# Patient Record
Sex: FEMALE | Race: WHITE | Employment: OTHER | URBAN - METROPOLITAN AREA
[De-identification: names, ages, dates, MRNs, and addresses within clinical notes are randomized per-mention and may not be internally consistent; named-entity substitution may affect disease eponyms.]

---

## 2018-05-11 ENCOUNTER — OFFICE VISIT (OUTPATIENT)
Dept: INTERNAL MEDICINE CLINIC | Age: 58
End: 2018-05-11

## 2018-05-11 VITALS
OXYGEN SATURATION: 95 % | BODY MASS INDEX: 36.32 KG/M2 | WEIGHT: 218 LBS | RESPIRATION RATE: 18 BRPM | DIASTOLIC BLOOD PRESSURE: 84 MMHG | HEART RATE: 81 BPM | HEIGHT: 65 IN | SYSTOLIC BLOOD PRESSURE: 128 MMHG

## 2018-05-11 DIAGNOSIS — M72.2 PLANTAR FASCIITIS, BILATERAL: ICD-10-CM

## 2018-05-11 DIAGNOSIS — M79.7 FIBROMYALGIA: Primary | ICD-10-CM

## 2018-05-11 DIAGNOSIS — G93.32 CFS (CHRONIC FATIGUE SYNDROME): ICD-10-CM

## 2018-05-11 DIAGNOSIS — G47.33 OSA ON CPAP: ICD-10-CM

## 2018-05-11 DIAGNOSIS — F33.2 SEVERE EPISODE OF RECURRENT MAJOR DEPRESSIVE DISORDER, WITHOUT PSYCHOTIC FEATURES (HCC): ICD-10-CM

## 2018-05-11 DIAGNOSIS — Z99.89 OSA ON CPAP: ICD-10-CM

## 2018-05-11 DIAGNOSIS — E03.9 ACQUIRED HYPOTHYROIDISM: ICD-10-CM

## 2018-05-11 DIAGNOSIS — E78.00 HYPERCHOLESTEROLEMIA: ICD-10-CM

## 2018-05-11 DIAGNOSIS — J45.20 MILD INTERMITTENT ASTHMA, UNSPECIFIED WHETHER COMPLICATED: ICD-10-CM

## 2018-05-11 DIAGNOSIS — L71.9 ROSACEA: ICD-10-CM

## 2018-05-11 PROBLEM — E66.01 SEVERE OBESITY (BMI 35.0-39.9) WITH COMORBIDITY (HCC): Status: ACTIVE | Noted: 2018-05-11

## 2018-05-11 PROBLEM — F32.A DEPRESSION: Status: ACTIVE | Noted: 2018-05-11

## 2018-05-11 RX ORDER — FLUTICASONE FUROATE AND VILANTEROL 100; 25 UG/1; UG/1
1 POWDER RESPIRATORY (INHALATION) DAILY
COMMUNITY
End: 2021-12-13 | Stop reason: SDUPTHER

## 2018-05-11 RX ORDER — ATORVASTATIN CALCIUM 10 MG/1
TABLET, FILM COATED ORAL DAILY
COMMUNITY
End: 2018-05-11 | Stop reason: SDUPTHER

## 2018-05-11 RX ORDER — MODAFINIL 200 MG/1
200 TABLET ORAL 2 TIMES DAILY
Qty: 60 TAB | Refills: 2 | Status: SHIPPED | OUTPATIENT
Start: 2018-05-11 | End: 2020-08-06 | Stop reason: SDUPTHER

## 2018-05-11 RX ORDER — LOSARTAN POTASSIUM 100 MG/1
100 TABLET ORAL DAILY
Qty: 90 TAB | Refills: 1 | Status: SHIPPED | OUTPATIENT
Start: 2018-05-11 | End: 2018-12-07 | Stop reason: SDUPTHER

## 2018-05-11 RX ORDER — LEVOTHYROXINE SODIUM 75 UG/1
TABLET ORAL
COMMUNITY
End: 2018-06-26 | Stop reason: SDUPTHER

## 2018-05-11 RX ORDER — ATORVASTATIN CALCIUM 10 MG/1
10 TABLET, FILM COATED ORAL DAILY
Qty: 90 TAB | Refills: 1 | Status: SHIPPED | OUTPATIENT
Start: 2018-05-11 | End: 2018-06-26 | Stop reason: SDUPTHER

## 2018-05-11 RX ORDER — MODAFINIL 200 MG/1
200 TABLET ORAL DAILY
COMMUNITY
End: 2018-05-11 | Stop reason: SDUPTHER

## 2018-05-11 RX ORDER — DULOXETIN HYDROCHLORIDE 60 MG/1
60 CAPSULE, DELAYED RELEASE ORAL 2 TIMES DAILY
COMMUNITY
End: 2021-04-07

## 2018-05-11 RX ORDER — TRAZODONE HYDROCHLORIDE 50 MG/1
200 TABLET ORAL
COMMUNITY
End: 2018-06-26 | Stop reason: SDUPTHER

## 2018-05-11 RX ORDER — PREGABALIN 100 MG/1
CAPSULE ORAL 2 TIMES DAILY
COMMUNITY
End: 2018-05-11 | Stop reason: SDUPTHER

## 2018-05-11 RX ORDER — PREGABALIN 100 MG/1
100 CAPSULE ORAL 2 TIMES DAILY
Qty: 60 CAP | Refills: 2 | Status: SHIPPED | OUTPATIENT
Start: 2018-05-11 | End: 2020-12-24

## 2018-05-11 RX ORDER — MONTELUKAST SODIUM 10 MG/1
10 TABLET ORAL DAILY
COMMUNITY
End: 2021-01-27 | Stop reason: SDUPTHER

## 2018-05-11 RX ORDER — LOSARTAN POTASSIUM 100 MG/1
100 TABLET ORAL DAILY
COMMUNITY
End: 2018-05-11 | Stop reason: SDUPTHER

## 2018-05-11 NOTE — PROGRESS NOTES
Health Maintenance Due   Topic Date Due    DTaP/Tdap/Td series (1 - Tdap) 11/01/1983    PAP AKA CERVICAL CYTOLOGY  11/01/1983    BREAST CANCER SCRN MAMMOGRAM  11/01/2012    FOBT Q 1 YEAR AGE 50-75  11/01/2012       Chief Complaint   Patient presents with    New Patient    Hypertension    Fibromyalgia       1. Have you been to the ER, urgent care clinic since your last visit? Hospitalized since your last visit? No    2. Have you seen or consulted any other health care providers outside of the [address] [place] [address] since your last visit? Include any pap smears or colon screening. No    3) Do you have an Advance Directive on file? no    4) Are you interested in receiving information on Advance Directives? NO      Patient is accompanied by self I have received verbal consent from Lorie Curtis to discuss any/all medical information while they are present in the room.

## 2018-05-11 NOTE — MR AVS SNAPSHOT
[address] [facility] 102 [address] [place] [address] 
[phone] Patient: Lorie Curtis MRN: [id number] HVN:[id number] Visit Information Date & Time Provider Department Dept. Phone Encounter #  
 5/11/2018  2:00 PM Clovis Espinoza, [address] [place] [address] Internal Medicine [phone] [id number] Follow-up Instructions Return in about 3 months (around 8/11/2018). Upcoming Health Maintenance Date Due DTaP/Tdap/Td series (1 - Tdap) 11/1/1983 PAP AKA CERVICAL CYTOLOGY 11/1/1983 BREAST CANCER SCRN MAMMOGRAM 11/1/2012 FOBT Q 1 YEAR AGE 50-75 11/1/2012 Influenza Age 5 to Adult 8/1/2018 Allergies as of 5/11/2018  Review Complete On: 5/11/2018 By: Clovis Espinoza DO Severity Noted Reaction Type Reactions Penicillins  05/11/2018    Nausea and Vomiting Current Immunizations  Never Reviewed No immunizations on file. Not reviewed this visit You Were Diagnosed With   
  
 Codes Comments Fibromyalgia    -  Primary ICD-10-CM: M79.7 ICD-9-CM: 729.1   
 CFS (chronic fatigue syndrome)     ICD-10-CM: R53.82 
ICD-9-CM: 780.71 Severe episode of recurrent major depressive disorder, without psychotic features ([facility] [address]     ICD-10-CM: F33.2 ICD-9-CM: 296.33 Acquired hypothyroidism     ICD-10-CM: E03.9 ICD-9-CM: 244.9 CLEO on CPAP     ICD-10-CM: G47.33, Z99.89 ICD-9-CM: 327.23, V46.8 Hypercholesterolemia     ICD-10-CM: E78.00 ICD-9-CM: 272.0 Mild intermittent asthma, unspecified whether complicated     IZO-65-ZI: J45.20 ICD-9-CM: 493.90 Rosacea     ICD-10-CM: L71.9 ICD-9-CM: 695.3 Plantar fasciitis, bilateral     ICD-10-CM: M72.2 ICD-9-CM: 728.71 Vitals BP Pulse Resp Height(growth percentile) Weight(growth percentile) SpO2  
 128/84 (BP 1 Location: Left arm, BP Patient Position: Sitting) 81 18 5' 5\" (1.651 m) 218 lb (98.9 kg) 95% BMI Smoking Status 36.28 kg/m2 Never Smoker Vitals History BMI and BSA Data Body Mass Index Body Surface Area  
 36.28 kg/m 2 2.13 m 2 Preferred Pharmacy Pharmacy Name Phone [id number] Marlon Khan [address] [place] [address] [phone] Your Updated Medication List  
  
   
This list is accurate as of 5/11/18  3:06 PM.  Always use your most recent med list.  
  
  
  
  
 atorvastatin 10 mg tablet Commonly known as:  LIPITOR Take 1 Tab by mouth daily. B COMPLEX 1 PO Take  by mouth. BREO ELLIPTA 100-25 mcg/dose inhaler Generic drug:  fluticasone-vilanterol Take 1 Puff by inhalation daily. CRANBERRY-PROBIOTICS-VITAMIN C 498-70-42 mg-mg-million Tab Generic drug:  AZO CRANBERRY Take  by mouth. DULoxetine 60 mg capsule Commonly known as:  CYMBALTA Take 60 mg by mouth two (2) times a day. levothyroxine 75 mcg tablet Commonly known as:  SYNTHROID Take  by mouth Daily (before breakfast). losartan 100 mg tablet Commonly known as:  COZAAR Take 1 Tab by mouth daily. modafinil 200 mg tablet Commonly known as:  PROVIGIL Take 1 Tab by mouth two (2) times a day. Max Daily Amount: 400 mg.  
  
 montelukast 10 mg tablet Commonly known as:  SINGULAIR Take 10 mg by mouth daily. pregabalin 100 mg capsule Commonly known as:  [place] Take 1 Cap by mouth two (2) times a day. Max Daily Amount: 200 mg.  
  
 traZODone 50 mg tablet Commonly known as:  Deborah [place] Take 200 mg by mouth nightly. Prescriptions Printed Refills  
 pregabalin (LYRICA) 100 mg capsule 2 Sig: Take 1 Cap by mouth two (2) times a day. Max Daily Amount: 200 mg. Class: Print Route: Oral  
 modafinil (PROVIGIL) 200 mg tablet 2 Sig: Take 1 Tab by mouth two (2) times a day. Max Daily Amount: 400 mg. Class: Print Route: Oral  
  
Prescriptions Sent to Pharmacy  Refills  
 atorvastatin (LIPITOR) 10 mg tablet 1  
 Sig: Take 1 Tab by mouth daily. Class: Normal  
 Pharmacy: [facility] [id number], [address] Ph #: [phone] Route: Oral  
 losartan (COZAAR) 100 mg tablet 1 Sig: Take 1 Tab by mouth daily. Class: Normal  
 Pharmacy: [facility] # [id number], [address] Ph #: [phone] Route: Oral  
  
We Performed the Following CBC W/O DIFF [06002 CPT(R)] LIPID PANEL [56703 CPT(R)] METABOLIC PANEL, COMPREHENSIVE [84824 CPT(R)] REFERRAL TO PSYCHIATRY [REF91 Custom] REFERRAL TO PULMONARY DISEASE [LLW59 Custom] TSH 3RD GENERATION [40526 CPT(R)] Follow-up Instructions Return in about 3 months (around 8/11/2018). Referral Information Referral ID Referred By Referred To  
  
 [id number] Sheryl Williamson MD   
   [address] [facility] [address] [facility], [address] Phone: [phone] Fax: [phone] Visits Status Start Date End Date 1 New Request 5/11/18 5/11/19 If your referral has a status of pending review or denied, additional information will be sent to support the outcome of this decision. Referral ID Referred By Referred To  
 [id number] [facility] [address] [place], [address] Visits Status Start Date End Date 1 New Request 5/11/18 5/11/19 If your referral has a status of pending review or denied, additional information will be sent to support the outcome of this decision. Introducing [facility]! Liam Aldrich introduces [employer] patient portal. Now you can access parts of your medical record, email your doctor's office, and request medication refills online. 1. In your internet browser, go to [email or web address] 2. Click on the First Time User? Click Here link in the Sign In box. You will see the New Member Sign Up page. 3. Enter your [employer] Access Code exactly as it appears below. You will not need to use this code after youve completed the sign-up process. If you do not sign up before the expiration date, you must request a new code. · [employer] Access Code: [email or web address] Expires: 8/9/2018  3:05 PM 
 
4. Enter the last four digits of your Social Security Number (xxxx) and Date of Birth (mm/dd/yyyy) as indicated and click Submit. You will be taken to the next sign-up page. 5. Create a [employer]Live ID. This will be your [employer] login ID and cannot be changed, so think of one that is secure and easy to remember. 6. Create a [employer] password. You can change your password at any time. 7. Enter your Password Reset Question and Answer. This can be used at a later time if you forget your password. 8. Enter your e-mail address. You will receive e-mail notification when new information is available in [address]. 9. Click Sign Up. You can now view and download portions of your medical record. 10. Click the Download Summary menu link to download a portable copy of your medical information. If you have questions, please visit the Frequently Asked Questions section of the [employer] website. Remember, [employer] is NOT to be used for urgent needs. For medical emergencies, dial 911. Now available from your iPhone and Android! Please provide this summary of care documentation to your next provider. If you have any questions after today's visit, please call [phone].

## 2018-05-30 NOTE — PROGRESS NOTES
HISTORY OF PRESENT ILLNESS  Veldon Bamberger is a 56 y.o. female. New patient who comes in to establish care. A few chronic medical issues including fibromyalgia and chronic fatigue syndrome. Reports being depressed. She is very obese and not losing weight. Trying to watch her diet and be active physically as tolerated. Reports bilateral plantar fasciitis. Has asthma and using inhalers. On CPAP for CLEO. Med list reviewed. Needs labs. Needs referral to specialists. Denies smoking. Drinks alcohol socially. No other acute issues today. New Patient   Pertinent negatives include no chest pain, no abdominal pain, no headaches and no shortness of breath. Hypertension    Associated symptoms include malaise/fatigue. Pertinent negatives include no chest pain, no blurred vision, no headaches, no dizziness and no shortness of breath. Depression   Pertinent negatives include no chest pain, no abdominal pain, no headaches and no shortness of breath. Fatigue   Pertinent negatives include no chest pain, no abdominal pain, no headaches and no shortness of breath. Review of Systems   Constitutional: Positive for fatigue and malaise/fatigue. Negative for fever and weight loss. HENT: Negative for congestion. Eyes: Negative for blurred vision. Respiratory: Negative for cough and shortness of breath. Cardiovascular: Negative for chest pain and leg swelling. Gastrointestinal: Negative for abdominal pain and heartburn. Genitourinary: Negative for dysuria and frequency. Musculoskeletal: Positive for joint pain and myalgias. Negative for back pain and falls. Skin: Positive for rash. Neurological: Negative for dizziness, sensory change, focal weakness and headaches. Psychiatric/Behavioral: Positive for depression. The patient is nervous/anxious and has insomnia. All other systems reviewed and are negative. Physical Exam   Constitutional: She is oriented to person, place, and time.  She appears well-developed and well-nourished. No distress. Obese lady   HENT:   Head: Normocephalic and atraumatic. Mouth/Throat: Oropharynx is clear and moist.   Eyes: Conjunctivae and EOM are normal. Pupils are equal, round, and reactive to light. No scleral icterus. Neck: Normal range of motion. Neck supple. No JVD present. No thyromegaly present. Cardiovascular: Normal rate, regular rhythm, normal heart sounds and intact distal pulses. No murmur heard. Pulmonary/Chest: Effort normal and breath sounds normal. No respiratory distress. She has no wheezes. She has no rales. Abdominal: Soft. Bowel sounds are normal. She exhibits no distension. There is no tenderness. Obese   Musculoskeletal: She exhibits tenderness (Generalized). She exhibits no edema. Neurological: She is alert and oriented to person, place, and time. No cranial nerve deficit. Coordination normal.   Skin: Skin is warm and dry. Rash (Facial rosacea) noted. Psychiatric: Her behavior is normal.   Seems depressed   Nursing note and vitals reviewed. ASSESSMENT and PLAN  Diagnoses and all orders for this visit:    1. Fibromyalgia  -     pregabalin (LYRICA) 100 mg capsule; Take 1 Cap by mouth two (2) times a day. Max Daily Amount: 200 mg.  -     modafinil (PROVIGIL) 200 mg tablet; Take 1 Tab by mouth two (2) times a day. Max Daily Amount: 400 mg.    2. CFS (chronic fatigue syndrome)    3. Severe episode of recurrent major depressive disorder, without psychotic features ([facility] [address]  -     REFERRAL TO PSYCHIATRY    4. Acquired hypothyroidism  -     TSH 3RD GENERATION    5. CLEO on CPAP    6. Hypercholesterolemia  -     LIPID PANEL  -     METABOLIC PANEL, COMPREHENSIVE  -     CBC W/O DIFF    7. Mild intermittent asthma, unspecified whether complicated  -     REFERRAL TO PULMONARY DISEASE    8. Rosacea    9. Plantar fasciitis, bilateral    Other orders  -     atorvastatin (LIPITOR) 10 mg tablet; Take 1 Tab by mouth daily.   -     losartan (COZAAR) 100 mg tablet; Take 1 Tab by mouth daily. Follow-up Disposition:  Return in about 3 months (around 8/11/2018).    lab results and schedule of future lab studies reviewed with patient  reviewed diet, exercise and weight control  reviewed medications and side effects in detail  F/u with other MD's as scheduled

## 2018-06-26 RX ORDER — TRAZODONE HYDROCHLORIDE 50 MG/1
200 TABLET ORAL
Qty: 30 TAB | Refills: 3 | Status: SHIPPED | OUTPATIENT
Start: 2018-06-26 | End: 2021-03-15

## 2018-06-26 RX ORDER — ATORVASTATIN CALCIUM 10 MG/1
10 TABLET, FILM COATED ORAL DAILY
Qty: 90 TAB | Refills: 1 | Status: SHIPPED | OUTPATIENT
Start: 2018-06-26 | End: 2020-10-16 | Stop reason: SDUPTHER

## 2018-06-26 RX ORDER — LEVOTHYROXINE SODIUM 75 UG/1
75 TABLET ORAL
Qty: 30 TAB | Refills: 5 | Status: SHIPPED | OUTPATIENT
Start: 2018-06-26 | End: 2020-12-07

## 2018-09-05 DIAGNOSIS — M79.7 FIBROMYALGIA: ICD-10-CM

## 2018-09-06 RX ORDER — PREGABALIN 100 MG/1
CAPSULE ORAL
Qty: 60 CAP | OUTPATIENT
Start: 2018-09-06

## 2019-03-28 LAB
CREATININE, EXTERNAL: 0.76
LDL-C, EXTERNAL: 128

## 2020-08-06 DIAGNOSIS — M79.7 FIBROMYALGIA: ICD-10-CM

## 2020-08-06 RX ORDER — ACETAMINOPHEN 500 MG
TABLET ORAL
COMMUNITY
End: 2021-12-22 | Stop reason: ALTCHOICE

## 2020-08-06 RX ORDER — BUPROPION HYDROCHLORIDE 300 MG/1
TABLET ORAL
COMMUNITY
End: 2020-09-04

## 2020-08-06 RX ORDER — WITCH HAZEL 50 %
PADS, MEDICATED (EA) TOPICAL
COMMUNITY
End: 2021-12-22 | Stop reason: ALTCHOICE

## 2020-08-06 NOTE — TELEPHONE ENCOUNTER
Patient called to refill Modafinil some how she is a week short, can you authorize a week early refill.

## 2020-08-15 RX ORDER — MODAFINIL 200 MG/1
200 TABLET ORAL 2 TIMES DAILY
Qty: 60 TAB | Refills: 2 | Status: SHIPPED | OUTPATIENT
Start: 2020-08-15 | End: 2020-11-13

## 2020-09-04 RX ORDER — DULOXETIN HYDROCHLORIDE 30 MG/1
CAPSULE, DELAYED RELEASE ORAL
Qty: 180 CAP | Refills: 1 | Status: SHIPPED | OUTPATIENT
Start: 2020-09-04 | End: 2021-01-24

## 2020-09-11 ENCOUNTER — TELEPHONE (OUTPATIENT)
Dept: FAMILY MEDICINE CLINIC | Age: 60
End: 2020-09-11

## 2020-09-11 RX ORDER — LEVOFLOXACIN 750 MG/1
750 TABLET ORAL DAILY
Qty: 10 TAB | Refills: 0 | Status: SHIPPED | OUTPATIENT
Start: 2020-09-11 | End: 2020-09-21

## 2020-09-27 RX ORDER — DICLOFENAC SODIUM 10 MG/G
GEL TOPICAL
Qty: 100 G | Refills: 3 | Status: SHIPPED | OUTPATIENT
Start: 2020-09-27 | End: 2021-08-06 | Stop reason: ALTCHOICE

## 2020-10-16 ENCOUNTER — TELEPHONE (OUTPATIENT)
Dept: FAMILY MEDICINE CLINIC | Age: 60
End: 2020-10-16

## 2020-10-16 DIAGNOSIS — E78.00 HYPERCHOLESTEROLEMIA: Primary | ICD-10-CM

## 2020-10-16 RX ORDER — ATORVASTATIN CALCIUM 10 MG/1
10 TABLET, FILM COATED ORAL DAILY
Qty: 90 TAB | Refills: 1 | Status: SHIPPED | OUTPATIENT
Start: 2020-10-16 | End: 2021-01-24

## 2020-10-16 RX ORDER — LEVOFLOXACIN 750 MG/1
750 TABLET ORAL DAILY
Qty: 10 TAB | Refills: 0 | Status: SHIPPED | OUTPATIENT
Start: 2020-10-16 | End: 2020-10-16 | Stop reason: SDUPTHER

## 2020-10-17 DIAGNOSIS — I10 ESSENTIAL (PRIMARY) HYPERTENSION: ICD-10-CM

## 2020-10-19 RX ORDER — LEVOFLOXACIN 750 MG/1
750 TABLET ORAL DAILY
Qty: 10 TAB | Refills: 0 | Status: SHIPPED | OUTPATIENT
Start: 2020-10-19 | End: 2020-10-29

## 2020-10-19 RX ORDER — LOSARTAN POTASSIUM 100 MG/1
TABLET ORAL
Qty: 90 TAB | Refills: 1 | Status: SHIPPED | OUTPATIENT
Start: 2020-10-19 | End: 2021-10-03

## 2020-10-21 VITALS
TEMPERATURE: 98.2 F | DIASTOLIC BLOOD PRESSURE: 90 MMHG | BODY MASS INDEX: 40.65 KG/M2 | HEART RATE: 80 BPM | WEIGHT: 244 LBS | SYSTOLIC BLOOD PRESSURE: 138 MMHG | OXYGEN SATURATION: 97 % | RESPIRATION RATE: 16 BRPM | HEIGHT: 65 IN

## 2020-10-26 ENCOUNTER — OFFICE VISIT (OUTPATIENT)
Dept: FAMILY MEDICINE CLINIC | Age: 60
End: 2020-10-26
Payer: MEDICARE

## 2020-10-26 VITALS
WEIGHT: 240 LBS | TEMPERATURE: 96.7 F | HEIGHT: 65 IN | RESPIRATION RATE: 97 BRPM | HEART RATE: 99 BPM | DIASTOLIC BLOOD PRESSURE: 90 MMHG | BODY MASS INDEX: 39.99 KG/M2 | SYSTOLIC BLOOD PRESSURE: 142 MMHG

## 2020-10-26 DIAGNOSIS — G47.00 INSOMNIA, UNSPECIFIED TYPE: ICD-10-CM

## 2020-10-26 DIAGNOSIS — I10 HYPERTENSION, UNSPECIFIED TYPE: Primary | ICD-10-CM

## 2020-10-26 DIAGNOSIS — M25.50 MULTIPLE JOINT PAIN: ICD-10-CM

## 2020-10-26 DIAGNOSIS — E03.9 ACQUIRED HYPOTHYROIDISM: ICD-10-CM

## 2020-10-26 DIAGNOSIS — Z11.59 ENCOUNTER FOR HEPATITIS C SCREENING TEST FOR LOW RISK PATIENT: ICD-10-CM

## 2020-10-26 DIAGNOSIS — E78.00 HYPERCHOLESTEROLEMIA: ICD-10-CM

## 2020-10-26 PROCEDURE — 3017F COLORECTAL CA SCREEN DOC REV: CPT | Performed by: NURSE PRACTITIONER

## 2020-10-26 PROCEDURE — G9717 DOC PT DX DEP/BP F/U NT REQ: HCPCS | Performed by: NURSE PRACTITIONER

## 2020-10-26 PROCEDURE — G8427 DOCREV CUR MEDS BY ELIG CLIN: HCPCS | Performed by: NURSE PRACTITIONER

## 2020-10-26 PROCEDURE — G0439 PPPS, SUBSEQ VISIT: HCPCS | Performed by: NURSE PRACTITIONER

## 2020-10-26 PROCEDURE — G8417 CALC BMI ABV UP PARAM F/U: HCPCS | Performed by: NURSE PRACTITIONER

## 2020-10-26 RX ORDER — ESZOPICLONE 2 MG/1
2 TABLET, FILM COATED ORAL
Qty: 90 TAB | Refills: 1 | Status: SHIPPED | OUTPATIENT
Start: 2020-10-26 | End: 2021-08-06 | Stop reason: ALTCHOICE

## 2020-10-26 RX ORDER — IBUPROFEN 800 MG/1
800 TABLET ORAL
Qty: 270 TAB | Refills: 1 | Status: SHIPPED | OUTPATIENT
Start: 2020-10-26 | End: 2021-03-19

## 2020-10-26 NOTE — PROGRESS NOTES
Medicare Wellness Exam:    Chief Complaint   Patient presents with    Annual Wellness Visit     she is a 64y.o. year old female who presents for evaluation for their Medicare Wellness Visit. Patient is going back on her allergy shots with her allergist.   She likes to get her flu vaccine in November so she will be doing that later this year and would like to delay getting either of the pneumonia vaccines at that time also. Her last pap was in 2017 and will make an appointment to have that screening done here. Her last mammogram was in 2016 and she will call to schedule that screening with the provider that did it last also. Her last colonoscopy was in 2017 and it was WNL and will be repeated in 10 years. She had an eye exam about 2 months ago at [facility] and that was WNL. She does not recall when her last tdap was and is declining that also at this time,. Her blood pressure was slightly elevated at 142/90 and patient denies symptoms and states that is is not usually that high and she has no current complaints at this time and is in need of refills of 2 of her meds    Fall Screen is completed and assessed=yes  Depression Screen is completed and assessed=yes  Medication list reviewed and adjusted for accuracy=yes  Immunizations reviewed and updated=yes  Health/Preventative Screenings reviewed and updated=yes  ADL Functions reviewed=yes  See scanned medicare wellness documents for full details.      Patient Active Problem List    Diagnosis    Anxiety    Dysphagia    Hashimoto's thyroiditis    Hypertensive disorder    Insomnia    Osteoarthritis    Plantar fasciitis    Seasonal allergic reaction    Acquired talipes planus, unspecified laterality    Depression    Fibromyalgia    CFS (chronic fatigue syndrome)    Severe episode of recurrent major depressive disorder, without psychotic features ([facility] [address]    Acquired hypothyroidism    CLEO on CPAP    Hypercholesterolemia    Mild intermittent asthma    Severe obesity (BMI 35.0-39. 9) with comorbidity ([facility] [address]    Rosacea    Plantar fasciitis, bilateral       Reviewed PmHx, RxHx, FmHx, SocHx, AllgHx and updated and dated in the chart. ROS   ROS per patient's active problem list and HPI    Objective:     Vitals:    10/26/20 1055   BP: (!) 142/90   Pulse: 99   Resp: (!) 97   Temp: (!) 96.7 °F (35.9 °C)   Weight: 240 lb (108.9 kg)   Height: 5' 5\" (1.651 m)     Physical Exam  Vitals signs reviewed. HENT:      Head: Normocephalic. Neck:      Musculoskeletal: Normal range of motion. Cardiovascular:      Rate and Rhythm: Normal rate and regular rhythm. Heart sounds: Normal heart sounds. Pulmonary:      Effort: Pulmonary effort is normal.      Breath sounds: Normal breath sounds. Abdominal:      General: Bowel sounds are normal.      Palpations: Abdomen is soft. Musculoskeletal: Normal range of motion. Skin:     General: Skin is warm and dry. Neurological:      Mental Status: She is alert and oriented to person, place, and time. Psychiatric:         Mood and Affect: Mood normal.         Behavior: Behavior normal.         Thought Content: Thought content normal.         Judgment: Judgment normal.          Assessment/ Plan:   Diagnoses and all orders for this visit:    1. Hypertension, unspecified type  -     CBC WITH AUTOMATED DIFF  -     METABOLIC PANEL, COMPREHENSIVE    2. Acquired hypothyroidism  -     TSH 3RD GENERATION  -     T4, FREE    3. Hypercholesterolemia  -     LIPID PANEL    4. Multiple joint pain  -     SED RATE (ESR)  -     RHEUMATOID FACTOR, QL    5. Insomnia, unspecified type  -     eszopiclone (LUNESTA) 2 mg tablet; Take 1 Tab by mouth nightly. Max Daily Amount: 2 mg. 6. Encounter for hepatitis C screening test for low risk patient  -     HEPATITIS C AB    Other orders  -     ibuprofen (MOTRIN) 800 mg tablet;  Take 1 Tab by mouth every eight (8) hours as needed for Pain.  -     varicella-zoster recombinant, PF, (Shingrix, PF,) 50 mcg/0.5 mL susr injection; 0.5 mL by IntraMUSCular route once for 1 dose.         -Pain evaluation performed in office  -Cognitive Screen performed in office  -Depression Screen, Fall risks (by up and go test)  and ADL functionality were addressed  -Medication list updated and reviewed for any changes   -A comprehensive review of medical issues and a plan was formulated  -End of life planning was addressed with pt   -Health Screenings for preventions were addressed and a plan was formulated  -Shingles Vaccine was recommended  -Discussed with patient cancer risk factors and appropriate screenings for age  -Patient evaluated for colonoscopy and referred if needed per screeing criteria  -Labs from previous visits were discussed with patient   -Discussed with patient diet and exercise and formulated a plan as needed  -An Advanced care plan was developed with the patient.  -Alcohol screening performed and was negative    -  Follow-up and Dispositions    · Return in about 6 months (around 4/26/2021) for for f/u of chronic conditions/controlled substance use. I have discussed the diagnosis with the patient and the intended plan as seen in the above orders. The patient understands and agrees with the plan. The patient has received an after-visit summary and questions were answered concerning future plans. Medication Side Effects and Warnings were discussed with patien  Patient Labs were reviewed and or requested  Patient Past Records were reviewed and or requested    There are no Patient Instructions on file for this visit.       Ronald Abebe NP

## 2020-10-27 LAB
ALBUMIN SERPL-MCNC: 4.8 G/DL (ref 3.8–4.9)
ALBUMIN/GLOB SERPL: 1.8 {RATIO} (ref 1.2–2.2)
ALP SERPL-CCNC: 106 IU/L (ref 39–117)
ALT SERPL-CCNC: 27 IU/L (ref 0–32)
AST SERPL-CCNC: 23 IU/L (ref 0–40)
BASOPHILS # BLD AUTO: 0.1 X10E3/UL (ref 0–0.2)
BASOPHILS NFR BLD AUTO: 1 %
BILIRUB SERPL-MCNC: 0.5 MG/DL (ref 0–1.2)
BUN SERPL-MCNC: 17 MG/DL (ref 6–24)
BUN/CREAT SERPL: 19 (ref 9–23)
CALCIUM SERPL-MCNC: 9.6 MG/DL (ref 8.7–10.2)
CHLORIDE SERPL-SCNC: 102 MMOL/L (ref 96–106)
CHOLEST SERPL-MCNC: 182 MG/DL (ref 100–199)
CO2 SERPL-SCNC: 24 MMOL/L (ref 20–29)
CREAT SERPL-MCNC: 0.88 MG/DL (ref 0.57–1)
EOSINOPHIL # BLD AUTO: 0.2 X10E3/UL (ref 0–0.4)
EOSINOPHIL NFR BLD AUTO: 2 %
ERYTHROCYTE [DISTWIDTH] IN BLOOD BY AUTOMATED COUNT: 13.8 % (ref 11.7–15.4)
ERYTHROCYTE [SEDIMENTATION RATE] IN BLOOD BY WESTERGREN METHOD: 4 MM/HR (ref 0–40)
GLOBULIN SER CALC-MCNC: 2.6 G/DL (ref 1.5–4.5)
GLUCOSE SERPL-MCNC: 86 MG/DL (ref 65–99)
HCT VFR BLD AUTO: 43.4 % (ref 34–46.6)
HCV AB S/CO SERPL IA: <0.1 S/CO RATIO (ref 0–0.9)
HDLC SERPL-MCNC: 57 MG/DL
HGB BLD-MCNC: 15.4 G/DL (ref 11.1–15.9)
IMM GRANULOCYTES # BLD AUTO: 0 X10E3/UL (ref 0–0.1)
IMM GRANULOCYTES NFR BLD AUTO: 0 %
LDLC SERPL CALC-MCNC: 105 MG/DL (ref 0–99)
LYMPHOCYTES # BLD AUTO: 2.4 X10E3/UL (ref 0.7–3.1)
LYMPHOCYTES NFR BLD AUTO: 26 %
MCH RBC QN AUTO: 30.5 PG (ref 26.6–33)
MCHC RBC AUTO-ENTMCNC: 35.5 G/DL (ref 31.5–35.7)
MCV RBC AUTO: 86 FL (ref 79–97)
MONOCYTES # BLD AUTO: 0.6 X10E3/UL (ref 0.1–0.9)
MONOCYTES NFR BLD AUTO: 6 %
NEUTROPHILS # BLD AUTO: 5.8 X10E3/UL (ref 1.4–7)
NEUTROPHILS NFR BLD AUTO: 65 %
PLATELET # BLD AUTO: 292 X10E3/UL (ref 150–450)
POTASSIUM SERPL-SCNC: 4.9 MMOL/L (ref 3.5–5.2)
PROT SERPL-MCNC: 7.4 G/DL (ref 6–8.5)
RBC # BLD AUTO: 5.05 X10E6/UL (ref 3.77–5.28)
RHEUMATOID FACT SERPL-ACNC: <10 IU/ML (ref 0–13.9)
SODIUM SERPL-SCNC: 142 MMOL/L (ref 134–144)
T4 FREE SERPL-MCNC: 1.34 NG/DL (ref 0.82–1.77)
TRIGL SERPL-MCNC: 110 MG/DL (ref 0–149)
TSH SERPL DL<=0.005 MIU/L-ACNC: 3.85 UIU/ML (ref 0.45–4.5)
VLDLC SERPL CALC-MCNC: 20 MG/DL (ref 5–40)
WBC # BLD AUTO: 9.1 X10E3/UL (ref 3.4–10.8)

## 2020-10-29 RX ORDER — ZOSTER VACCINE RECOMBINANT, ADJUVANTED 50 MCG/0.5
0.5 KIT INTRAMUSCULAR ONCE
Qty: 0.5 ML | Refills: 0 | Status: SHIPPED | OUTPATIENT
Start: 2020-10-29 | End: 2020-10-29

## 2020-11-05 ENCOUNTER — PATIENT MESSAGE (OUTPATIENT)
Dept: FAMILY MEDICINE CLINIC | Age: 60
End: 2020-11-05

## 2020-11-05 NOTE — TELEPHONE ENCOUNTER
From: Paris Wilkins  To: Chula Spicer NP  Sent: 11/5/2020 4:43 PM EST  Subject: Non-Urgent Medical Question    I'm finishing up my second round of antibiotics and I'm still coughing up green plegm?  Is that normal?

## 2020-11-06 NOTE — TELEPHONE ENCOUNTER
Patient has been on several rounds of antibiotics and is still coughing up green phlegm. I have referred patient to ENT. Ask when her appointment is with them.   More antibiotics may not be the answer and may do more harm than good

## 2020-11-12 DIAGNOSIS — M79.7 FIBROMYALGIA: ICD-10-CM

## 2020-11-13 RX ORDER — MODAFINIL 200 MG/1
TABLET ORAL
Qty: 60 TAB | Refills: 0 | Status: SHIPPED | OUTPATIENT
Start: 2020-11-13 | End: 2020-12-11

## 2020-11-13 NOTE — TELEPHONE ENCOUNTER
Last refilled 10/15/2020 and patient has been seen within the last month.   No inconsistencies noted in

## 2020-11-13 NOTE — PROGRESS NOTES
Your  labs are basically normal.  Some values may be minimally outside the \"normal\" range but are not harmful or clinically significant.   Please contact the office if you have questions or concerns

## 2020-11-16 ENCOUNTER — OFFICE VISIT (OUTPATIENT)
Dept: FAMILY MEDICINE CLINIC | Age: 60
End: 2020-11-16
Payer: MEDICARE

## 2020-11-16 VITALS
TEMPERATURE: 97.5 F | DIASTOLIC BLOOD PRESSURE: 82 MMHG | SYSTOLIC BLOOD PRESSURE: 140 MMHG | HEIGHT: 65 IN | BODY MASS INDEX: 40.65 KG/M2 | WEIGHT: 244 LBS | HEART RATE: 81 BPM | OXYGEN SATURATION: 92 %

## 2020-11-16 DIAGNOSIS — Z12.4 PAP SMEAR FOR CERVICAL CANCER SCREENING: Primary | ICD-10-CM

## 2020-11-16 PROCEDURE — 99213 OFFICE O/P EST LOW 20 MIN: CPT | Performed by: NURSE PRACTITIONER

## 2020-11-16 RX ORDER — CETIRIZINE HCL 10 MG
10 TABLET ORAL DAILY
COMMUNITY

## 2020-11-22 NOTE — PROGRESS NOTES
Subjective  Chief Complaint   Patient presents with    Gyn Exam     HPI:  Mimi Rea is a 64 y.o. female. 63 yo menopausal female presents for annual pap. She is sexually active and in a monogamous relationship. She is a [place] and she has not complaints at this time. It has been \"several years\" since she has had a pap smear but cannot recall having a positive pap    Past Medical History:   Diagnosis Date    Acquired talipes planus, unspecified laterality     Anemia     Anxiety     Bronchitis     Chronic pain     neck and back    Dysphagia     Hashimoto's thyroiditis     Headache     Hypercholesterolemia     Hypertensive disorder     Insomnia     Osteoarthritis     Plantar fasciitis     Seasonal allergic reaction      Family History   Problem Relation Age of Onset    COPD Mother     COPD Father     Heart Attack Father     Cancer Sister         skin    Diabetes Brother     Cancer Paternal Grandmother         breast     Social History     Socioeconomic History    Marital status:      Spouse name: Not on file    Number of children: Not on file    Years of education: Not on file    Highest education level: Not on file   Occupational History    Not on file   Social Needs    Financial resource strain: Not on file    Food insecurity     Worry: Not on file     Inability: Not on file    Transportation needs     Medical: Not on file     Non-medical: Not on file   Tobacco Use    Smoking status: Never Smoker    Smokeless tobacco: Never Used   Substance and Sexual Activity    Alcohol use:  Yes     Alcohol/week: 1.0 standard drinks     Types: 1 Glasses of wine per week    Drug use: No    Sexual activity: Never   Lifestyle    Physical activity     Days per week: Not on file     Minutes per session: Not on file    Stress: Not on file   Relationships    Social connections     Talks on phone: Not on file     Gets together: Not on file     Attends Faith service: Not on file Active member of club or organization: Not on file     Attends meetings of clubs or organizations: Not on file     Relationship status: Not on file    Intimate partner violence     Fear of current or ex partner: Not on file     Emotionally abused: Not on file     Physically abused: Not on file     Forced sexual activity: Not on file   Other Topics Concern    Not on file   Social History Narrative    Not on file     Current Outpatient Medications on File Prior to Visit   Medication Sig Dispense Refill    cetirizine (ZYRTEC) 10 mg tablet Take 10 mg by mouth daily.  modafiniL (PROVIGIL) 200 mg tablet take 1 tablet by mouth 2 times daily. max daily amout is 2 tablets  60 Tab 0    ibuprofen (MOTRIN) 800 mg tablet Take 1 Tab by mouth every eight (8) hours as needed for Pain. 270 Tab 1    eszopiclone (LUNESTA) 2 mg tablet Take 1 Tab by mouth nightly. Max Daily Amount: 2 mg. 90 Tab 1    losartan (COZAAR) 100 mg tablet TAKE 1 TABLET BY MOUTH EVERY DAY 90 Tab 1    atorvastatin (Lipitor) 10 mg tablet Take 1 Tab by mouth daily. 90 Tab 1    diclofenac (VOLTAREN) 1 % gel APPLY 2 GRAM TO THE AFFECTED AREA(S) BY TOPICAL ROUTE 4 TIMES PER  g 3    DULoxetine (CYMBALTA) 30 mg capsule TAKE 1 CAPSULE BY MOUTH TWICE A DAY AS DIRECTED 180 Cap 1    cholecalciferol (VITAMIN D3) (2,000 UNITS /50 MCG) cap capsule Take 1 capsule by mouth daily      cyanocobalamin 2,000 mcg TbER Take 1 tablet by mouth daily      fish oil-omega-3 fatty acids (Fish Oil) 340-1,000 mg capsule Take 2 tablets by mouth daily      levothyroxine (SYNTHROID) 75 mcg tablet Take 1 Tab by mouth Daily (before breakfast). 30 Tab 5    traZODone (DESYREL) 50 mg tablet Take 4 Tabs by mouth nightly. 30 Tab 3    vitamin B complex (B COMPLEX 1 PO) Take  by mouth.  AZO CRANBERRY (CRANBERRY-PROBIOTICS-VITAMIN C) 816-75-16 mg-mg-million tab Take  by mouth.  DULoxetine (CYMBALTA) 60 mg capsule Take 60 mg by mouth two (2) times a day.       fluticasone-vilanterol (BREO ELLIPTA) 100-25 mcg/dose inhaler Take 1 Puff by inhalation daily.  montelukast (SINGULAIR) 10 mg tablet Take 10 mg by mouth daily.  pregabalin (LYRICA) 100 mg capsule Take 1 Cap by mouth two (2) times a day. Max Daily Amount: 200 mg. 60 Cap 2     No current facility-administered medications on file prior to visit. Allergies   Allergen Reactions    Methylprednisolone Unknown (comments)    Penicillins Nausea and Vomiting     ROS   ROS Per HPI and PMH      Objective  Physical Exam  Genitourinary:     General: Normal vulva. Vagina: No vaginal discharge. Rectum: Normal.   Neurological:      Mental Status: She is oriented to person, place, and time. Psychiatric:         Mood and Affect: Mood normal.         Behavior: Behavior normal.         Thought Content: Thought content normal.         Judgment: Judgment normal.          Assessment & Plan      ICD-10-CM ICD-9-CM    1. Pap smear for cervical cancer screening  Z12.4 V76.2 PAP IG, APTIMA HPV AND RFX 16/18,45 ([id number])     Diagnoses and all orders for this visit:    1. Pap smear for cervical cancer screening  -     PAP IG, APTIMA HPV AND RFX 16/18,45 ([id number]);  Future        Delon Callaway NP

## 2020-11-24 LAB
ADEQUACY, 191172: NORMAL
CATEGORY, 191170: NORMAL
CYTOLOGIST CVX/VAG CYTO: NORMAL
CYTOLOGY CVX/VAG DOC THIN PREP: NORMAL
DX ICD CODE: NORMAL
HPV I/H RISK 4 DNA CVX QL PROBE+SIG AMP: NEGATIVE
INTERPRETATION, 191166: NORMAL
Lab: NORMAL

## 2020-12-06 DIAGNOSIS — G47.00 INSOMNIA, UNSPECIFIED: ICD-10-CM

## 2020-12-07 RX ORDER — TRAZODONE HYDROCHLORIDE 100 MG/1
TABLET ORAL
Qty: 360 TAB | Refills: 1 | Status: SHIPPED | OUTPATIENT
Start: 2020-12-07 | End: 2021-03-15

## 2020-12-07 RX ORDER — LEVOTHYROXINE SODIUM 75 UG/1
TABLET ORAL
Qty: 90 TAB | Refills: 1 | Status: SHIPPED | OUTPATIENT
Start: 2020-12-07 | End: 2021-03-19

## 2020-12-07 RX ORDER — DICLOFENAC SODIUM 75 MG/1
TABLET, DELAYED RELEASE ORAL
Qty: 180 TAB | Refills: 1 | Status: SHIPPED | OUTPATIENT
Start: 2020-12-07 | End: 2021-04-09

## 2020-12-07 RX ORDER — ESTRADIOL/NORETHINDRONE ACETATE TRANSDERMAL SYSTEM .05; .14 MG/D; MG/D
PATCH, EXTENDED RELEASE TRANSDERMAL
Qty: 24 PATCH | Refills: 5 | Status: SHIPPED | OUTPATIENT
Start: 2020-12-07 | End: 2021-08-06 | Stop reason: ALTCHOICE

## 2020-12-09 ENCOUNTER — PATIENT MESSAGE (OUTPATIENT)
Dept: FAMILY MEDICINE CLINIC | Age: 60
End: 2020-12-09

## 2020-12-09 DIAGNOSIS — G47.30 SLEEP APNEA, UNSPECIFIED TYPE: Primary | ICD-10-CM

## 2020-12-09 DIAGNOSIS — M25.50 ARTHRALGIA, UNSPECIFIED JOINT: ICD-10-CM

## 2020-12-09 NOTE — TELEPHONE ENCOUNTER
From: Carly Thrasher  To: Sim Delgado NP  Sent: 12/9/2020 1:22 PM EST  Subject: Non-Urgent Medical Question    And could I please get a referral for the pulmonologist for my cpap stuff and also one for my joint pain? I don't think the blood work had inflammatories so I'm not sure what kind of doctor to see. ...     Thanks in advance,  Leidy Abbott

## 2020-12-09 NOTE — TELEPHONE ENCOUNTER
From: Salud Hurst  To: Mark Rodriguez NP  Sent: 12/9/2020 1:06 PM EST  Subject: Non-Urgent Medical Question    It seems like i have a bad case of bronchitis and my darling  is threatening to take me to urgent care. Would seeing a doctor even be helpful? It seems like you just treat it at home like the cold or flu. Thanks in advance,  Geri    PS: Hacking dry cough. Thick white and yellow (with a slight greenish tinge) phlegm. Sleeping more. Harder to get out of bed. Etc.

## 2020-12-09 NOTE — TELEPHONE ENCOUNTER
From: Arabella Reis  To: Chivo Murphy NP  Sent: 12/9/2020 1:24 PM EST  Subject: Non-Urgent Medical Question    Sorry. I'm super tired and keep forgetting something. Since I'm having breast pain I'm supposed to schedule a diagnostic mammogram instead of a regular one. ..

## 2020-12-09 NOTE — TELEPHONE ENCOUNTER
Her lab work did include inflammatory labs (rheumatoid factor, sed rate) and they were within normal limits but I will refer her to pulmonology for her cpap supplies and I will refer her to an arthritis specialist to see if they can help with her joint pain

## 2020-12-10 RX ORDER — AMOXICILLIN AND CLAVULANATE POTASSIUM 875; 125 MG/1; MG/1
1 TABLET, FILM COATED ORAL EVERY 12 HOURS
Qty: 20 TAB | Refills: 0 | Status: SHIPPED | OUTPATIENT
Start: 2020-12-10 | End: 2020-12-20

## 2020-12-10 NOTE — TELEPHONE ENCOUNTER
I am thinking that your issues may be viral and an antibiotic may not be helpful but I will call in an antibiotic  and if that does not do it you can assume it is viral

## 2020-12-11 ENCOUNTER — TELEPHONE (OUTPATIENT)
Dept: PRIMARY CARE CLINIC | Age: 60
End: 2020-12-11

## 2020-12-11 ENCOUNTER — TELEPHONE (OUTPATIENT)
Dept: FAMILY MEDICINE CLINIC | Age: 60
End: 2020-12-11

## 2020-12-11 ENCOUNTER — PATIENT MESSAGE (OUTPATIENT)
Dept: FAMILY MEDICINE CLINIC | Age: 60
End: 2020-12-11

## 2020-12-11 DIAGNOSIS — R11.0 NAUSEA: Primary | ICD-10-CM

## 2020-12-11 DIAGNOSIS — M79.7 FIBROMYALGIA: ICD-10-CM

## 2020-12-11 RX ORDER — ONDANSETRON 8 MG/1
8 TABLET, ORALLY DISINTEGRATING ORAL
Qty: 21 TAB | Refills: 0 | Status: SHIPPED | OUTPATIENT
Start: 2020-12-11 | End: 2021-08-06 | Stop reason: ALTCHOICE

## 2020-12-11 RX ORDER — DOXYCYCLINE 100 MG/1
100 TABLET ORAL 2 TIMES DAILY
Qty: 20 TAB | Refills: 0 | Status: SHIPPED | OUTPATIENT
Start: 2020-12-11 | End: 2020-12-21

## 2020-12-11 RX ORDER — MODAFINIL 200 MG/1
TABLET ORAL
Qty: 60 TAB | Refills: 0 | Status: SHIPPED | OUTPATIENT
Start: 2020-12-11 | End: 2021-01-13

## 2020-12-11 NOTE — TELEPHONE ENCOUNTER
Patient was prescibed augmentin which she has tolerated in the past and is requesting a different antibiotic.

## 2020-12-11 NOTE — TELEPHONE ENCOUNTER
Cassia Clements called to say that his wife was in yesterday to see Flori Chan, she prescribed an antibiotic for her but patient has been worse because she is allergic to penicillin.     Please prescribe something else today she is not well.    -[facility]

## 2020-12-11 NOTE — TELEPHONE ENCOUNTER
I also sent her a [employer] message to let her know that a new antibiotic was called in and that [facility] would order a chest xray if she would like one

## 2020-12-11 NOTE — TELEPHONE ENCOUNTER
Per [facility]SS: I called a new antibiotic in for,  also would you call and see if she would be willing to get a chest Xray.   Her  states that bettye has an URI and is not doing well

## 2020-12-11 NOTE — TELEPHONE ENCOUNTER
Received notice from [facility] on call. Advised pt of new abx rx was already sent in. I added zofran from the nausea and suggested she come see for an additional covid test due to worsening cough.

## 2020-12-23 DIAGNOSIS — M79.7 FIBROMYALGIA: ICD-10-CM

## 2020-12-24 RX ORDER — PREGABALIN 100 MG/1
CAPSULE ORAL
Qty: 180 CAP | Refills: 1 | Status: SHIPPED | OUTPATIENT
Start: 2020-12-24 | End: 2021-06-18

## 2020-12-24 NOTE — TELEPHONE ENCOUNTER
Refilling lyrica. Last refilled [id number] and not inconsistencies noted in .   Patient has been seen in the last 6 months

## 2021-01-11 ENCOUNTER — OFFICE VISIT (OUTPATIENT)
Dept: ENT CLINIC | Age: 61
End: 2021-01-11
Payer: MEDICARE

## 2021-01-11 VITALS
DIASTOLIC BLOOD PRESSURE: 90 MMHG | SYSTOLIC BLOOD PRESSURE: 140 MMHG | HEIGHT: 65 IN | BODY MASS INDEX: 41.65 KG/M2 | TEMPERATURE: 96.7 F | WEIGHT: 250 LBS | HEART RATE: 85 BPM | OXYGEN SATURATION: 96 % | RESPIRATION RATE: 18 BRPM

## 2021-01-11 DIAGNOSIS — Z99.89 OSA ON CPAP: Primary | ICD-10-CM

## 2021-01-11 DIAGNOSIS — J30.9 ALLERGIC RHINITIS, UNSPECIFIED SEASONALITY, UNSPECIFIED TRIGGER: ICD-10-CM

## 2021-01-11 DIAGNOSIS — G47.33 OSA ON CPAP: Primary | ICD-10-CM

## 2021-01-11 DIAGNOSIS — H69.83 ETD (EUSTACHIAN TUBE DYSFUNCTION), BILATERAL: ICD-10-CM

## 2021-01-11 DIAGNOSIS — J34.2 DNS (DEVIATED NASAL SEPTUM): ICD-10-CM

## 2021-01-11 DIAGNOSIS — J32.0 CHRONIC MAXILLARY SINUSITIS: ICD-10-CM

## 2021-01-11 DIAGNOSIS — M79.7 FIBROMYALGIA: ICD-10-CM

## 2021-01-11 DIAGNOSIS — J34.3 HYPERTROPHY OF BOTH INFERIOR NASAL TURBINATES: ICD-10-CM

## 2021-01-11 PROCEDURE — G8427 DOCREV CUR MEDS BY ELIG CLIN: HCPCS | Performed by: OTOLARYNGOLOGY

## 2021-01-11 PROCEDURE — G8753 SYS BP > OR = 140: HCPCS | Performed by: OTOLARYNGOLOGY

## 2021-01-11 PROCEDURE — 99204 OFFICE O/P NEW MOD 45 MIN: CPT | Performed by: OTOLARYNGOLOGY

## 2021-01-11 PROCEDURE — G8755 DIAS BP > OR = 90: HCPCS | Performed by: OTOLARYNGOLOGY

## 2021-01-11 PROCEDURE — G9717 DOC PT DX DEP/BP F/U NT REQ: HCPCS | Performed by: OTOLARYNGOLOGY

## 2021-01-11 PROCEDURE — 3017F COLORECTAL CA SCREEN DOC REV: CPT | Performed by: OTOLARYNGOLOGY

## 2021-01-11 PROCEDURE — G8417 CALC BMI ABV UP PARAM F/U: HCPCS | Performed by: OTOLARYNGOLOGY

## 2021-01-11 NOTE — PROGRESS NOTES
Subjective:    Cheikh Rogers   64 y.o.   1960     HPI     Location - nose, sinus    Quality - chronic sinus, allergies    Severity -  Moderate, severe    Duration - years    Timing - chronic    Context - pt with multiple allergy symptoms and complaints, has been on IT with Dr Kamron Cano but had to change providers due to insurance changes; she has been on multiple PO and nasal sprays with minimal improvement; she feels that she has a sinus infection for past 2 years, multiple abx in past 1 year; no recent imaging    Modifying Features - worse with allergies    Associated symptoms/signs - cough, ear pain      Review of Systems  Review of Systems   Constitutional: Negative for chills and fever. HENT: Positive for congestion, ear pain and sinus pain. Negative for hearing loss, nosebleeds and tinnitus. Eyes: Negative for blurred vision and double vision. Respiratory: Positive for cough. Negative for sputum production and shortness of breath. Cardiovascular: Negative for chest pain and palpitations. Gastrointestinal: Negative for heartburn, nausea and vomiting. Musculoskeletal: Negative for joint pain and neck pain. Skin: Negative. Neurological: Positive for headaches. Negative for dizziness, speech change and weakness. Endo/Heme/Allergies: Positive for environmental allergies. Does not bruise/bleed easily. Psychiatric/Behavioral: Negative for memory loss. The patient does not have insomnia.           Past Medical History:   Diagnosis Date    Acquired talipes planus, unspecified laterality     Anemia     Anxiety     Bronchitis     Chronic pain     neck and back    Dysphagia     Hashimoto's thyroiditis     Headache     Hypercholesterolemia     Hypertensive disorder     Insomnia     Osteoarthritis     Plantar fasciitis     Seasonal allergic reaction      Past Surgical History:   Procedure Laterality Date    HX GYN  2011    endometrioma removal      Family History   Problem Relation Age of Onset   Palmer Coburn COPD Mother    Palmer Coburn COPD Father     Heart Attack Father     Cancer Sister         skin    Diabetes Brother     Cancer Paternal Grandmother         breast     Social History     Tobacco Use    Smoking status: Never Smoker    Smokeless tobacco: Never Used   Substance Use Topics    Alcohol use: Yes     Alcohol/week: 1.0 standard drinks     Types: 1 Glasses of wine per week      Prior to Admission medications    Medication Sig Start Date End Date Taking? Authorizing Provider   pregabalin (LYRICA) 100 mg capsule TAKE 1 CAPSULE BY MOUTH TWICE A DAY 12/24/20   Kay Sandoval NP   modafiniL (PROVIGIL) 200 mg tablet TAKE 1 TABLET BY MOUTH TWICE DAILY. MAX AMOUNT IS 2 TABLETS 12/11/20   Kay Sandoval NP   ondansetron [facility] ODT) 8 mg disintegrating tablet Take 1 Tab by mouth every eight (8) hours as needed for Nausea or Vomiting. 12/11/20   Aguilar Dee NP   diclofenac EC (VOLTAREN) 75 mg EC tablet TAKE 1 TABLET BY MOUTH TWICE A DAY 12/7/20   Kay Sandoval NP   CombiPatch 0.05-0.14 mg/24 hr APPLY 1 PATCH TWICE WEEKLY 12/7/20   Kay Sandoval NP   traZODone (DESYREL) 100 mg tablet TAKE 4 TABLETS BY MOUTH AT BEDTIME 12/7/20   Kay Sandoval NP   levothyroxine (SYNTHROID) 75 mcg tablet TAKE 1 TABLET BY MOUTH EVERY DAY 12/7/20   Kay Sandoval NP   cetirizine (ZYRTEC) 10 mg tablet Take 10 mg by mouth daily. Provider, Henry   ibuprofen (MOTRIN) 800 mg tablet Take 1 Tab by mouth every eight (8) hours as needed for Pain. 10/26/20   Kay Sandoval NP   eszopiclone Atiya Raymundo) 2 mg tablet Take 1 Tab by mouth nightly. Max Daily Amount: 2 mg. 10/26/20   Kay Sandoval NP   losartan (COZAAR) 100 mg tablet TAKE 1 TABLET BY MOUTH EVERY DAY 10/19/20   Kay Sandoval NP   atorvastatin (Lipitor) 10 mg tablet Take 1 Tab by mouth daily.  10/16/20   Kay Sandoval NP   diclofenac (VOLTAREN) 1 % gel APPLY 2 GRAM TO THE AFFECTED AREA(S) BY TOPICAL ROUTE 4 TIMES PER DAY 9/27/20   Kay Sandoval NP   DULoxetine (CYMBALTA) 30 mg capsule TAKE 1 CAPSULE BY MOUTH TWICE A DAY AS DIRECTED 9/4/20   Clem Goldman NP   cholecalciferol (VITAMIN D3) (2,000 UNITS /50 MCG) cap capsule Take 1 capsule by mouth daily    Provider, Historical   cyanocobalamin 2,000 mcg TbER Take 1 tablet by mouth daily    Provider, Historical   fish oil-omega-3 fatty acids (Fish Oil) 340-1,000 mg capsule Take 2 tablets by mouth daily    Provider, Historical   traZODone (DESYREL) 50 mg tablet Take 4 Tabs by mouth nightly. 6/26/18   Jaja Goins NP   vitamin B complex (B COMPLEX 1 PO) Take  by mouth. Provider, Historical   AZO CRANBERRY (CRANBERRY-PROBIOTICS-VITAMIN C) 823-68-44 mg-mg-million tab Take  by mouth. Provider, Historical   DULoxetine (CYMBALTA) 60 mg capsule Take 60 mg by mouth two (2) times a day. Provider, Historical   fluticasone-vilanterol (BREO ELLIPTA) 100-25 mcg/dose inhaler Take 1 Puff by inhalation daily. Provider, Historical   montelukast (SINGULAIR) 10 mg tablet Take 10 mg by mouth daily. Provider, Historical        Allergies   Allergen Reactions    Methylprednisolone Unknown (comments)    Silicone Rash    Penicillins Nausea and Vomiting         Objective:     Visit Vitals  BP (!) 140/90 (BP 1 Location: Left arm, BP Patient Position: Sitting)   Pulse 85   Temp (!) 96.7 °F (35.9 °C) (Oral)   Resp 18   Ht 5' 5\" (1.651 m)   Wt 250 lb (113.4 kg)   SpO2 96%   BMI 41.60 kg/m²        Physical Exam  Vitals signs reviewed. Constitutional:       General: She is awake. She is not in acute distress. Appearance: Normal appearance. She is well-groomed. She is obese. HENT:      Head: Normocephalic and atraumatic. Jaw: There is normal jaw occlusion. No trismus, tenderness or malocclusion. Salivary Glands: Right salivary gland is diffusely enlarged. Right salivary gland is not tender. Left salivary gland is diffusely enlarged. Left salivary gland is not tender.       Right Ear: Hearing, tympanic membrane, ear canal and external ear normal.      Left Ear: Hearing, tympanic membrane, ear canal and external ear normal.      Ears:      Roa exam findings: does not lateralize. Right Rinne: AC > BC. Left Rinne: AC > BC. Nose: Septal deviation and mucosal edema present. No nasal deformity. Right Nostril: No epistaxis. Left Nostril: No epistaxis. Right Turbinates: Enlarged and swollen. Not pale. Left Turbinates: Enlarged. Not swollen or pale. Right Sinus: No maxillary sinus tenderness or frontal sinus tenderness. Left Sinus: No maxillary sinus tenderness or frontal sinus tenderness. Mouth/Throat:      Lips: Pink. No lesions. Mouth: Mucous membranes are moist. No oral lesions. Dentition: Normal dentition. No dental caries. Tongue: No lesions. Palate: No mass and lesions. Pharynx: Oropharynx is clear. Uvula midline. No oropharyngeal exudate or posterior oropharyngeal erythema. Tonsils: No tonsillar exudate. 0 on the right. 0 on the left. Eyes:      General: Vision grossly intact. Extraocular Movements: Extraocular movements intact. Right eye: No nystagmus. Left eye: No nystagmus. Conjunctiva/sclera: Conjunctivae normal.      Pupils: Pupils are equal, round, and reactive to light. Neck:      Musculoskeletal: No edema or erythema. Thyroid: No thyroid mass, thyromegaly or thyroid tenderness. Trachea: Trachea and phonation normal. No tracheal tenderness or tracheal deviation. Cardiovascular:      Rate and Rhythm: Normal rate and regular rhythm. Pulmonary:      Effort: Pulmonary effort is normal. No respiratory distress. Breath sounds: No stridor. Musculoskeletal: Normal range of motion. General: No swelling or tenderness. Lymphadenopathy:      Cervical: No cervical adenopathy. Skin:     General: Skin is warm and dry. Findings: No lesion or rash. Neurological:      General: No focal deficit present. Mental Status: She is alert and oriented to person, place, and time. Mental status is at baseline. Cranial Nerves: Cranial nerves are intact. Coordination: Romberg sign negative. Gait: Gait is intact. Comments: Negative Hallpike   Psychiatric:         Mood and Affect: Mood normal.         Behavior: Behavior normal. Behavior is cooperative. Assessment/Plan:     Encounter Diagnoses   Name Primary?     CLEO on CPAP Yes    Fibromyalgia     Allergic rhinitis, unspecified seasonality, unspecified trigger     DNS (deviated nasal septum)     ETD (Eustachian tube dysfunction), bilateral     Hypertrophy of both inferior nasal turbinates     Chronic maxillary sinusitis      I have reviewed her prior records from allergists, testing  She may have had a prior Ct done with [facility], we will obtain  I recommend allergy testing and likely should resume IT  Need to review her imaging studies, she has a DNS    Orders Placed This Encounter    02154 - ALLERGY INTRADERMAL SKIN TESTS

## 2021-01-12 DIAGNOSIS — M79.7 FIBROMYALGIA: ICD-10-CM

## 2021-01-13 RX ORDER — MODAFINIL 200 MG/1
TABLET ORAL
Qty: 60 TAB | Refills: 1 | Status: SHIPPED | OUTPATIENT
Start: 2021-01-13 | End: 2021-03-11

## 2021-01-13 NOTE — TELEPHONE ENCOUNTER
Modanifil refilled 12/11/2020 and no. inconsistencies noted in .   Patient has been seen in the last 6 months

## 2021-01-23 DIAGNOSIS — E78.00 HYPERCHOLESTEROLEMIA: ICD-10-CM

## 2021-01-24 RX ORDER — DULOXETIN HYDROCHLORIDE 30 MG/1
CAPSULE, DELAYED RELEASE ORAL
Qty: 180 CAP | Refills: 1 | Status: SHIPPED | OUTPATIENT
Start: 2021-01-24 | End: 2021-04-07

## 2021-01-24 RX ORDER — ATORVASTATIN CALCIUM 10 MG/1
TABLET, FILM COATED ORAL
Qty: 90 TAB | Refills: 1 | Status: SHIPPED | OUTPATIENT
Start: 2021-01-24 | End: 2021-10-03

## 2021-01-25 ENCOUNTER — PATIENT MESSAGE (OUTPATIENT)
Dept: FAMILY MEDICINE CLINIC | Age: 61
End: 2021-01-25

## 2021-01-25 ENCOUNTER — TELEPHONE (OUTPATIENT)
Dept: FAMILY MEDICINE CLINIC | Age: 61
End: 2021-01-25

## 2021-01-25 DIAGNOSIS — Z12.31 SCREENING MAMMOGRAM, ENCOUNTER FOR: Primary | ICD-10-CM

## 2021-01-25 NOTE — TELEPHONE ENCOUNTER
Georges Angulo from [facility] in [address]. Patient has a prescription for CombiPatch. It is over $200 but the pill form is much cheaper. Can we offer the pill option?

## 2021-01-26 ENCOUNTER — PATIENT MESSAGE (OUTPATIENT)
Dept: SLEEP MEDICINE | Age: 61
End: 2021-01-26

## 2021-01-27 RX ORDER — MONTELUKAST SODIUM 10 MG/1
10 TABLET ORAL DAILY
Qty: 90 TAB | Refills: 2 | Status: SHIPPED | OUTPATIENT
Start: 2021-01-27 | End: 2021-12-13

## 2021-01-28 NOTE — TELEPHONE ENCOUNTER
I am ok with the pill option.   Find out from pharmacist (at [facility])  what is the mgs of the pill and dosing instructions to substitute for the combipatch

## 2021-01-29 DIAGNOSIS — B37.31 CANDIDIASIS OF VULVA AND VAGINA: ICD-10-CM

## 2021-01-29 NOTE — TELEPHONE ENCOUNTER
Spoke to pharmacist, Patient script needs to request the combo tablet estradiol 0.05mg/norephidrone 0.14mg tablet. Sig: Take one tablet daily. 90 day supply to [facility] in place of the Combipatch.

## 2021-02-01 ENCOUNTER — TELEPHONE (OUTPATIENT)
Dept: FAMILY MEDICINE CLINIC | Age: 61
End: 2021-02-01

## 2021-02-01 RX ORDER — NYSTATIN 100000 [USP'U]/ML
SUSPENSION ORAL
Qty: 180 ML | Refills: 3 | Status: SHIPPED | OUTPATIENT
Start: 2021-02-01 | End: 2022-02-28

## 2021-02-01 NOTE — TELEPHONE ENCOUNTER
Called and spoke with Janay Richardson the pharmacist and ordering estradiol 0.5- norethindrone 1 mgs, dispense 90 with one refill

## 2021-03-08 DIAGNOSIS — M79.7 FIBROMYALGIA: ICD-10-CM

## 2021-03-11 RX ORDER — MODAFINIL 200 MG/1
TABLET ORAL
Qty: 60 TAB | Refills: 0 | Status: SHIPPED | OUTPATIENT
Start: 2021-03-11 | End: 2021-04-05

## 2021-03-11 NOTE — TELEPHONE ENCOUNTER
Patient seen within the last 6 months and medication last refilled [id number].   No inconsistencies noted in

## 2021-03-15 RX ORDER — DOXYCYCLINE 100 MG/1
100 CAPSULE ORAL 2 TIMES DAILY
Qty: 20 CAP | Refills: 1 | Status: SHIPPED | OUTPATIENT
Start: 2021-03-15 | End: 2021-03-25

## 2021-03-19 RX ORDER — LEVOTHYROXINE SODIUM 75 UG/1
TABLET ORAL
Qty: 90 TAB | Refills: 1 | Status: SHIPPED | OUTPATIENT
Start: 2021-03-19 | End: 2022-02-24

## 2021-03-19 RX ORDER — IBUPROFEN 800 MG/1
TABLET ORAL
Qty: 270 TAB | Refills: 1 | Status: SHIPPED | OUTPATIENT
Start: 2021-03-19 | End: 2021-04-09

## 2021-03-29 ENCOUNTER — TELEPHONE (OUTPATIENT)
Dept: ENT CLINIC | Age: 61
End: 2021-03-29

## 2021-04-05 DIAGNOSIS — M79.7 FIBROMYALGIA: ICD-10-CM

## 2021-04-05 RX ORDER — MODAFINIL 200 MG/1
TABLET ORAL
Qty: 60 TAB | Refills: 1 | Status: SHIPPED | OUTPATIENT
Start: 2021-04-05 | End: 2021-06-21

## 2021-04-05 NOTE — TELEPHONE ENCOUNTER
Patient has been seen in the last 6 months and medication last refilled [id number].   No inconsistencies noted in

## 2021-04-07 ENCOUNTER — PATIENT MESSAGE (OUTPATIENT)
Dept: FAMILY MEDICINE CLINIC | Age: 61
End: 2021-04-07

## 2021-04-07 RX ORDER — DULOXETIN HYDROCHLORIDE 60 MG/1
60 CAPSULE, DELAYED RELEASE ORAL 2 TIMES DAILY
Qty: 180 CAP | Refills: 1 | Status: SHIPPED | OUTPATIENT
Start: 2021-04-07 | End: 2021-07-02 | Stop reason: SDUPTHER

## 2021-04-07 NOTE — TELEPHONE ENCOUNTER
From: Gillian Hoff  To: Vida Paz NP  Sent: 4/7/2021 11:53 AM EDT  Subject: Prescription Question    Hope you had a sarah holiday. I'm in an almost unbearable amount of pain. My worsening arthritis (or whatever it is) has pushed it beyond a manageable level for me and I'm not sure what we can do. We had cut the Cymbalta dosage in half quite some time ago and I'm not sure if it could be related? I'm using Medical Marijuana and I think it's the only thing that's kept me out of the emergency room, to be honest. Even with that I've come pretty close a couple of times. Not sure what our best options are but maybe adjusting a dosage of one of my drugs or a new drug would be the best way to go? Please advise at your earliest convenience.     Thank you in advance,  Ganesh Cox

## 2021-04-12 DIAGNOSIS — R92.8 ABNORMAL MAMMOGRAM: Primary | ICD-10-CM

## 2021-04-25 DIAGNOSIS — R05.9 COUGH: Primary | ICD-10-CM

## 2021-04-25 RX ORDER — PROMETHAZINE HYDROCHLORIDE AND CODEINE PHOSPHATE 6.25; 1 MG/5ML; MG/5ML
5 SOLUTION ORAL
Qty: 118 ML | Refills: 0 | Status: SHIPPED | OUTPATIENT
Start: 2021-04-25 | End: 2021-04-30

## 2021-04-27 ENCOUNTER — TRANSCRIBE ORDER (OUTPATIENT)
Dept: MAMMOGRAPHY | Age: 61
End: 2021-04-27

## 2021-04-27 DIAGNOSIS — N64.4 ACUTE BREAST PAIN: Primary | ICD-10-CM

## 2021-04-29 DIAGNOSIS — R05.9 COUGH: ICD-10-CM

## 2021-04-30 RX ORDER — PROMETHAZINE HYDROCHLORIDE AND CODEINE PHOSPHATE 6.25; 1 MG/5ML; MG/5ML
SOLUTION ORAL
Qty: 120 ML | Refills: 0 | Status: SHIPPED | OUTPATIENT
Start: 2021-04-30 | End: 2021-05-21

## 2021-05-04 ENCOUNTER — TRANSCRIBE ORDER (OUTPATIENT)
Dept: SCHEDULING | Age: 61
End: 2021-05-04

## 2021-05-04 DIAGNOSIS — R92.8 ABNORMAL MAMMOGRAM: Primary | ICD-10-CM

## 2021-05-04 DIAGNOSIS — Z12.31 SCREENING MAMMOGRAM, ENCOUNTER FOR: Primary | ICD-10-CM

## 2021-05-10 ENCOUNTER — PATIENT MESSAGE (OUTPATIENT)
Dept: FAMILY MEDICINE CLINIC | Age: 61
End: 2021-05-10

## 2021-05-10 DIAGNOSIS — G93.32 CFS (CHRONIC FATIGUE SYNDROME): ICD-10-CM

## 2021-05-10 DIAGNOSIS — F33.2 SEVERE EPISODE OF RECURRENT MAJOR DEPRESSIVE DISORDER, WITHOUT PSYCHOTIC FEATURES (HCC): Primary | ICD-10-CM

## 2021-05-13 ENCOUNTER — HOSPITAL ENCOUNTER (OUTPATIENT)
Dept: MAMMOGRAPHY | Age: 61
Discharge: HOME | End: 2021-05-13
Attending: NURSE PRACTITIONER
Payer: COMMERCIAL

## 2021-05-13 DIAGNOSIS — Z12.31 SCREENING MAMMOGRAM, ENCOUNTER FOR: ICD-10-CM

## 2021-05-13 PROCEDURE — 77067 SCR MAMMO BI INCL CAD: CPT

## 2021-05-20 DIAGNOSIS — R05.9 COUGH: ICD-10-CM

## 2021-05-21 RX ORDER — PROMETHAZINE HYDROCHLORIDE AND CODEINE PHOSPHATE 6.25; 1 MG/5ML; MG/5ML
SOLUTION ORAL
Qty: 120 ML | Refills: 0 | Status: SHIPPED | OUTPATIENT
Start: 2021-05-21 | End: 2021-05-24

## 2021-06-01 ENCOUNTER — OFFICE VISIT (OUTPATIENT)
Dept: ENT CLINIC | Age: 61
End: 2021-06-01
Payer: COMMERCIAL

## 2021-06-01 ENCOUNTER — PATIENT MESSAGE (OUTPATIENT)
Dept: FAMILY MEDICINE CLINIC | Age: 61
End: 2021-06-01

## 2021-06-01 DIAGNOSIS — J30.9 ALLERGIC RHINITIS, UNSPECIFIED SEASONALITY, UNSPECIFIED TRIGGER: Primary | ICD-10-CM

## 2021-06-01 PROCEDURE — 95004 PERQ TESTS W/ALRGNC XTRCS: CPT | Performed by: OTOLARYNGOLOGY

## 2021-06-01 PROCEDURE — 95024 IQ TESTS W/ALLERGENIC XTRCS: CPT | Performed by: OTOLARYNGOLOGY

## 2021-06-01 NOTE — TELEPHONE ENCOUNTER
From: Harish Alba  To: Lucinda Ruelas NP  Sent: 6/1/2021 10:12 AM EDT  Subject: Prescription Question    We're going back to double the dose of Cymbalta/Duloxetine. ... Have you sent in the updated prescription for that yet?     Thanks in advance,  Silver Ugalde

## 2021-06-08 VITALS — SYSTOLIC BLOOD PRESSURE: 140 MMHG | OXYGEN SATURATION: 95 % | HEART RATE: 76 BPM | DIASTOLIC BLOOD PRESSURE: 86 MMHG

## 2021-06-08 LAB
ALTERNARIA TENUIS IGE, RESP1ALTN: <3 (ref 2–?)
ASPERGILLUS FUMIGATUS 1: <3 (ref 2–?)
BAHIA GRASS,G17A: 11
BERMUDA GRASS IGE, RESP1BERG: 7 (ref ?–5)
BIRCH,TRE1: <3 (ref 2–?)
CAT EPITHELIUM, IGE, CAT: <3 (ref 2–?)
CLADOSPORIUM, IGE, CLAD: <3 (ref 2–?)
COCKROACH,GERMAN, 670778: <3 (ref 2–?)
COTTONWOOD,IGE, CTWD: <3 (ref 2–?)
DOG DANDER,IGE, DOGD: <3 (ref 2–?)
DUST MITE: <3 (ref 2–?)
ELM,IGE, ELM: <3 (ref 2–?)
ENGLISH PLANTAIN, IGE, EGPL: <3 (ref 2–?)
LAMBS QUARTER, IGE, LAMQ: <3 (ref 2–?)
MAPLE/BOX ELDER,TRE3: <3 (ref 2–?)
MOUSE EPITHELIA, 069518: <3 (ref 2–?)
OAK, IGE, OAK: <3 (ref 2–?)
OTHER,OTHU: <3 (ref 2–?)
PIGWEED,WEE7: <3<3 (ref 2–?)
RAGWEED MIX IGE: <3 (ref 2–?)
SYCAMORE,TRE7: <3 (ref 2–?)
T057-IGE CEDAR, RED: <3 (ref 2–?)
TIMOTHY GRASS IGE, RESP1TIMG: 7 (ref ?–5)
WHITE ASH, ASHW1M: <3 (ref 2–?)

## 2021-06-16 ENCOUNTER — OFFICE VISIT (OUTPATIENT)
Dept: FAMILY MEDICINE CLINIC | Age: 61
End: 2021-06-16
Payer: COMMERCIAL

## 2021-06-16 VITALS
BODY MASS INDEX: 39.89 KG/M2 | HEIGHT: 65 IN | SYSTOLIC BLOOD PRESSURE: 130 MMHG | OXYGEN SATURATION: 91 % | HEART RATE: 85 BPM | TEMPERATURE: 97.3 F | RESPIRATION RATE: 16 BRPM | DIASTOLIC BLOOD PRESSURE: 88 MMHG | WEIGHT: 239.4 LBS

## 2021-06-16 DIAGNOSIS — F41.9 ANXIETY: ICD-10-CM

## 2021-06-16 DIAGNOSIS — E66.01 SEVERE OBESITY (BMI 35.0-39.9) WITH COMORBIDITY (HCC): ICD-10-CM

## 2021-06-16 DIAGNOSIS — G47.00 INSOMNIA, UNSPECIFIED TYPE: ICD-10-CM

## 2021-06-16 DIAGNOSIS — I10 HYPERTENSION, UNSPECIFIED TYPE: Primary | ICD-10-CM

## 2021-06-16 DIAGNOSIS — Z12.11 COLON CANCER SCREENING: ICD-10-CM

## 2021-06-16 DIAGNOSIS — M79.7 FIBROMYALGIA: ICD-10-CM

## 2021-06-16 DIAGNOSIS — E78.00 HYPERCHOLESTEROLEMIA: ICD-10-CM

## 2021-06-16 DIAGNOSIS — F90.9 ATTENTION DEFICIT HYPERACTIVITY DISORDER (ADHD), UNSPECIFIED ADHD TYPE: ICD-10-CM

## 2021-06-16 DIAGNOSIS — E55.9 VITAMIN D DEFICIENCY: ICD-10-CM

## 2021-06-16 DIAGNOSIS — Z23 ENCOUNTER FOR IMMUNIZATION: ICD-10-CM

## 2021-06-16 DIAGNOSIS — F33.2 SEVERE EPISODE OF RECURRENT MAJOR DEPRESSIVE DISORDER, WITHOUT PSYCHOTIC FEATURES (HCC): ICD-10-CM

## 2021-06-16 DIAGNOSIS — E03.9 ACQUIRED HYPOTHYROIDISM: ICD-10-CM

## 2021-06-16 PROCEDURE — G8417 CALC BMI ABV UP PARAM F/U: HCPCS | Performed by: NURSE PRACTITIONER

## 2021-06-16 PROCEDURE — G9899 SCRN MAM PERF RSLTS DOC: HCPCS | Performed by: NURSE PRACTITIONER

## 2021-06-16 PROCEDURE — 3017F COLORECTAL CA SCREEN DOC REV: CPT | Performed by: NURSE PRACTITIONER

## 2021-06-16 PROCEDURE — G8754 DIAS BP LESS 90: HCPCS | Performed by: NURSE PRACTITIONER

## 2021-06-16 PROCEDURE — 90670 PCV13 VACCINE IM: CPT | Performed by: NURSE PRACTITIONER

## 2021-06-16 PROCEDURE — G9717 DOC PT DX DEP/BP F/U NT REQ: HCPCS | Performed by: NURSE PRACTITIONER

## 2021-06-16 PROCEDURE — G8752 SYS BP LESS 140: HCPCS | Performed by: NURSE PRACTITIONER

## 2021-06-16 PROCEDURE — G0009 ADMIN PNEUMOCOCCAL VACCINE: HCPCS | Performed by: NURSE PRACTITIONER

## 2021-06-16 PROCEDURE — 99214 OFFICE O/P EST MOD 30 MIN: CPT | Performed by: NURSE PRACTITIONER

## 2021-06-16 PROCEDURE — G8427 DOCREV CUR MEDS BY ELIG CLIN: HCPCS | Performed by: NURSE PRACTITIONER

## 2021-06-16 RX ORDER — DEXTROAMPHETAMINE SACCHARATE, AMPHETAMINE ASPARTATE, DEXTROAMPHETAMINE SULFATE AND AMPHETAMINE SULFATE 2.5; 2.5; 2.5; 2.5 MG/1; MG/1; MG/1; MG/1
10 TABLET ORAL DAILY
Qty: 30 TABLET | Refills: 0 | Status: SHIPPED | OUTPATIENT
Start: 2021-06-16 | End: 2021-06-21

## 2021-06-16 RX ORDER — ESTRADIOL AND NORETHINDRONE ACETATE .5; .1 MG/1; MG/1
TABLET ORAL
COMMUNITY
Start: 2021-06-05 | End: 2021-08-23

## 2021-06-16 NOTE — PROGRESS NOTES
Chief Complaint   Patient presents with    Labs    Depression     1. Have you been to the ER, urgent care clinic since your last visit? Hospitalized since your last visit? No    2. Have you seen or consulted any other health care providers outside of the [address] [place] [address] since your last visit? Include any pap smears or colon screening.  No      3 most recent PHQ Screens 6/16/2021   Little interest or pleasure in doing things Nearly every day   Feeling down, depressed, irritable, or hopeless Nearly every day   Total Score PHQ 2 6   Trouble falling or staying asleep, or sleeping too much More than half the days   Feeling tired or having little energy Nearly every day   Poor appetite, weight loss, or overeating Nearly every day   Feeling bad about yourself - or that you are a failure or have let yourself or your family down Nearly every day   Trouble concentrating on things such as school, work, reading, or watching TV Nearly every day   Moving or speaking so slowly that other people could have noticed; or the opposite being so fidgety that others notice More than half the days   Thoughts of being better off dead, or hurting yourself in some way Nearly every day   PHQ 9 Score 25   How difficult have these problems made it for you to do your work, take care of your home and get along with others Very difficult

## 2021-06-16 NOTE — PROGRESS NOTES
Subjective  Chief Complaint   Patient presents with    Labs    Depression     medicare depression screening     HPI:  Laurie Lal is a 64 y.o. female. 63 yo female presents for f/u of chronic conditions with fasting labs. She also so has a positive depression screening because this is the anniversary of her sister's suicide. She has previously seen a counselor when she lived in [place] but no longer and she has an appointment with another therapist coming up. She would like a referral to a psychiatrist and will call and let us know which of the 4 psychiatrists that she has in mind is taking patients and will call us and let us know so we can do a referral.   She is updating her prevnar 13 today and she has had both of the Covid vaccines. Patient was given a referral for screening colonoscopy and will call for appointment in the AM.  She has been followed by ENT for sinus issues and now that it is somethat cleared up she is getting back to consistent use of her cpap. I have given her a referral for her screening colonoscopy. Patient feels that it would be helpful to treat her ADHD with a low dose of adderall.   She will sign an ROOPA for the physician that formally diagnosed her so we can obtain those records    Past Medical History:   Diagnosis Date    Acquired talipes planus, unspecified laterality     Anemia     Anxiety     Bronchitis     Chronic pain     neck and back    Dysphagia     Hashimoto's thyroiditis     Headache     Hypercholesterolemia     Hypertensive disorder     Insomnia     Osteoarthritis     Plantar fasciitis     Seasonal allergic reaction      Family History   Problem Relation Age of Onset    COPD Mother     COPD Father     Heart Attack Father     Cancer Sister         skin    Diabetes Brother     Breast Cancer Paternal Grandmother      Social History     Socioeconomic History    Marital status:      Spouse name: Not on file    Number of children: Not on file    Years of education: Not on file    Highest education level: Not on file   Occupational History    Not on file   Tobacco Use    Smoking status: Never Smoker    Smokeless tobacco: Never Used   Vaping Use    Vaping Use: Some days    Substances: CBD   Substance and Sexual Activity    Alcohol use: Not Currently     Alcohol/week: 1.0 standard drinks     Types: 1 Glasses of wine per week    Drug use: Yes     Types: Marijuana     Comment: medical    Sexual activity: Never   Other Topics Concern    Not on file   Social History Narrative    Not on file     Social Determinants of Health     Financial Resource Strain:     Difficulty of Paying Living Expenses:    Food Insecurity:     Worried About Running Out of Food in the Last Year:     Ran Out of Food in the Last Year:    Transportation Needs:     Lack of Transportation (Medical):  Lack of Transportation (Non-Medical):    Physical Activity:     Days of Exercise per Week:     Minutes of Exercise per Session:    Stress:     Feeling of Stress :    Social Connections:     Frequency of Communication with Friends and Family:     Frequency of Social Gatherings with Friends and Family:     Attends Sabianist Services:     Active Member of Clubs or Organizations:     Attends Club or Organization Meetings:     Marital Status:    Intimate Partner Violence:     Fear of Current or Ex-Partner:     Emotionally Abused:     Physically Abused:     Sexually Abused:      Current Outpatient Medications on File Prior to Visit   Medication Sig Dispense Refill    estradiol-norethindrone (ACTIVELLA) 0.5-0.1 mg per tablet       diclofenac EC (VOLTAREN) 75 mg EC tablet TAKE ONE TABLET BY MOUTH TWICE A  Tab 1    DULoxetine (CYMBALTA) 60 mg capsule Take 1 Cap by mouth two (2) times a day.  180 Cap 1    modafiniL (PROVIGIL) 200 mg tablet TAKE 1 TABLET BY MOUTH TWICE DAILY (MAX DAILY AMOUNT IS 2 TABLETS) 60 Tab 1    levothyroxine (SYNTHROID) 75 mcg tablet TAKE ONE TABLET BY MOUTH ONE TIME DAILY 90 Tab 1    traZODone (DESYREL) 100 mg tablet TAKE FOUR TABLETS BY MOUTH ONE TIME DAILY AT BEDTIME 360 Tab 1    nystatin (MYCOSTATIN) 100,000 unit/mL suspension TAKE 3 TEASPOONS (15 ML) BY MOUTH 4 TIMES PER  mL 3    montelukast (SINGULAIR) 10 mg tablet Take 1 Tab by mouth daily. 90 Tab 2    atorvastatin (LIPITOR) 10 mg tablet TAKE ONE TABLET BY MOUTH ONE TIME DAILY 90 Tab 1    pregabalin (LYRICA) 100 mg capsule TAKE 1 CAPSULE BY MOUTH TWICE A  Cap 1    cetirizine (ZYRTEC) 10 mg tablet Take 10 mg by mouth daily.  losartan (COZAAR) 100 mg tablet TAKE 1 TABLET BY MOUTH EVERY DAY 90 Tab 1    cholecalciferol (VITAMIN D3) (2,000 UNITS /50 MCG) cap capsule Take 1 capsule by mouth daily      cyanocobalamin 2,000 mcg TbER Take 1 tablet by mouth daily      fish oil-omega-3 fatty acids (Fish Oil) 340-1,000 mg capsule Take 2 tablets by mouth daily      AZO CRANBERRY (CRANBERRY-PROBIOTICS-VITAMIN C) 344-76-11 mg-mg-million tab Take  by mouth.  fluticasone-vilanterol (BREO ELLIPTA) 100-25 mcg/dose inhaler Take 1 Puff by inhalation daily.  benzocaine-menthoL (Cepacol Sore Throat, gato-men,) 15-2.6 mg lozg lozenge Take 1 Lozenge by mouth every two (2) hours as needed for Sore throat. (Patient not taking: Reported on 6/16/2021) 18 Lozenge 2    ondansetron (ZOFRAN ODT) 8 mg disintegrating tablet Take 1 Tab by mouth every eight (8) hours as needed for Nausea or Vomiting. (Patient not taking: Reported on 6/16/2021) 21 Tab 0    CombiPatch 0.05-0.14 mg/24 hr APPLY 1 PATCH TWICE WEEKLY (Patient not taking: Reported on 6/16/2021) 24 Patch 5    eszopiclone (LUNESTA) 2 mg tablet Take 1 Tab by mouth nightly. Max Daily Amount: 2 mg.  (Patient not taking: Reported on 6/16/2021) 90 Tab 1    diclofenac (VOLTAREN) 1 % gel APPLY 2 GRAM TO THE AFFECTED AREA(S) BY TOPICAL ROUTE 4 TIMES PER DAY (Patient not taking: Reported on 6/16/2021) 100 g 3    vitamin B complex (B COMPLEX 1 PO) Take  by mouth. (Patient not taking: Reported on 6/16/2021)       No current facility-administered medications on file prior to visit. Allergies   Allergen Reactions    Methylprednisolone Unknown (comments)    Silicone Rash    Penicillins Nausea and Vomiting     ROS   ROS per HPI and PMH      Objective  Physical Exam  Vitals reviewed. HENT:      Head: Normocephalic. Pulmonary:      Effort: Pulmonary effort is normal.      Breath sounds: Normal breath sounds. Abdominal:      General: Bowel sounds are normal.      Palpations: Abdomen is soft. Skin:     General: Skin is warm and dry. Neurological:      Mental Status: She is alert and oriented to person, place, and time. Assessment & Plan      ICD-10-CM ICD-9-CM    1. Hypertension, unspecified type  I10 401.9    2. Acquired hypothyroidism  E03.9 244.9    3. Severe episode of recurrent major depressive disorder, without psychotic features [facility]  F33.2 296.33    4. Fibromyalgia  M79.7 729.1    5. Hypercholesterolemia  E78.00 272.0    6. Severe obesity (BMI 35.0-39. 9) with comorbidity ([facility]  E66.01 278.01    7. Anxiety  F41.9 300.00    8. Insomnia, unspecified type  G47.00 780.52      Diagnoses and all orders for this visit:    1. Hypertension, unspecified type  BP appears at goal at 130/88. We will continue with the use of losartan for the treatment of this condition    2. Acquired hypothyroidism  Obtaining update TSH and T4 for trending and will make treatment decisions when I get the results    3. Severe episode of recurrent major depressive disorder, without psychotic features ([facility]  Patient is on cymbalta and will be meeting with a new therapist next week. She will call the office when she finds which [employer] psychiatrist is taking new patients and we will make the referral    4. Fibromyalgia  Patient is followed by rheumatology for this condition    5.  Hypercholesterolemia  Obtaining updated lipid for trending and will make treatment decisions when I get the results    6. Severe obesity (BMI 35.0-39. 9) with comorbidity [facility])  Patient is experiencing an exacerbation with her depression and anxiety secondary to this being the 15th anniversary of her sister's suicide. I am postponing discussion of weight loss until we get her anxiety and depression under control    7. Anxiety  Patient will be meeting with a new therapist in the next week or so and she will also be calling the office when she finds a psychiatrist that is taking new patients and we will make the referral    8. Insomnia, unspecified type  Patient is taking the trazadone (4 tabs) for sleep.   Will continue with the use of trazadone for her insomnia      Follow-up and Dispositions    · Return in about 6 months (around 12/16/2021) for [address] with fasting labs and physical.       Geoff Vidal NP

## 2021-06-17 LAB
25(OH)D3+25(OH)D2 SERPL-MCNC: 45.8 NG/ML (ref 30–100)
ALBUMIN SERPL-MCNC: 4.8 G/DL (ref 3.8–4.9)
ALBUMIN/GLOB SERPL: 2 {RATIO} (ref 1.2–2.2)
ALP SERPL-CCNC: 95 IU/L (ref 48–121)
ALT SERPL-CCNC: 28 IU/L (ref 0–32)
AST SERPL-CCNC: 23 IU/L (ref 0–40)
BASOPHILS # BLD AUTO: 0.1 X10E3/UL (ref 0–0.2)
BASOPHILS NFR BLD AUTO: 1 %
BILIRUB SERPL-MCNC: 0.4 MG/DL (ref 0–1.2)
BUN SERPL-MCNC: 12 MG/DL (ref 6–24)
BUN/CREAT SERPL: 15 (ref 9–23)
CALCIUM SERPL-MCNC: 9.3 MG/DL (ref 8.7–10.2)
CHLORIDE SERPL-SCNC: 100 MMOL/L (ref 96–106)
CHOLEST SERPL-MCNC: 178 MG/DL (ref 100–199)
CO2 SERPL-SCNC: 22 MMOL/L (ref 20–29)
CREAT SERPL-MCNC: 0.81 MG/DL (ref 0.57–1)
EOSINOPHIL # BLD AUTO: 0.3 X10E3/UL (ref 0–0.4)
EOSINOPHIL NFR BLD AUTO: 3 %
ERYTHROCYTE [DISTWIDTH] IN BLOOD BY AUTOMATED COUNT: 13.9 % (ref 11.7–15.4)
GLOBULIN SER CALC-MCNC: 2.4 G/DL (ref 1.5–4.5)
GLUCOSE SERPL-MCNC: 94 MG/DL (ref 65–99)
HCT VFR BLD AUTO: 42 % (ref 34–46.6)
HDLC SERPL-MCNC: 46 MG/DL
HGB BLD-MCNC: 14.4 G/DL (ref 11.1–15.9)
IMM GRANULOCYTES # BLD AUTO: 0 X10E3/UL (ref 0–0.1)
IMM GRANULOCYTES NFR BLD AUTO: 0 %
LDLC SERPL CALC-MCNC: 109 MG/DL (ref 0–99)
LYMPHOCYTES # BLD AUTO: 2.2 X10E3/UL (ref 0.7–3.1)
LYMPHOCYTES NFR BLD AUTO: 26 %
MCH RBC QN AUTO: 30.2 PG (ref 26.6–33)
MCHC RBC AUTO-ENTMCNC: 34.3 G/DL (ref 31.5–35.7)
MCV RBC AUTO: 88 FL (ref 79–97)
MONOCYTES # BLD AUTO: 0.5 X10E3/UL (ref 0.1–0.9)
MONOCYTES NFR BLD AUTO: 6 %
NEUTROPHILS # BLD AUTO: 5.7 X10E3/UL (ref 1.4–7)
NEUTROPHILS NFR BLD AUTO: 64 %
PLATELET # BLD AUTO: 272 X10E3/UL (ref 150–450)
POTASSIUM SERPL-SCNC: 4.6 MMOL/L (ref 3.5–5.2)
PROT SERPL-MCNC: 7.2 G/DL (ref 6–8.5)
RBC # BLD AUTO: 4.77 X10E6/UL (ref 3.77–5.28)
SODIUM SERPL-SCNC: 137 MMOL/L (ref 134–144)
T4 FREE SERPL-MCNC: 1.61 NG/DL (ref 0.82–1.77)
TRIGL SERPL-MCNC: 127 MG/DL (ref 0–149)
TSH SERPL DL<=0.005 MIU/L-ACNC: 1.96 UIU/ML (ref 0.45–4.5)
VLDLC SERPL CALC-MCNC: 23 MG/DL (ref 5–40)
WBC # BLD AUTO: 8.7 X10E3/UL (ref 3.4–10.8)

## 2021-06-17 NOTE — PROGRESS NOTES
Your  labs are basically normal.  Some values may be minimally outside the  \"normal\" range but are not harmful or clinically significant. Please contact the office if you have questions or concerns.   We will recheck your labs at your next office visit

## 2021-06-18 DIAGNOSIS — M79.7 FIBROMYALGIA: ICD-10-CM

## 2021-06-18 RX ORDER — PREGABALIN 100 MG/1
CAPSULE ORAL
Qty: 180 CAPSULE | Refills: 1 | Status: SHIPPED | OUTPATIENT
Start: 2021-06-18 | End: 2021-11-19

## 2021-06-21 ENCOUNTER — TELEPHONE (OUTPATIENT)
Dept: FAMILY MEDICINE CLINIC | Age: 61
End: 2021-06-21

## 2021-06-21 DIAGNOSIS — M79.7 FIBROMYALGIA: ICD-10-CM

## 2021-06-21 RX ORDER — MODAFINIL 200 MG/1
TABLET ORAL
Qty: 60 TABLET | Refills: 0 | Status: SHIPPED | OUTPATIENT
Start: 2021-06-21 | End: 2021-07-12 | Stop reason: ALTCHOICE

## 2021-06-21 RX ORDER — MODAFINIL 200 MG/1
TABLET ORAL
Qty: 60 TABLET | Refills: 0 | Status: CANCELLED | OUTPATIENT
Start: 2021-06-21

## 2021-06-21 NOTE — TELEPHONE ENCOUNTER
Spoke to [facility] she stated that pt got it fill on 3/27. She said that this would be the first time she has had to fill this medication early.

## 2021-06-21 NOTE — TELEPHONE ENCOUNTER
Can you call the [facility] and see where we are with her being short \"a week\" of lyrical.  Ask them what day she filled it (I guess it is new and not showing up in the .

## 2021-06-21 NOTE — TELEPHONE ENCOUNTER
Message sent to pt via [employer] advising that rx will be filled early do to missing tabs. See other message dated today.

## 2021-06-22 ENCOUNTER — OFFICE VISIT (OUTPATIENT)
Dept: ENT CLINIC | Age: 61
End: 2021-06-22

## 2021-06-22 DIAGNOSIS — J30.89 ALLERGIC RHINITIS DUE TO OTHER ALLERGIC TRIGGER, UNSPECIFIED SEASONALITY: Primary | ICD-10-CM

## 2021-06-22 NOTE — PROGRESS NOTES
Administered positive and negative controls both intradermal and scratch test for comparison with results from prior testing 0n 06/01/2021. Scratch histamine was 5mm on 06/01 and 6mm today 06/22. Intradermal histamine was 5mm on 06/01 and 9mm today 06/22.  Abbi

## 2021-07-02 ENCOUNTER — PATIENT MESSAGE (OUTPATIENT)
Dept: FAMILY MEDICINE CLINIC | Age: 61
End: 2021-07-02

## 2021-07-02 RX ORDER — DULOXETIN HYDROCHLORIDE 60 MG/1
60 CAPSULE, DELAYED RELEASE ORAL 2 TIMES DAILY
Qty: 180 CAPSULE | Refills: 1 | Status: SHIPPED | OUTPATIENT
Start: 2021-07-02 | End: 2021-08-06 | Stop reason: SDUPTHER

## 2021-07-02 NOTE — TELEPHONE ENCOUNTER
From: Edward Plunkett  To: Minesh Jarvis NP  Sent: 7/2/2021 1:31 PM EDT  Subject: Prescription Question    If you would please send in the duloxetine prescription for 60 mg. We had cut back to 30 mg but the pain is getting to be too much, not to mention the depression    Happy holiday weekend!

## 2021-07-14 PROBLEM — K59.4 ANAL SPASM: Status: ACTIVE | Noted: 2021-07-14

## 2021-07-15 ENCOUNTER — TELEPHONE (OUTPATIENT)
Dept: FAMILY MEDICINE CLINIC | Age: 61
End: 2021-07-15

## 2021-07-15 NOTE — TELEPHONE ENCOUNTER
I see you have been communicating with the pt about their meds. [facility] sent an alternative request via fax for the [facility]. No alternatives were suggested.

## 2021-07-20 DIAGNOSIS — M79.7 FIBROMYALGIA: ICD-10-CM

## 2021-07-20 NOTE — TELEPHONE ENCOUNTER
The Sunosi is not covered by the pt's benefit plan.  -[facility] has closed the request as the sunosi is non formulary and there is no prior auth or appeals process available

## 2021-07-21 ENCOUNTER — TELEPHONE (OUTPATIENT)
Dept: FAMILY MEDICINE CLINIC | Age: 61
End: 2021-07-21

## 2021-07-21 DIAGNOSIS — G47.9 SLEEP DISORDER: Primary | ICD-10-CM

## 2021-07-21 RX ORDER — MODAFINIL 200 MG/1
TABLET ORAL
Qty: 60 TABLET | Refills: 0 | Status: SHIPPED | OUTPATIENT
Start: 2021-07-21 | End: 2021-08-06 | Stop reason: SDUPTHER

## 2021-07-21 RX ORDER — MODAFINIL 100 MG/1
100 TABLET ORAL
Qty: 30 TABLET | Refills: 0 | Status: SHIPPED | OUTPATIENT
Start: 2021-07-21 | End: 2021-07-21 | Stop reason: ALTCHOICE

## 2021-07-21 NOTE — TELEPHONE ENCOUNTER
Called pt to inform that the sunosi will not be approved by "[employer]. Pt is also waiting on a refill for her Medication for her fibromyalgia (modafinal) which has been discontinued on her profile.

## 2021-07-21 NOTE — TELEPHONE ENCOUNTER
She may be stuck with what she is on.  It does not sound like she has a lot of choice unless she is going to pay cash

## 2021-08-02 RX ORDER — AMOXICILLIN AND CLAVULANATE POTASSIUM 875; 125 MG/1; MG/1
1 TABLET, FILM COATED ORAL EVERY 12 HOURS
Qty: 20 TABLET | Refills: 1 | Status: SHIPPED | OUTPATIENT
Start: 2021-08-02 | End: 2021-12-22 | Stop reason: ALTCHOICE

## 2021-08-06 DIAGNOSIS — M79.7 FIBROMYALGIA: ICD-10-CM

## 2021-08-06 RX ORDER — DOXYCYCLINE 100 MG/1
100 TABLET ORAL 2 TIMES DAILY
Qty: 20 TABLET | Refills: 0 | Status: SHIPPED | OUTPATIENT
Start: 2021-08-06 | End: 2021-08-16

## 2021-08-06 RX ORDER — ETHYL CHLORIDE 100 %
1 AEROSOL, SPRAY (ML) TOPICAL DAILY
Qty: 100 ML | Refills: 1 | Status: SHIPPED | OUTPATIENT
Start: 2021-08-06 | End: 2021-12-22 | Stop reason: ALTCHOICE

## 2021-08-06 RX ORDER — MODAFINIL 200 MG/1
TABLET ORAL
Qty: 60 TABLET | Refills: 0 | Status: SHIPPED | OUTPATIENT
Start: 2021-08-06 | End: 2021-10-03

## 2021-08-06 RX ORDER — DULOXETIN HYDROCHLORIDE 60 MG/1
60 CAPSULE, DELAYED RELEASE ORAL 2 TIMES DAILY
Qty: 180 CAPSULE | Refills: 1 | Status: SHIPPED | OUTPATIENT
Start: 2021-08-06 | End: 2021-11-24

## 2021-08-23 RX ORDER — ESTRADIOL AND NORETHINDRONE ACETATE .5; .1 MG/1; MG/1
TABLET ORAL
Qty: 84 TABLET | Refills: 1 | Status: SHIPPED | OUTPATIENT
Start: 2021-08-23 | End: 2021-12-22 | Stop reason: ALTCHOICE

## 2021-08-31 ENCOUNTER — OFFICE VISIT (OUTPATIENT)
Dept: ENT CLINIC | Age: 61
End: 2021-08-31
Payer: COMMERCIAL

## 2021-08-31 ENCOUNTER — OFFICE VISIT (OUTPATIENT)
Dept: ENT CLINIC | Age: 61
End: 2021-08-31

## 2021-08-31 VITALS — SYSTOLIC BLOOD PRESSURE: 140 MMHG | HEART RATE: 82 BPM | OXYGEN SATURATION: 96 % | DIASTOLIC BLOOD PRESSURE: 84 MMHG

## 2021-08-31 VITALS
HEART RATE: 82 BPM | OXYGEN SATURATION: 98 % | WEIGHT: 239 LBS | RESPIRATION RATE: 16 BRPM | SYSTOLIC BLOOD PRESSURE: 140 MMHG | BODY MASS INDEX: 39.82 KG/M2 | HEIGHT: 65 IN | DIASTOLIC BLOOD PRESSURE: 84 MMHG | TEMPERATURE: 97.3 F

## 2021-08-31 DIAGNOSIS — J30.89 ALLERGIC RHINITIS DUE TO OTHER ALLERGIC TRIGGER, UNSPECIFIED SEASONALITY: Primary | ICD-10-CM

## 2021-08-31 DIAGNOSIS — J30.9 ALLERGIC RHINITIS, UNSPECIFIED SEASONALITY, UNSPECIFIED TRIGGER: Primary | ICD-10-CM

## 2021-08-31 DIAGNOSIS — J34.2 DNS (DEVIATED NASAL SEPTUM): ICD-10-CM

## 2021-08-31 DIAGNOSIS — J32.0 CHRONIC MAXILLARY SINUSITIS: ICD-10-CM

## 2021-08-31 PROCEDURE — 95024 IQ TESTS W/ALLERGENIC XTRCS: CPT | Performed by: OTOLARYNGOLOGY

## 2021-08-31 PROCEDURE — 99214 OFFICE O/P EST MOD 30 MIN: CPT | Performed by: OTOLARYNGOLOGY

## 2021-08-31 PROCEDURE — G9899 SCRN MAM PERF RSLTS DOC: HCPCS | Performed by: OTOLARYNGOLOGY

## 2021-08-31 PROCEDURE — G9717 DOC PT DX DEP/BP F/U NT REQ: HCPCS | Performed by: OTOLARYNGOLOGY

## 2021-08-31 PROCEDURE — G8427 DOCREV CUR MEDS BY ELIG CLIN: HCPCS | Performed by: OTOLARYNGOLOGY

## 2021-08-31 PROCEDURE — G8753 SYS BP > OR = 140: HCPCS | Performed by: OTOLARYNGOLOGY

## 2021-08-31 PROCEDURE — G8754 DIAS BP LESS 90: HCPCS | Performed by: OTOLARYNGOLOGY

## 2021-08-31 PROCEDURE — G8417 CALC BMI ABV UP PARAM F/U: HCPCS | Performed by: OTOLARYNGOLOGY

## 2021-08-31 PROCEDURE — 95004 PERQ TESTS W/ALRGNC XTRCS: CPT | Performed by: OTOLARYNGOLOGY

## 2021-08-31 PROCEDURE — 3017F COLORECTAL CA SCREEN DOC REV: CPT | Performed by: OTOLARYNGOLOGY

## 2021-08-31 NOTE — PROGRESS NOTES
Subjective:    Jozef Dyer   64 y.o.   1960     Follow-up visit    Initial HPI  Location - nose, sinus    Quality - chronic sinus, allergies    Severity -  Moderate, severe    Duration - years    Timing - chronic    Context - pt with multiple allergy symptoms and complaints, has been on IT with Dr Chiquis Waddell but had to change providers due to insurance changes; she has been on multiple PO and nasal sprays with minimal improvement; she feels that she has a sinus infection for past 2 years, multiple abx in past 1 year; no recent imaging    Modifying Features - worse with allergies    Associated symptoms/signs - cough, ear pain    Follow-up HPI  8/31/2021 - 8 months followup - pt had her allergy testing today - still c/o congestion, coughing; has not been able to use her CPAP; she is on flonase, c/o throat irritation    Review of Systems  Review of Systems   Constitutional: Negative for chills and fever. HENT: Positive for congestion, ear pain and sinus pain. Negative for hearing loss, nosebleeds and tinnitus. Eyes: Negative for blurred vision and double vision. Respiratory: Positive for cough. Negative for sputum production and shortness of breath. Cardiovascular: Negative for chest pain and palpitations. Gastrointestinal: Negative for heartburn, nausea and vomiting. Musculoskeletal: Negative for joint pain and neck pain. Skin: Negative. Neurological: Positive for headaches. Negative for dizziness, speech change and weakness. Endo/Heme/Allergies: Positive for environmental allergies. Does not bruise/bleed easily. Psychiatric/Behavioral: Negative for memory loss. The patient does not have insomnia.           Past Medical History:   Diagnosis Date    Acquired talipes planus, unspecified laterality     Anemia     Anxiety     Bronchitis     Chronic pain     neck and back    Dysphagia     Hashimoto's thyroiditis     Headache     Hypercholesterolemia     Hypertensive disorder     Insomnia     Osteoarthritis     Plantar fasciitis     Seasonal allergic reaction      Past Surgical History:   Procedure Laterality Date    HX GYN  2011    endometrioma removal      Family History   Problem Relation Age of Onset   Melo Diaz COPD Mother    Melo Diaz COPD Father     Heart Attack Father     Cancer Sister         skin    Diabetes Brother     Breast Cancer Paternal Grandmother      Social History     Tobacco Use    Smoking status: Never Smoker    Smokeless tobacco: Never Used   Substance Use Topics    Alcohol use: Not Currently     Alcohol/week: 1.0 standard drinks     Types: 1 Glasses of wine per week      Prior to Admission medications    Medication Sig Start Date End Date Taking? Authorizing Provider   estradiol-norethindrone (ACTIVELLA) 0.5-0.1 mg per tablet TAKE ONE TABLET BY MOUTH ONE TIME DAILY 8/23/21   Merline Lewis NP   modafiniL (PROVIGIL) 200 mg tablet TAKE 1 TABLET BY MOUTH TWICE DAILY MAX DAILY AMOUNT OF 2 8/6/21   Merline Lewis NP   DULoxetine (CYMBALTA) 60 mg capsule Take 1 Capsule by mouth two (2) times a day. 8/6/21   Merline Lewis NP   ethyl chloride 100 % spray Apply 1 mL to affected area daily. 8/6/21   Merline Lewis NP   amoxicillin-clavulanate (AUGMENTIN) 875-125 mg per tablet Take 1 Tablet by mouth every twelve (12) hours. 8/2/21   Merline Lewis NP   solriamfetoL [facility]) 75 mg tab Take 1 Tablet by mouth daily. Max Daily Amount: 1 Tablet. 7/12/21   Merline Lewis NP   pregabalin (LYRICA) 100 mg capsule TAKE 1 CAPSULE BY MOUTH TWICE A DAY 6/18/21   Merline Lewis NP   levothyroxine (SYNTHROID) 75 mcg tablet TAKE ONE TABLET BY MOUTH ONE TIME DAILY 3/19/21   Merline Lewis NP   nystatin (MYCOSTATIN) 100,000 unit/mL suspension TAKE 3 TEASPOONS (15 ML) BY MOUTH 4 TIMES PER DAY 2/1/21   Merline Lewis NP   montelukast (SINGULAIR) 10 mg tablet Take 1 Tab by mouth daily.  1/27/21   Merline Lewis NP   atorvastatin (LIPITOR) 10 mg tablet TAKE ONE TABLET BY MOUTH ONE TIME DAILY 1/24/21   Merline Lewis NP   cetirizine (ZYRTEC) 10 mg tablet Take 10 mg by mouth daily. Provider, Historical   losartan (COZAAR) 100 mg tablet TAKE 1 TABLET BY MOUTH EVERY DAY 10/19/20   Brandie Smith, CHANEL   cholecalciferol (VITAMIN D3) (2,000 UNITS /50 MCG) cap capsule Take 1 capsule by mouth daily    Provider, Historical   cyanocobalamin 2,000 mcg TbER Take 1 tablet by mouth daily    Provider, Historical   fish oil-omega-3 fatty acids (Fish Oil) 340-1,000 mg capsule Take 2 tablets by mouth daily    Provider, Historical   vitamin B complex (B COMPLEX 1 PO) Take  by mouth. Patient not taking: Reported on 6/16/2021    Provider, Historical   AZO CRANBERRY (CRANBERRY-PROBIOTICS-VITAMIN C) 922-05-01 mg-mg-million tab Take  by mouth. Provider, Historical   fluticasone-vilanterol (BREO ELLIPTA) 100-25 mcg/dose inhaler Take 1 Puff by inhalation daily. Provider, Historical        Allergies   Allergen Reactions    Methylprednisolone Unknown (comments)    Silicone Rash    Penicillins Nausea and Vomiting         Objective:     Visit Vitals  BP (!) 140/84 (BP 1 Location: Left upper arm, BP Patient Position: Sitting, BP Cuff Size: Adult)   Pulse 82   Temp 97.3 °F (36.3 °C) (Temporal)   Resp 16   Ht 5' 5\" (1.651 m)   Wt 239 lb (108.4 kg)   SpO2 98%   BMI 39.77 kg/m²        Physical Exam  Vitals reviewed. Constitutional:       General: She is awake. She is not in acute distress. Appearance: Normal appearance. She is well-groomed. She is obese. HENT:      Head: Normocephalic and atraumatic. Jaw: There is normal jaw occlusion. No trismus, tenderness or malocclusion. Salivary Glands: Right salivary gland is not diffusely enlarged or tender. Left salivary gland is not diffusely enlarged or tender. Right Ear: Hearing, tympanic membrane, ear canal and external ear normal.      Left Ear: Hearing, tympanic membrane, ear canal and external ear normal.      Ears:      Roa exam findings: does not lateralize.      Right Rinne: AC > BC.     Left Rinne: AC > BC. Nose: Septal deviation (left) and mucosal edema present. No nasal deformity. Right Nostril: No epistaxis. Left Nostril: No epistaxis. Right Turbinates: Enlarged and swollen. Not pale. Left Turbinates: Enlarged. Not swollen or pale. Right Sinus: No maxillary sinus tenderness or frontal sinus tenderness. Left Sinus: No maxillary sinus tenderness or frontal sinus tenderness. Mouth/Throat:      Lips: Pink. No lesions. Mouth: Mucous membranes are moist. No oral lesions. Dentition: Normal dentition. No dental caries. Tongue: No lesions. Palate: No mass and lesions. Pharynx: Oropharynx is clear. Uvula midline. No oropharyngeal exudate or posterior oropharyngeal erythema. Tonsils: No tonsillar exudate. 0 on the right. 0 on the left. Eyes:      General: Vision grossly intact. Extraocular Movements: Extraocular movements intact. Right eye: No nystagmus. Left eye: No nystagmus. Conjunctiva/sclera: Conjunctivae normal.      Pupils: Pupils are equal, round, and reactive to light. Neck:      Thyroid: No thyroid mass, thyromegaly or thyroid tenderness. Trachea: Trachea and phonation normal. No tracheal tenderness or tracheal deviation. Cardiovascular:      Rate and Rhythm: Normal rate and regular rhythm. Pulmonary:      Effort: Pulmonary effort is normal. No respiratory distress. Breath sounds: No stridor. Musculoskeletal:         General: No swelling or tenderness. Normal range of motion. Cervical back: No edema or erythema. Lymphadenopathy:      Cervical: No cervical adenopathy. Skin:     General: Skin is warm and dry. Findings: No lesion or rash. Neurological:      General: No focal deficit present. Mental Status: She is alert and oriented to person, place, and time. Mental status is at baseline. Cranial Nerves: Cranial nerves are intact.       Coordination: Romberg sign negative. Gait: Gait is intact. Psychiatric:         Mood and Affect: Mood normal.         Behavior: Behavior normal. Behavior is cooperative. Assessment/Plan:     Encounter Diagnoses   Name Primary?  Allergic rhinitis, unspecified seasonality, unspecified trigger Yes    DNS (deviated nasal septum)     Chronic maxillary sinusitis      I have reviewed her prior records from allergists, testing  She does have grass, weed, heller and mouse allergy still  She wants to proceed with IT    I reviewed her CT from outside ENT - she has severe DNS to left, narrowed OMUs  We discussed septo/mini-ESS and she wants to proceed.     Will aim for 9/23 @ [facility]    Orders Placed This Encounter    REFERRAL TO ALLERGY

## 2021-08-31 NOTE — PROGRESS NOTES
Visit Vitals  BP (!) 140/84 (BP 1 Location: Left upper arm, BP Patient Position: Sitting, BP Cuff Size: Adult)   Pulse 82   Temp 97.3 °F (36.3 °C) (Temporal)   Resp 16   Ht 5' 5\" (1.651 m)   Wt 239 lb (108.4 kg)   SpO2 98%   BMI 39.77 kg/m²     Chief Complaint   Patient presents with    Follow-up     allergy test F/U

## 2021-09-21 ENCOUNTER — PATIENT MESSAGE (OUTPATIENT)
Dept: ENT CLINIC | Age: 61
End: 2021-09-21

## 2021-09-22 ENCOUNTER — TELEPHONE (OUTPATIENT)
Dept: ENT CLINIC | Age: 61
End: 2021-09-22

## 2021-09-28 ENCOUNTER — TELEPHONE (OUTPATIENT)
Dept: ENT CLINIC | Age: 61
End: 2021-09-28

## 2021-10-02 DIAGNOSIS — I10 ESSENTIAL (PRIMARY) HYPERTENSION: ICD-10-CM

## 2021-10-02 DIAGNOSIS — E78.00 HYPERCHOLESTEROLEMIA: ICD-10-CM

## 2021-10-02 DIAGNOSIS — M79.7 FIBROMYALGIA: ICD-10-CM

## 2021-10-03 RX ORDER — ATORVASTATIN CALCIUM 10 MG/1
TABLET, FILM COATED ORAL
Qty: 90 TABLET | Refills: 1 | Status: SHIPPED | OUTPATIENT
Start: 2021-10-03 | End: 2022-04-07

## 2021-10-03 RX ORDER — MODAFINIL 200 MG/1
TABLET ORAL
Qty: 60 TABLET | Refills: 0 | Status: SHIPPED | OUTPATIENT
Start: 2021-10-03 | End: 2021-11-05

## 2021-10-03 RX ORDER — LOSARTAN POTASSIUM 100 MG/1
TABLET ORAL
Qty: 90 TABLET | Refills: 2 | Status: SHIPPED | OUTPATIENT
Start: 2021-10-03 | End: 2022-03-24

## 2021-10-07 ENCOUNTER — TELEPHONE (OUTPATIENT)
Dept: ENT CLINIC | Age: 61
End: 2021-10-07

## 2021-10-07 NOTE — TELEPHONE ENCOUNTER
Spoke with pt she stated she is having insurance problems and she is trying to get everything fix. At the moment she wants to hold off on allergy shots and surgery. She will call us back when she is ready to move forward.

## 2021-10-11 RX ORDER — DOXYCYCLINE 100 MG/1
TABLET ORAL
Qty: 20 TABLET | Refills: 0 | Status: SHIPPED | OUTPATIENT
Start: 2021-10-11 | End: 2021-12-22 | Stop reason: ALTCHOICE

## 2021-10-19 NOTE — TELEPHONE ENCOUNTER
Spoke with patient concerning starting immunotherapy. She would like to have surgery first. States her insurance issues are taken are of now.  Notified surgery scheduler of patients request. Will hold off on immunotherapy until after surgery per patient request. [facility]

## 2021-11-05 DIAGNOSIS — M79.7 FIBROMYALGIA: ICD-10-CM

## 2021-11-05 RX ORDER — MODAFINIL 200 MG/1
TABLET ORAL
Qty: 60 TABLET | Refills: 0 | Status: SHIPPED | OUTPATIENT
Start: 2021-11-05 | End: 2021-11-24

## 2021-11-08 ENCOUNTER — TELEPHONE (OUTPATIENT)
Dept: ENT CLINIC | Age: 61
End: 2021-11-08

## 2021-11-08 NOTE — TELEPHONE ENCOUNTER
Pt resolved the issue she was having with her insurance and would like to move forward with Surgery.

## 2021-11-15 ENCOUNTER — PATIENT MESSAGE (OUTPATIENT)
Dept: ENT CLINIC | Age: 61
End: 2021-11-15

## 2021-11-17 DIAGNOSIS — M79.7 FIBROMYALGIA: ICD-10-CM

## 2021-11-19 ENCOUNTER — PATIENT MESSAGE (OUTPATIENT)
Dept: FAMILY MEDICINE CLINIC | Age: 61
End: 2021-11-19

## 2021-11-19 RX ORDER — PREGABALIN 100 MG/1
CAPSULE ORAL
Qty: 180 CAPSULE | Refills: 0 | Status: SHIPPED | OUTPATIENT
Start: 2021-11-19 | End: 2022-02-28

## 2021-11-19 RX ORDER — LEVOFLOXACIN 500 MG/1
500 TABLET, FILM COATED ORAL DAILY
Qty: 10 TABLET | Refills: 1 | Status: SHIPPED | OUTPATIENT
Start: 2021-11-19 | End: 2021-11-24 | Stop reason: ALTCHOICE

## 2021-11-19 NOTE — TELEPHONE ENCOUNTER
Last [id number] and no inconsistencies noted in .   Patient appears to have an appointment with Dr. Manuel Paris as a new Patient [id number]

## 2021-11-19 NOTE — TELEPHONE ENCOUNTER
From: Guille Velze  To: Eric Manriquez MD  Sent: 11/15/2021 11:35 AM EST  Subject: Sinus Infection    I'm coughing up wads of green phlegm again; could you possibly send in a script for the singulair (I think it was) to use in the navage? It worked so well for my . ..

## 2021-11-24 DIAGNOSIS — M79.7 FIBROMYALGIA: ICD-10-CM

## 2021-11-24 RX ORDER — DICLOFENAC SODIUM 75 MG/1
TABLET, DELAYED RELEASE ORAL
Qty: 180 TABLET | Refills: 1 | Status: SHIPPED | OUTPATIENT
Start: 2021-11-24 | End: 2022-03-21 | Stop reason: SDUPTHER

## 2021-11-24 RX ORDER — MODAFINIL 200 MG/1
TABLET ORAL
Qty: 60 TABLET | Refills: 0 | Status: SHIPPED | OUTPATIENT
Start: 2021-11-24 | End: 2022-01-02

## 2021-11-24 RX ORDER — TRAZODONE HYDROCHLORIDE 100 MG/1
TABLET ORAL
Qty: 360 TABLET | Refills: 1 | Status: SHIPPED | OUTPATIENT
Start: 2021-11-24 | End: 2022-04-07

## 2021-11-24 RX ORDER — DULOXETIN HYDROCHLORIDE 60 MG/1
CAPSULE, DELAYED RELEASE ORAL
Qty: 180 CAPSULE | Refills: 1 | Status: SHIPPED | OUTPATIENT
Start: 2021-11-24 | End: 2021-12-22 | Stop reason: ALTCHOICE

## 2021-11-30 ENCOUNTER — TELEPHONE (OUTPATIENT)
Dept: ENT CLINIC | Age: 61
End: 2021-11-30

## 2021-12-01 NOTE — TELEPHONE ENCOUNTER
Informed pt estimate cost of procedure according to the [facility] department pt does not have a co-pay. The claim will go to her primary insurance first than secondary, then the remaining payment she is responsible for.  The estimate cost of procedure is $93,413.46

## 2021-12-03 ENCOUNTER — TELEPHONE (OUTPATIENT)
Dept: ENT CLINIC | Age: 61
End: 2021-12-03

## 2021-12-06 ENCOUNTER — TELEPHONE (OUTPATIENT)
Dept: ENT CLINIC | Age: 61
End: 2021-12-06

## 2021-12-06 NOTE — TELEPHONE ENCOUNTER
From: Gabriele Jackson NP  To: Christopher Loja  Sent: 11/19/2021 2:16 PM EST  Subject: new provider    I see that you have an appointment with Dr. Jimmie Gomez as a new patient.  I wish you well

## 2021-12-08 ENCOUNTER — OFFICE VISIT (OUTPATIENT)
Dept: NEUROLOGY | Age: 61
End: 2021-12-08

## 2021-12-08 DIAGNOSIS — F41.8 ANXIETY ABOUT HEALTH: ICD-10-CM

## 2021-12-08 DIAGNOSIS — Z79.899 MEDICAL MARIJUANA USE: ICD-10-CM

## 2021-12-08 DIAGNOSIS — Z81.8 FAMILY HISTORY OF DEMENTIA: ICD-10-CM

## 2021-12-08 DIAGNOSIS — G31.84 MILD COGNITIVE IMPAIRMENT: Primary | ICD-10-CM

## 2021-12-08 DIAGNOSIS — R41.89 COGNITIVE DECLINE: ICD-10-CM

## 2021-12-08 DIAGNOSIS — R47.89 WORD FINDING DIFFICULTY: ICD-10-CM

## 2021-12-08 DIAGNOSIS — R41.3 SHORT-TERM MEMORY LOSS: ICD-10-CM

## 2021-12-08 DIAGNOSIS — Z91.51 HISTORY OF ATTEMPTED SUICIDE: ICD-10-CM

## 2021-12-08 DIAGNOSIS — G89.29 OTHER CHRONIC PAIN: ICD-10-CM

## 2021-12-08 DIAGNOSIS — G93.32 CFS (CHRONIC FATIGUE SYNDROME): ICD-10-CM

## 2021-12-08 DIAGNOSIS — F33.2 SEVERE EPISODE OF RECURRENT MAJOR DEPRESSIVE DISORDER, WITHOUT PSYCHOTIC FEATURES (HCC): ICD-10-CM

## 2021-12-08 DIAGNOSIS — Z87.898 HISTORY OF SEIZURES: ICD-10-CM

## 2021-12-08 DIAGNOSIS — M79.7 FIBROMYALGIA: ICD-10-CM

## 2021-12-08 PROCEDURE — 90791 PSYCH DIAGNOSTIC EVALUATION: CPT | Performed by: CLINICAL NEUROPSYCHOLOGIST
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
## 2021-12-13 RX ORDER — FLUTICASONE FUROATE AND VILANTEROL TRIFENATATE 100; 25 UG/1; UG/1
1 POWDER RESPIRATORY (INHALATION) DAILY
Qty: 60 EACH | Refills: 2 | Status: SHIPPED | OUTPATIENT
Start: 2021-12-13 | End: 2022-03-21 | Stop reason: SDUPTHER

## 2021-12-13 RX ORDER — MONTELUKAST SODIUM 10 MG/1
TABLET ORAL
Qty: 90 TABLET | Refills: 2 | Status: SHIPPED | OUTPATIENT
Start: 2021-12-13 | End: 2022-06-30 | Stop reason: SDUPTHER

## 2021-12-16 ENCOUNTER — TELEPHONE (OUTPATIENT)
Dept: ENT CLINIC | Age: 61
End: 2021-12-16

## 2021-12-16 ENCOUNTER — PATIENT MESSAGE (OUTPATIENT)
Dept: FAMILY MEDICINE CLINIC | Age: 61
End: 2021-12-16

## 2021-12-16 NOTE — TELEPHONE ENCOUNTER
From: Meliza Richards  To: Thomas Asif NP  Sent: 12/16/2021 10:48 AM EST  Subject: Script change    Im working with a nurse practitioner on my psychiatric meds (her specialty) through [facility] and she is taking me back down to 30 mg of the cymbalta. Shes saying that the cymbalta doesnt play well with other medications and really limits our choices. So, well take note of how I do on the new dose and take it from there. Ideally I can titer down completely so we can explore other options. Im also getting weekly counseling through [facility] and I have a really wonderful therapist. Mac Roach you need those records? Both cam and I also got our booster shots two weeks ago. There was something else I thought you should know and if it comes back to mind Ill drop you another line.     Happy holidays,  Kathy Peace

## 2021-12-17 RX ORDER — DULOXETIN HYDROCHLORIDE 30 MG/1
60 CAPSULE, DELAYED RELEASE ORAL DAILY
COMMUNITY
End: 2022-06-30 | Stop reason: ALTCHOICE

## 2021-12-22 ENCOUNTER — OFFICE VISIT (OUTPATIENT)
Dept: FAMILY MEDICINE CLINIC | Age: 61
End: 2021-12-22
Payer: COMMERCIAL

## 2021-12-22 VITALS
WEIGHT: 242 LBS | RESPIRATION RATE: 18 BRPM | DIASTOLIC BLOOD PRESSURE: 78 MMHG | TEMPERATURE: 98 F | OXYGEN SATURATION: 97 % | BODY MASS INDEX: 40.27 KG/M2 | HEART RATE: 82 BPM | SYSTOLIC BLOOD PRESSURE: 132 MMHG

## 2021-12-22 DIAGNOSIS — E55.9 VITAMIN D DEFICIENCY: ICD-10-CM

## 2021-12-22 DIAGNOSIS — F32.A DEPRESSION, UNSPECIFIED DEPRESSION TYPE: ICD-10-CM

## 2021-12-22 DIAGNOSIS — E03.9 ACQUIRED HYPOTHYROIDISM: ICD-10-CM

## 2021-12-22 DIAGNOSIS — E78.00 HYPERCHOLESTEROLEMIA: ICD-10-CM

## 2021-12-22 DIAGNOSIS — R51.9 NONINTRACTABLE HEADACHE, UNSPECIFIED CHRONICITY PATTERN, UNSPECIFIED HEADACHE TYPE: ICD-10-CM

## 2021-12-22 DIAGNOSIS — G93.32 CFS (CHRONIC FATIGUE SYNDROME): ICD-10-CM

## 2021-12-22 DIAGNOSIS — I10 HYPERTENSION, UNSPECIFIED TYPE: ICD-10-CM

## 2021-12-22 DIAGNOSIS — M19.90 ARTHRITIS: ICD-10-CM

## 2021-12-22 DIAGNOSIS — M79.7 FIBROMYALGIA: Primary | ICD-10-CM

## 2021-12-22 PROCEDURE — G8427 DOCREV CUR MEDS BY ELIG CLIN: HCPCS | Performed by: FAMILY MEDICINE

## 2021-12-22 PROCEDURE — G8752 SYS BP LESS 140: HCPCS | Performed by: FAMILY MEDICINE

## 2021-12-22 PROCEDURE — G9717 DOC PT DX DEP/BP F/U NT REQ: HCPCS | Performed by: FAMILY MEDICINE

## 2021-12-22 PROCEDURE — G8417 CALC BMI ABV UP PARAM F/U: HCPCS | Performed by: FAMILY MEDICINE

## 2021-12-22 PROCEDURE — 99203 OFFICE O/P NEW LOW 30 MIN: CPT | Performed by: FAMILY MEDICINE

## 2021-12-22 PROCEDURE — G8754 DIAS BP LESS 90: HCPCS | Performed by: FAMILY MEDICINE

## 2021-12-22 PROCEDURE — G9899 SCRN MAM PERF RSLTS DOC: HCPCS | Performed by: FAMILY MEDICINE

## 2021-12-22 PROCEDURE — 3017F COLORECTAL CA SCREEN DOC REV: CPT | Performed by: FAMILY MEDICINE

## 2021-12-22 RX ORDER — IBUPROFEN 800 MG/1
TABLET ORAL
COMMUNITY
End: 2022-04-14 | Stop reason: ALTCHOICE

## 2021-12-22 NOTE — PROGRESS NOTES
IDENTIFYING INFORMATION:      Armandoadilia Pollo LAUREN Valentin , 63 y.o., female  [facility]     [facility] Record Number: [id number]    E and M CODING:  Established      Patient Active Problem List   Diagnosis Code    Depression F32. A    Fibromyalgia M79.7    CFS (chronic fatigue syndrome) R53.82    Severe episode of recurrent major depressive disorder, without psychotic features ([facility] [address] F33.2    Acquired hypothyroidism E03.9    CLEO on CPAP G47.33, Z99.89    Hypercholesterolemia E78.00    Mild intermittent asthma J45.20    Severe obesity (BMI 35.0-39. 9) with comorbidity ([address] E66.01    Rosacea L71.9    Plantar fasciitis, bilateral M72.2    Anxiety F41.9    Dysphagia R13.10    Hashimoto's thyroiditis E06.3    Hypertensive disorder I10    Insomnia G47.00    Osteoarthritis M19.90    Plantar fasciitis M72.2    Seasonal allergic reaction J30.2    Acquired talipes planus, unspecified laterality M21.40    Anal spasm K59.4    Allergic rhinitis J30.9    Deviated septum J34.2    Chronic maxillary sinusitis J32.0           CHIEF COMPLAINT:   Chief Complaint   Patient presents with   [facility] Patient     Former PCP Vilma Armenta--interested in see you as you are an osteopathic provider    Cough    Fatigue     Chronic fatigue         HISTORY OF PRESENT ILLNESS:    Charlene Garcai is a 63 y.o. female . she comes in for as a new patient. Has chronic fatigue , thinks over 15 years with diagnosis of about 9 years of CFS. Has chronic sinuses and scheduled for surgery in January. Sleep-on 400 mg of trazodone and edible CBD gummies, up to 3 and still lays here for an hour. tried [place] and it did nothing. Says she couldn't sleep. Has had a sleep study and does have sleep apnea, uses a CPAP when she can fall asleep but has a lot of sinuses and coughing and hard to use it. Says it does help with the fatigue when she can use it.   Has had some major depressive episode, last 2 weeks has had 2.  Working with a psychiatric nurse and counselor. Does have pain everywhere, has been diagnosed with fmg. PAST MEDICAL HISTORY:     Past Medical History:   Diagnosis Date    Acquired talipes planus, unspecified laterality     Anemia     Anxiety     Bronchitis     Chronic pain     neck and back    Dysphagia     Hashimoto's thyroiditis     Headache     Hypercholesterolemia     Hypertensive disorder     Insomnia     Osteoarthritis     Plantar fasciitis     Seasonal allergic reaction        MEDICATIONS:     Current Outpatient Medications on File Prior to Visit   Medication Sig Dispense Refill    ibuprofen (MOTRIN) 800 mg tablet Take  by mouth.  DULoxetine (CYMBALTA) 30 mg capsule Take 30 mg by mouth daily.  montelukast (SINGULAIR) 10 mg tablet TAKE ONE TABLET BY MOUTH ONE TIME DAILY 90 Tablet 2    fluticasone furoate-vilanteroL (Breo Ellipta) 100-25 mcg/dose inhaler Take 1 Puff by inhalation daily. 60 Each 2    modafiniL (PROVIGIL) 200 mg tablet TAKE ONE TABLET BY MOUTH TWICE A DAY 60 Tablet 0    diclofenac EC (VOLTAREN) 75 mg EC tablet TAKE ONE TABLET BY MOUTH TWICE A  Tablet 1    traZODone (DESYREL) 100 mg tablet TAKE FOUR TABLETS BY MOUTH ONE TIME DAILY AT BEDTIME 360 Tablet 1    pregabalin (LYRICA) 100 mg capsule TAKE ONE CAPSULE BY MOUTH TWICE A  Capsule 0    atorvastatin (LIPITOR) 10 mg tablet TAKE ONE TABLET BY MOUTH ONE TIME DAILY 90 Tablet 1    losartan (COZAAR) 100 mg tablet TAKE ONE TABLET BY MOUTH ONE TIME DAILY 90 Tablet 2    levothyroxine (SYNTHROID) 75 mcg tablet TAKE ONE TABLET BY MOUTH ONE TIME DAILY 90 Tab 1    nystatin (MYCOSTATIN) 100,000 unit/mL suspension TAKE 3 TEASPOONS (15 ML) BY MOUTH 4 TIMES PER  mL 3    cetirizine (ZYRTEC) 10 mg tablet Take 10 mg by mouth daily.       DULoxetine (CYMBALTA) 60 mg capsule TAKE ONE CAPSULE BY MOUTH TWICE A  Capsule 1    doxycycline (ADOXA) 100 mg tablet TAKE ONE TABLET BY MOUTH TWICE A DAY (Patient not taking: Reported on 12/22/2021) 20 Tablet 0    estradiol-norethindrone (ACTIVELLA) 0.5-0.1 mg per tablet TAKE ONE TABLET BY MOUTH ONE TIME DAILY (Patient not taking: Reported on 12/22/2021) 84 Tablet 1    ethyl chloride 100 % spray Apply 1 mL to affected area daily. (Patient not taking: Reported on 12/22/2021) 100 mL 1    amoxicillin-clavulanate (AUGMENTIN) 875-125 mg per tablet Take 1 Tablet by mouth every twelve (12) hours. (Patient not taking: Reported on 12/22/2021) 20 Tablet 1    solriamfetoL (Sunosi) 75 mg tab Take 1 Tablet by mouth daily. Max Daily Amount: 1 Tablet. (Patient not taking: Reported on 12/22/2021) 90 Tablet 1    cholecalciferol (VITAMIN D3) (2,000 UNITS /50 MCG) cap capsule Take 1 capsule by mouth daily (Patient not taking: Reported on 12/22/2021)      cyanocobalamin 2,000 mcg TbER Take 1 tablet by mouth daily (Patient not taking: Reported on 12/22/2021)      fish oil-omega-3 fatty acids (Fish Oil) 340-1,000 mg capsule Take 2 tablets by mouth daily (Patient not taking: Reported on 12/22/2021)      vitamin B complex (B COMPLEX 1 PO) Take  by mouth. (Patient not taking: Reported on 6/16/2021)      AZO CRANBERRY (CRANBERRY-PROBIOTICS-VITAMIN C) 584-48-46 mg-mg-million tab Take  by mouth. (Patient not taking: Reported on 12/22/2021)       No current facility-administered medications on file prior to visit.        ALLERGIES:    Allergies   Allergen Reactions    Methylprednisolone Unknown (comments)    Silicone Rash    Penicillins Nausea and Vomiting         SOCIAL HISTORY:     Social History     Socioeconomic History    Marital status:    Tobacco Use    Smoking status: Never Smoker    Smokeless tobacco: Never Used   Vaping Use    Vaping Use: Some days    Substances: CBD   Substance and Sexual Activity    Alcohol use: Not Currently     Alcohol/week: 1.0 standard drink     Types: 1 Glasses of wine per week    Drug use: Yes     Types: Marijuana     Comment: medical    Sexual activity: Never         SURGICAL HISTORY:    Past Surgical History:   Procedure Laterality Date    HX GYN  2011    endometrioma removal        FAMILY HISTORY:    Family History   Problem Relation Age of Onset    COPD Mother     COPD Father     Heart Attack Father     Cancer Sister         skin    Diabetes Brother     Breast Cancer Paternal Grandmother          REVIEW OF SYSTEMS:    I personally collected this information from all available source present (patient/others in room and records available) -[email or web address]  Review of Systems   Constitutional: Positive for chills and malaise/fatigue. Negative for diaphoresis and fever. HENT: Positive for congestion, sinus pain and sore throat. Negative for tinnitus. Eyes: Positive for photophobia (with headache). Negative for blurred vision and double vision. Respiratory: Positive for cough. Negative for shortness of breath and wheezing. Cardiovascular: Negative for chest pain, palpitations, orthopnea, leg swelling and PND. Gastrointestinal: Positive for constipation. Negative for abdominal pain, blood in stool, diarrhea, nausea and vomiting. Genitourinary: Positive for frequency. Negative for dysuria and urgency. Musculoskeletal: Positive for back pain, falls (bumps into walls and trips over own feet. ), joint pain, myalgias and neck pain. Skin: Negative for itching and rash. Neurological: Positive for tingling, sensory change and headaches. Negative for dizziness. Left great finger has an inury as a child that she has a small area of the injury locatin that feels numb and burns. Endo/Heme/Allergies: Bruises/bleeds easily (bumps into things a lot. ). Psychiatric/Behavioral: Positive for depression. Negative for suicidal ideas. The patient is nervous/anxious and has insomnia.           PHYSICAL EXAMINATION:    Vital Signs:    Visit Vitals  /78 (BP 1 Location: Left upper arm, BP Patient Position: Sitting, BP Cuff Size: Adult)   Pulse 82   Temp 98 °F (36.7 °C) (Skin)   Resp 18   Wt 242 lb (109.8 kg)   SpO2 97%   BMI 40.27 kg/m²         Wt Readings from Last 3 Encounters:   12/22/21 242 lb (109.8 kg)   08/31/21 239 lb (108.4 kg)   06/16/21 239 lb 6.4 oz (108.6 kg)     BP Readings from Last 3 Encounters:   12/22/21 132/78   08/31/21 (!) 140/84   08/31/21 (!) 140/84         Physical Exam  Nursing note reviewed. Constitutional:       General: She is not in acute distress. Appearance: Normal appearance. She is obese. She is not ill-appearing or toxic-appearing. HENT:      Right Ear: Tympanic membrane, ear canal and external ear normal.      Left Ear: Tympanic membrane, ear canal and external ear normal.      Nose: No congestion or rhinorrhea. Mouth/Throat:      Mouth: Mucous membranes are moist.      Pharynx: Oropharynx is clear. Posterior oropharyngeal erythema present. No oropharyngeal exudate. Eyes:      General: No scleral icterus. Extraocular Movements: Extraocular movements intact. Conjunctiva/sclera: Conjunctivae normal.      Pupils: Pupils are equal, round, and reactive to light. Neck:      Vascular: No carotid bruit. Cardiovascular:      Rate and Rhythm: Normal rate and regular rhythm. Heart sounds: No murmur heard. No friction rub. No gallop. Pulmonary:      Effort: Pulmonary effort is normal.      Breath sounds: Normal breath sounds. No wheezing, rhonchi or rales. Abdominal:      General: Bowel sounds are normal. There is no distension. Palpations: Abdomen is soft. There is no mass. Tenderness: There is no abdominal tenderness. Musculoskeletal:         General: Tenderness present. No swelling or deformity. Cervical back: No rigidity. No muscular tenderness. Right lower leg: No edema. Left lower leg: No edema.       Comments: Tender points in the following areas;  Cervical I suboccipital bilaterally  Traps bilaterally  Sub acromial bursa bilaterally  Lateral epicondyles bilaterally  Sacroiliacs bilaterally  Knees bilaterally  Anterior chest wall bilaterally    Tight and soreness diffusely in thoracolumbar psine       Lymphadenopathy:      Cervical: No cervical adenopathy. Skin:     Coloration: Skin is not jaundiced. Findings: No erythema or rash. Neurological:      General: No focal deficit present. Mental Status: She is alert and oriented to person, place, and time. Mental status is at baseline. Coordination: Coordination normal.      Gait: Gait normal.      Deep Tendon Reflexes: Reflexes normal.   Psychiatric:         Mood and Affect: Mood normal.         Behavior: Behavior normal.         Thought Content: Thought content normal.         Judgment: Judgment normal.           ASSESSMENT/PLAN:       New patient visit today.  I think that she needs lab work again metabolic/autoimmune   She does see an ENT but may need some allergy testing/immunotherapy would certainly help if that is the case.  She does have sleep apnea syndrome. However, apparently, cannot wear the mask due to sinuses so hopefully the ENT surgery will help with that   I also need her to see a psychiatrist to continue the modafinil if appropriate and consider ADHD as a diagnosis. She says she has tried the stimulants before but not did well with them so I am not sure what else to do them presently.  I do not think she has fibromyalgia per se but certainly myofascial pain syndrome. Will leave on as a diagnosis for now until I can reconfirm consistent physical exam.   She also would benefit from counseling and she is getting that.  With headaches and as she said she stumbles a lot which I would call in ataxia we will check a brain MRI to see if there is any tumors or strokes or other abnormalities.  Follow-up and treat as indicated. ICD-10-CM ICD-9-CM    1. Fibromyalgia  M79.7 729.1 MAGNESIUM   2. Hypercholesterolemia  E78.00 272.0    3.  Hypertension, unspecified type  E41 898.2 METABOLIC PANEL, COMPREHENSIVE      URINALYSIS W/ RFLX MICROSCOPIC      LIPID PANEL   4. Vitamin D deficiency  E55.9 268.9 VITAMIN D, 25 HYDROXY   5. CFS (chronic fatigue syndrome)  R53.82 780.71 EBV NUCLEAR ANTIGEN, AB, IGG      EBV VIRAL CAPSID AB, IGM      CBC WITH AUTOMATED DIFF      THYROID CASCADE PROFILE      ESTROGENS, FRACTIONATED      LUTEINIZING HORMONE      MAGNESIUM      REFERRAL TO PSYCHIATRY   6. Acquired hypothyroidism  E03.9 244.9 THYROID CASCADE PROFILE   7. Nonintractable headache, unspecified chronicity pattern, unspecified headache type  R51.9 784.0 MAGNESIUM      MRI BRAIN WO CONT   8. Depression, unspecified depression type  F32. A 311    9. Arthritis  M19.90 716.90 KAIDEN BY MULTIPLEX FLOW IA, QL      RHEUMATOID FACTOR, QL      SED RATE (ESR)     Discussion (regarding today's visit with Guille Velez);  Marva Ma gave the patient a review of medications, treatment, testing such as labs, imagine, referrals and when to call regarding results and appointments.  Reminded patient to keep any and all appointments with specialists, labs, imaging.  Reminded patient to make sure we get copies of any specialists care, labs and imaging.  Reminded patient to call of come by the office if there are any concerns, questions , comments or problems.  The patient verbalized understanding of the care plan and all questions were answered to the patient's satisfaction prior to leaving the office.  Failure to comply with recommended testing could result in abnormal health consequences.  The patient was instructed to have yearly routine health maintenance including but not limited to age appropriate vaccines, testing, screening exams via the AVS.      The patient actively participated in medical decision making.     This date I spent  33 minutes reviewing chart, previous notes, tests and interviewing and examining patients answering questions providing follow up as well as ordering tests. FOLLOW UP:     Patient knows to keep any and all future visits scheduled unless told otherwise. Patient knows to call, come back if any concerns, questions, comments or problems arise. WILL FOLLOW UP A FEW DAYS AFTER MRI           Signed By: Kuldeep Villafana DO     December 22, 2021     TIME: 5:05 PM    This visit was reviewed and signed electronically. It was been completed with voice recognition software and hand typing. It may have syntax and spelling errors despite editing.

## 2021-12-22 NOTE — PATIENT INSTRUCTIONS
General Health and concerns:  HEART HEALTHY DIET:  A heart healthy diet is one that is low in cholesterol (less than 300 mg daily), fat (less than 80 g daily) . You should also minimize carbohydrates / sugars (less amounts of breads, pastas, potato and potato products and sugary foods/snacks, cookies, cakes, etc) . Try to eat whole wheat/multigrain breads and pastas and eat more vegetables. Cook with olive oil (or no oil) and grill, bake, broil or boil foods. Less red meat and more chicken , fish and lean cuts of beef (limited). [id number] calories per day is sufficient [id number] is acceptable for weight loss. EXERCISE:  You should do exercise 3-5 days per week (minimum) to include increasing your heart rate for 30 to 45 minutes. At least a pace of a brisk walk should do that. This build up your heart and lung endurance and muscles and helps many function of the body. OTHER:    IF your condition(s) do not improve, get worse and/or if any concerns arise, please call or come by the office. Routine Health maintenance: You need to get a yearly follow up/physical exam to review, discuss age and gender appropriate exams, labs, vaccines and screening tests. This includes cardiovascular health risk, cancer screens and other braden related topics. Medications-Take all medications as directed. Please do not stop unless you talk to your doctor or health care provider first. Report any problems immediately. PLEASE CHECK THE LIST FROM TODAY's VISIT and make SURE IT IS ACCURATE-Please bring any issues to our concern IMMEDIATELY!! Referrals: if you have been given a referral, please call the office if you do not hear from provider in one week. You may make the appointment yourself. Please keep all appointments with specialists and ask them to send their notes, thoughts, recommendations to us , as your PCP.     KEEP all upcoming appointments with our office UNLESS otherwise and specifically told not to. CHECK your diagnosis/problem list for today and that orders and prescriptions are what we discussed as well as MAKE sure all information is accurate and has been discussed to your satisfaction. PLEASE make sure all your questions have been answered and feel free to call or come back should any concerns arise. Imaging/Labs:  Be sure to get these images in a timely manner. IF your test must be scheduled, let us know if you need help getting this done and if you do not hear from that provider in a week , call us or them. BE SURE to call the office if you do not hear regarding the results in one week after the test is performed Image or lab). It is our intention to inform you of the results ALWAYS, even if normal you should get a notification (Call, portal message). PLEASE nubia if you do not get the results. PLEASE follow all recommendations and call/come in /ask questions if you do not understand of if problems develop after or in between visits. Failure to comply with recommended health care advise could result in serious health consequences. Thank you for choosing our practice and please let us know how we can help you feel better and stay well!

## 2021-12-22 NOTE — PROGRESS NOTES
Chief Complaint   Patient presents with   [facility] Patient     Former PCP Bryon Armenta--interested in see you as you are an osteopathic provider    Cough    Fatigue     Chronic fatigue     1. Have you been to the ER, urgent care clinic since your last visit? Hospitalized since your last visit? New patient    2. Have you seen or consulted any other health care providers outside of the [address] [place] [address] since your last visit? Include any pap smears or colon screening.  New patient  Visit Vitals  /78 (BP 1 Location: Left upper arm, BP Patient Position: Sitting, BP Cuff Size: Adult)   Pulse 82   Temp 98 °F (36.7 °C) (Skin)   Resp 18   Wt 242 lb (109.8 kg)   SpO2 97%   BMI 40.27 kg/m²

## 2021-12-23 ENCOUNTER — PATIENT MESSAGE (OUTPATIENT)
Dept: ENT CLINIC | Age: 61
End: 2021-12-23

## 2021-12-28 RX ORDER — BUDESONIDE 1 MG/2ML
1000 INHALANT ORAL DAILY
Qty: 60 ML | Refills: 3 | Status: SHIPPED | OUTPATIENT
Start: 2021-12-28 | End: 2022-03-21 | Stop reason: ALTCHOICE

## 2021-12-28 NOTE — TELEPHONE ENCOUNTER
From: Autry Gottron  To: Elijah Brumfield MD  Sent: 12/23/2021 5:01 PM EST  Subject: Sick    Im not quite sure what other options we have besides the antibiotics, but Andres started hacking out green phlegm again. Did both rounds back to back of the levoflaxin. The infection got better, sort of I guess, and Id stopped hacking up green phlegm, just clear and whitish. I saw the osteopath on Wednesday and he said my lungs sounded nice and clear. The cough had started getting croupy but the antis helped with that too. Had been starting to wonder if it could be pneumonia; the coughing is killing me. On a hmm hes ordering bloodwork already. Think I need to get tested for Covid while were at it. I dont know. You? Lol    Maybe we can do a very low dose of steroids? Or something similar? Thanks! On a ps please remind me; I think I had asked you about getting some of the singular ampules (of course I cant remember the right name) thingys to use in the [facility]?

## 2021-12-30 ENCOUNTER — OFFICE VISIT (OUTPATIENT)
Dept: FAMILY MEDICINE CLINIC | Age: 61
End: 2021-12-30
Payer: COMMERCIAL

## 2021-12-30 VITALS
HEART RATE: 82 BPM | WEIGHT: 236 LBS | RESPIRATION RATE: 16 BRPM | TEMPERATURE: 97.3 F | OXYGEN SATURATION: 97 % | DIASTOLIC BLOOD PRESSURE: 78 MMHG | SYSTOLIC BLOOD PRESSURE: 132 MMHG | HEIGHT: 65 IN | BODY MASS INDEX: 39.32 KG/M2

## 2021-12-30 DIAGNOSIS — J32.0 CHRONIC MAXILLARY SINUSITIS: ICD-10-CM

## 2021-12-30 DIAGNOSIS — Z99.89 OSA ON CPAP: ICD-10-CM

## 2021-12-30 DIAGNOSIS — Z00.00 ENCOUNTER FOR MEDICARE ANNUAL WELLNESS EXAM: Primary | ICD-10-CM

## 2021-12-30 DIAGNOSIS — F41.9 ANXIETY: ICD-10-CM

## 2021-12-30 DIAGNOSIS — E78.00 HYPERCHOLESTEROLEMIA: ICD-10-CM

## 2021-12-30 DIAGNOSIS — G47.33 OSA ON CPAP: ICD-10-CM

## 2021-12-30 DIAGNOSIS — J34.2 DEVIATED SEPTUM: ICD-10-CM

## 2021-12-30 DIAGNOSIS — I10 HYPERTENSION, UNSPECIFIED TYPE: ICD-10-CM

## 2021-12-30 PROCEDURE — G9717 DOC PT DX DEP/BP F/U NT REQ: HCPCS | Performed by: NURSE PRACTITIONER

## 2021-12-30 PROCEDURE — G0439 PPPS, SUBSEQ VISIT: HCPCS | Performed by: NURSE PRACTITIONER

## 2021-12-30 PROCEDURE — G8417 CALC BMI ABV UP PARAM F/U: HCPCS | Performed by: NURSE PRACTITIONER

## 2021-12-30 PROCEDURE — G8752 SYS BP LESS 140: HCPCS | Performed by: NURSE PRACTITIONER

## 2021-12-30 PROCEDURE — 3017F COLORECTAL CA SCREEN DOC REV: CPT | Performed by: NURSE PRACTITIONER

## 2021-12-30 PROCEDURE — G8754 DIAS BP LESS 90: HCPCS | Performed by: NURSE PRACTITIONER

## 2021-12-30 PROCEDURE — G9899 SCRN MAM PERF RSLTS DOC: HCPCS | Performed by: NURSE PRACTITIONER

## 2021-12-30 PROCEDURE — G8427 DOCREV CUR MEDS BY ELIG CLIN: HCPCS | Performed by: NURSE PRACTITIONER

## 2021-12-30 NOTE — PROGRESS NOTES
Chief Complaint   Patient presents with   [employer] Annual Wellness Visit     MEDICARE     1. Have you been to the ER, urgent care clinic since your last visit? Hospitalized since your last visit? No    2. Have you seen or consulted any other health care providers outside of the [address] [place] [address] since your last visit? Include any pap smears or colon screening. No   Visit Vitals  /78 (BP 1 Location: Left upper arm, BP Patient Position: Sitting, BP Cuff Size: Adult)   Pulse 82   Temp 97.3 °F (36.3 °C) (Temporal)   Resp 16   Ht 5' 5\" (1.651 m)   Wt 236 lb (107 kg)   SpO2 97%   BMI 39.27 kg/m²     3 most recent PHQ Screens 12/30/2021   Little interest or pleasure in doing things Nearly every day   Feeling down, depressed, irritable, or hopeless Nearly every day   Total Score PHQ 2 6   Trouble falling or staying asleep, or sleeping too much Nearly every day   Feeling tired or having little energy Nearly every day   Poor appetite, weight loss, or overeating Nearly every day   Feeling bad about yourself - or that you are a failure or have let yourself or your family down Nearly every day   Trouble concentrating on things such as school, work, reading, or watching TV Nearly every day   Moving or speaking so slowly that other people could have noticed; or the opposite being so fidgety that others notice Nearly every day   Thoughts of being better off dead, or hurting yourself in some way Nearly every day   PHQ 9 Score 27   How difficult have these problems made it for you to do your work, take care of your home and get along with others Very difficult     Fall Risk Assessment, last 12 mths 12/30/2021   Able to walk? Yes   Fall in past 12 months? 1   Do you feel unsteady? 1   Are you worried about falling 0   Fall with injury?  0

## 2021-12-31 DIAGNOSIS — M79.7 FIBROMYALGIA: ICD-10-CM

## 2022-01-01 ENCOUNTER — PATIENT MESSAGE (OUTPATIENT)
Dept: FAMILY MEDICINE CLINIC | Age: 62
End: 2022-01-01

## 2022-01-01 DIAGNOSIS — F41.9 ANXIETY: Primary | ICD-10-CM

## 2022-01-01 RX ORDER — HYDROXYZINE PAMOATE 50 MG/1
50 CAPSULE ORAL
Qty: 30 CAPSULE | Refills: 0 | Status: SHIPPED | OUTPATIENT
Start: 2022-01-01 | End: 2022-01-15

## 2022-01-01 NOTE — PROGRESS NOTES
On Call note:    Patient called in with anxiety. Currently weaning off of Cymbalta. Would like an as needed medication ordered. Hydroxyzine ordered.      Bashir Delacruz MD

## 2022-01-02 PROBLEM — Z00.00 ENCOUNTER FOR MEDICARE ANNUAL WELLNESS EXAM: Status: ACTIVE | Noted: 2022-01-02

## 2022-01-02 RX ORDER — MODAFINIL 200 MG/1
TABLET ORAL
Qty: 60 TABLET | Refills: 0 | Status: SHIPPED | OUTPATIENT
Start: 2022-01-02 | End: 2022-02-11

## 2022-01-03 NOTE — TELEPHONE ENCOUNTER
Med last refilled [id number] and patient has been seen in the last 6 months.   No inconsistencies noted in

## 2022-01-03 NOTE — PROGRESS NOTES
Medicare Wellness Exam:    Chief Complaint   Patient presents with    Annual Wellness Visit     MEDICARE     she is a 63y.o. year old female who presents for evaluation for their Medicare Wellness Visit. Patient has seen Dr. Ekta Pyle for a second opinion and he has asked for labs of his choosing to be drawn. Fall Screen is completed and assessed=yes  Depression Screen is completed and assessed=yes  Medication list reviewed and adjusted for accuracy=yes  Immunizations reviewed and updated=yes  Health/Preventative Screenings reviewed and updated=yes  ADL Functions reviewed=yes  See scanned medicare wellness documents for full details. Patient Active Problem List    Diagnosis    Encounter for Medicare annual wellness exam    Allergic rhinitis    Deviated septum    Chronic maxillary sinusitis    Anal spasm    Anxiety    Dysphagia    Hashimoto's thyroiditis    Hypertensive disorder    Insomnia    Osteoarthritis    Plantar fasciitis    Seasonal allergic reaction    Acquired talipes planus, unspecified laterality    Depression    Fibromyalgia    CFS (chronic fatigue syndrome)    Severe episode of recurrent major depressive disorder, without psychotic features [facility] [facility]    Acquired hypothyroidism    CLEO on CPAP    Hypercholesterolemia    Mild intermittent asthma    Severe obesity (BMI 35.0-39. 9) with comorbidity ([facility]    Rosacea    Plantar fasciitis, bilateral       Reviewed PmHx, RxHx, FmHx, SocHx, AllgHx and updated and dated in the chart. ROS   ROS per HPI and patient's active problem list    Objective:     Vitals:    12/30/21 1043   BP: 132/78   Pulse: 82   Resp: 16   Temp: 97.3 °F (36.3 °C)   TempSrc: Temporal   SpO2: 97%   Weight: 236 lb (107 kg)   Height: 5' 5\" (1.651 m)     Physical Exam  Vitals reviewed. Neurological:      Mental Status: She is oriented to person, place, and time.    Psychiatric:         Mood and Affect: Mood normal.         Behavior: Behavior normal. Thought Content: Thought content normal.         Judgment: Judgment normal.          Assessment/ Plan:   Diagnoses and all orders for this visit:    1. Encounter for Medicare annual wellness exam  We are making sure there her health screenings are performed in a timely fashion. Please refer to Nursing intake summary for details    2. Hypercholesterolemia  Obtaining updated lipid panel for trending and will make treatment decisions when I get the results    3. Anxiety  Patient if followed by [facility] NP    4. CLEO on CPAP  Patient is consistent with the use of her CPAP. Will refer back to sleep medicine with exacerbations    5. Chronic maxillary sinusitis  Patient is followed by ENT for this condition    6. Deviated septum  Patient is followed by ENT for this condition    7. Hypertension, unspecified type  BP is at goal today at 132/78. Will maintain patient on current regimen at present dosage           -Pain evaluation performed in office  -Cognitive Screen performed in office  -Depression Screen, Fall risks (by up and go test)  and ADL functionality were addressed  -Medication list updated and reviewed for any changes   -A comprehensive review of medical issues and a plan was formulated  -End of life planning was addressed with pt   -Health Screenings for preventions were addressed and a plan was formulated  -Shingles Vaccine was recommended  -Discussed with patient cancer risk factors and appropriate screenings for age  -Patient evaluated for colonoscopy and referred if needed per screeing criteria  -Labs from previous visits were discussed with patient   -Discussed with patient diet and exercise and formulated a plan as needed  -An Advanced care plan was developed with the patient.  -Alcohol screening performed and was negative    -  Follow-up and Dispositions    · Return in about 6 months (around 6/30/2022) for f/u of chronic conditions with fasting labs.          I have discussed the diagnosis with the patient and the intended plan as seen in the above orders. The patient understands and agrees with the plan. The patient has received an after-visit summary and questions were answered concerning future plans. Medication Side Effects and Warnings were discussed with patien  Patient Labs were reviewed and or requested  Patient Past Records were reviewed and or requested    There are no Patient Instructions on file for this visit.       Flaquita Swan, NP

## 2022-01-04 ENCOUNTER — PATIENT MESSAGE (OUTPATIENT)
Dept: FAMILY MEDICINE CLINIC | Age: 62
End: 2022-01-04

## 2022-01-04 LAB
25(OH)D3+25(OH)D2 SERPL-MCNC: 41.3 NG/ML (ref 30–100)
ALBUMIN SERPL-MCNC: 5.1 G/DL (ref 3.8–4.9)
ALBUMIN/GLOB SERPL: 2.1 {RATIO} (ref 1.2–2.2)
ALP SERPL-CCNC: 106 IU/L (ref 44–121)
ALT SERPL-CCNC: 32 IU/L (ref 0–32)
ANA SER QL: NEGATIVE
APPEARANCE UR: CLEAR
AST SERPL-CCNC: 24 IU/L (ref 0–40)
BACTERIA #/AREA URNS HPF: ABNORMAL /[HPF]
BASOPHILS # BLD AUTO: 0.1 X10E3/UL (ref 0–0.2)
BASOPHILS NFR BLD AUTO: 1 %
BILIRUB SERPL-MCNC: 0.5 MG/DL (ref 0–1.2)
BILIRUB UR QL STRIP: NEGATIVE
BUN SERPL-MCNC: 19 MG/DL (ref 6–24)
BUN/CREAT SERPL: 19 (ref 9–23)
CALCIUM SERPL-MCNC: 9.8 MG/DL (ref 8.7–10.2)
CASTS URNS QL MICRO: ABNORMAL /LPF
CHLORIDE SERPL-SCNC: 101 MMOL/L (ref 96–106)
CHOLEST SERPL-MCNC: 190 MG/DL (ref 100–199)
CO2 SERPL-SCNC: 25 MMOL/L (ref 20–29)
COLOR UR: YELLOW
CREAT SERPL-MCNC: 1.02 MG/DL (ref 0.57–1)
CRYSTALS URNS MICRO: ABNORMAL
EBV NA IGG SER IA-ACNC: 289 U/ML (ref 0–17.9)
EBV VCA IGM SER IA-ACNC: <36 U/ML (ref 0–35.9)
EOSINOPHIL # BLD AUTO: 0.2 X10E3/UL (ref 0–0.4)
EOSINOPHIL NFR BLD AUTO: 2 %
EPI CELLS #/AREA URNS HPF: >10 /HPF (ref 0–10)
ERYTHROCYTE [DISTWIDTH] IN BLOOD BY AUTOMATED COUNT: 13.6 % (ref 11.7–15.4)
ERYTHROCYTE [SEDIMENTATION RATE] IN BLOOD BY WESTERGREN METHOD: 2 MM/HR (ref 0–40)
ESTRADIOL SERPL-MCNC: 12.6 PG/ML
ESTRONE SERPL-MCNC: 106 PG/ML
GLOBULIN SER CALC-MCNC: 2.4 G/DL (ref 1.5–4.5)
GLUCOSE SERPL-MCNC: 93 MG/DL (ref 65–99)
GLUCOSE UR QL: NEGATIVE
HCT VFR BLD AUTO: 42.3 % (ref 34–46.6)
HDLC SERPL-MCNC: 48 MG/DL
HGB BLD-MCNC: 14.7 G/DL (ref 11.1–15.9)
HGB UR QL STRIP: NEGATIVE
IMM GRANULOCYTES # BLD AUTO: 0 X10E3/UL (ref 0–0.1)
IMM GRANULOCYTES NFR BLD AUTO: 0 %
KETONES UR QL STRIP: ABNORMAL
LDLC SERPL CALC-MCNC: 115 MG/DL (ref 0–99)
LEUKOCYTE ESTERASE UR QL STRIP: ABNORMAL
LH SERPL-ACNC: 46.4 MIU/ML
LYMPHOCYTES # BLD AUTO: 2 X10E3/UL (ref 0.7–3.1)
LYMPHOCYTES NFR BLD AUTO: 22 %
MAGNESIUM SERPL-MCNC: 2.5 MG/DL (ref 1.6–2.3)
MCH RBC QN AUTO: 30.4 PG (ref 26.6–33)
MCHC RBC AUTO-ENTMCNC: 34.8 G/DL (ref 31.5–35.7)
MCV RBC AUTO: 88 FL (ref 79–97)
MICRO URNS: ABNORMAL
MONOCYTES # BLD AUTO: 0.6 X10E3/UL (ref 0.1–0.9)
MONOCYTES NFR BLD AUTO: 6 %
NEUTROPHILS # BLD AUTO: 6.4 X10E3/UL (ref 1.4–7)
NEUTROPHILS NFR BLD AUTO: 69 %
NITRITE UR QL STRIP: NEGATIVE
PH UR STRIP: 5.5 [PH] (ref 5–7.5)
PLATELET # BLD AUTO: 269 X10E3/UL (ref 150–450)
POTASSIUM SERPL-SCNC: 4.6 MMOL/L (ref 3.5–5.2)
PROT SERPL-MCNC: 7.5 G/DL (ref 6–8.5)
PROT UR QL STRIP: ABNORMAL
RBC # BLD AUTO: 4.83 X10E6/UL (ref 3.77–5.28)
RBC #/AREA URNS HPF: ABNORMAL /HPF (ref 0–2)
RHEUMATOID FACT SERPL-ACNC: 12.2 IU/ML (ref 0–15)
SODIUM SERPL-SCNC: 140 MMOL/L (ref 134–144)
SP GR UR: >=1.03 (ref 1–1.03)
TRIGL SERPL-MCNC: 153 MG/DL (ref 0–149)
TSH SERPL DL<=0.005 MIU/L-ACNC: 3.32 UIU/ML (ref 0.45–4.5)
UNIDENT CRYS URNS QL MICRO: PRESENT
UROBILINOGEN UR STRIP-MCNC: 0.2 MG/DL (ref 0.2–1)
VLDLC SERPL CALC-MCNC: 27 MG/DL (ref 5–40)
WBC # BLD AUTO: 9.2 X10E3/UL (ref 3.4–10.8)
WBC #/AREA URNS HPF: ABNORMAL /HPF (ref 0–5)

## 2022-01-04 NOTE — PROGRESS NOTES
The Nancy-Barr virus is positive for infection of probably long-term. However, he does suggest that this could be related to chronic fatigue syndrome. The blood count is normal with no anemia or infection. The kidney function is essentially normal. The liver function is normal. Thyroid is normal. Hormones seem appropriate but could be in the premenopausal phase or very early menopause essentially. Urinalysis does not look infected. Looks like it might be more red blood cells and possible early kidney stones/crystals. Continue to drink plenty fluid and urinate at least every 1-2 hours. Vitamin D is normal. Sed rate and rheumatoid factor are normal. Magnesium is just a little above normal. Check your vitamins and see if they have extra magnesium. This should be rechecked in 4 to 6weeks to see if there is stabilizing or increasing. Also, I would see the rheumatologist for the possible chronic fatigue and fibromyalgia issues.

## 2022-01-07 ENCOUNTER — VIRTUAL VISIT (OUTPATIENT)
Dept: ENT CLINIC | Age: 62
End: 2022-01-07
Payer: COMMERCIAL

## 2022-01-07 DIAGNOSIS — J34.2 DNS (DEVIATED NASAL SEPTUM): ICD-10-CM

## 2022-01-07 DIAGNOSIS — J32.0 CHRONIC MAXILLARY SINUSITIS: ICD-10-CM

## 2022-01-07 DIAGNOSIS — J30.9 ALLERGIC RHINITIS, UNSPECIFIED SEASONALITY, UNSPECIFIED TRIGGER: Primary | ICD-10-CM

## 2022-01-07 PROCEDURE — 3017F COLORECTAL CA SCREEN DOC REV: CPT | Performed by: OTOLARYNGOLOGY

## 2022-01-07 PROCEDURE — G9899 SCRN MAM PERF RSLTS DOC: HCPCS | Performed by: OTOLARYNGOLOGY

## 2022-01-07 PROCEDURE — G8417 CALC BMI ABV UP PARAM F/U: HCPCS | Performed by: OTOLARYNGOLOGY

## 2022-01-07 PROCEDURE — G8427 DOCREV CUR MEDS BY ELIG CLIN: HCPCS | Performed by: OTOLARYNGOLOGY

## 2022-01-07 PROCEDURE — 99213 OFFICE O/P EST LOW 20 MIN: CPT | Performed by: OTOLARYNGOLOGY

## 2022-01-07 PROCEDURE — G8756 NO BP MEASURE DOC: HCPCS | Performed by: OTOLARYNGOLOGY

## 2022-01-07 PROCEDURE — G9717 DOC PT DX DEP/BP F/U NT REQ: HCPCS | Performed by: OTOLARYNGOLOGY

## 2022-01-07 RX ORDER — LEVOFLOXACIN 500 MG/1
500 TABLET, FILM COATED ORAL DAILY
Qty: 14 TABLET | Refills: 1 | Status: SHIPPED | OUTPATIENT
Start: 2022-01-07 | End: 2022-01-21

## 2022-01-07 RX ORDER — PREDNISONE 20 MG/1
20 TABLET ORAL DAILY
Qty: 5 TABLET | Refills: 0 | Status: SHIPPED | OUTPATIENT
Start: 2022-01-07 | End: 2022-01-12

## 2022-01-07 RX ORDER — HYDROCODONE BITARTRATE AND ACETAMINOPHEN 5; 325 MG/1; MG/1
1 TABLET ORAL
Qty: 20 TABLET | Refills: 0 | Status: SHIPPED | OUTPATIENT
Start: 2022-01-07 | End: 2022-01-12

## 2022-01-07 NOTE — PROGRESS NOTES
Subjective:    Luis A Chavez   63 y.o.   1960     Follow-up visit    Initial HPI  Location - nose, sinus    Quality - chronic sinus, allergies    Severity -  Moderate, severe    Duration - years    Timing - chronic    Context - pt with multiple allergy symptoms and complaints, has been on IT with Dr Rosalind Chase but had to change providers due to insurance changes; she has been on multiple PO and nasal sprays with minimal improvement; she feels that she has a sinus infection for past 2 years, multiple abx in past 1 year; no recent imaging    Modifying Features - worse with allergies    Associated symptoms/signs - cough, ear pain    Follow-up HPI  8/31/2021 - 8 months followup - pt had her allergy testing today - still c/o congestion, coughing; has not been able to use her CPAP; she is on flonase, c/o throat irritation    1/7/2022 - Pursuant to the emergency declaration under the [address] and the [address] authority and the [employer] General Act, a synchronous virtual visit is being conducted with the patient's full consent, to reduce risk of exposure to COVID-19 and to provide indicated medical evaluation and treatment. Verbal consent is obtained for a virtual appointment, and patient is aware that insurance will be billed and patient is responsible for any copay or coinsurance. Visit is performed via [employer] platform. Virtual visit today preop for septoplasty and maxillary antrostomy surgery January 17 at [address] [place] [address]. She has been suffering with respiratory tract infections past several weeks. She completed another round of antibiotics couple of weeks ago she is feeling better now. She is also doing sinus rinse and her nasal steroid spray. Review of Systems  Review of Systems   Constitutional: Negative for chills and fever. HENT: Positive for congestion, ear pain and sinus pain.  Negative for hearing loss, nosebleeds and tinnitus. Eyes: Negative for blurred vision and double vision. Respiratory: Positive for cough. Negative for sputum production and shortness of breath. Cardiovascular: Negative for chest pain and palpitations. Gastrointestinal: Negative for heartburn, nausea and vomiting. Musculoskeletal: Negative for joint pain and neck pain. Skin: Negative. Neurological: Positive for headaches. Negative for dizziness, speech change and weakness. Endo/Heme/Allergies: Positive for environmental allergies. Does not bruise/bleed easily. Psychiatric/Behavioral: Negative for memory loss. The patient does not have insomnia. Past Medical History:   Diagnosis Date    Acquired talipes planus, unspecified laterality     Anemia     Anxiety     Bronchitis     Chronic pain     neck and back    Dysphagia     Hashimoto's thyroiditis     Headache     Hypercholesterolemia     Hypertensive disorder     Insomnia     Osteoarthritis     Plantar fasciitis     Seasonal allergic reaction      Past Surgical History:   Procedure Laterality Date    HX GYN  2011    endometrioma removal      Family History   Problem Relation Age of Onset    COPD Mother    [facility] COPD Father     Heart Attack Father     Cancer Sister         skin    Diabetes Brother     Breast Cancer Paternal Grandmother      Social History     Tobacco Use    Smoking status: Never Smoker    Smokeless tobacco: Never Used   Substance Use Topics    Alcohol use: Not Currently     Alcohol/week: 1.0 standard drink     Types: 1 Glasses of wine per week      Prior to Admission medications    Medication Sig Start Date End Date Taking? Authorizing Provider   levoFLOXacin (LEVAQUIN) 500 mg tablet Take 1 Tablet by mouth daily for 14 days. Start on Jan 13th 1/7/22 1/21/22 Yes Shane Bynum MD   predniSONE (DELTASONE) 20 mg tablet Take 20 mg by mouth daily for 5 days.  Start on Jan 18th 1/7/22 1/12/22 Yes Shane Bynum MD   HYDROcodone-acetaminophen (Lorcet, HYDROcodone,) 5-325 mg per tablet Take 1 Tablet by mouth every four (4) hours as needed for Pain for up to 5 days. Max Daily Amount: 6 Tablets. Start on Jan 18th 1/7/22 1/12/22 Yes Chayito Vallejo MD   semaglutide (RYBELSUS) 3 mg tablet Take 1 Tablet by mouth Daily (before breakfast). 1/5/22   Arvel Miners, NP   modafiniL (PROVIGIL) 200 mg tablet TAKE ONE TABLET BY MOUTH TWICE A DAY 1/2/22   Arvel Miners, NP   hydrOXYzine pamoate (VISTARIL) 50 mg capsule Take 1 Capsule by mouth three (3) times daily as needed for Anxiety or Agitation for up to 14 days. 1/1/22 1/15/22  Muna Livingston MD   budesonide (PULMICORT) 1 mg/2 mL nbsp 2 mL by Nebulization route daily. 12/28/21   Chayito Vallejo MD   ibuprofen (MOTRIN) 800 mg tablet Take  by mouth. Provider, Historical   DULoxetine (CYMBALTA) 30 mg capsule Take 60 mg by mouth daily. Provider, Historical   montelukast (SINGULAIR) 10 mg tablet TAKE ONE TABLET BY MOUTH ONE TIME DAILY 12/13/21   Arvel Miners, NP   fluticasone furoate-vilanteroL (Breo Ellipta) 100-25 mcg/dose inhaler Take 1 Puff by inhalation daily. 12/13/21   Arvel Miners, NP   diclofenac EC (VOLTAREN) 75 mg EC tablet TAKE ONE TABLET BY MOUTH TWICE A DAY 11/24/21   Arvel Miners, NP   traZODone (DESYREL) 100 mg tablet TAKE FOUR TABLETS BY MOUTH ONE TIME DAILY AT BEDTIME 11/24/21   Arvel Miners, NP   pregabalin (LYRICA) 100 mg capsule TAKE ONE CAPSULE BY MOUTH TWICE A DAY 11/19/21   Arvel Miners, NP   atorvastatin (LIPITOR) 10 mg tablet TAKE ONE TABLET BY MOUTH ONE TIME DAILY 10/3/21   Arvel Miners, NP   losartan (COZAAR) 100 mg tablet TAKE ONE TABLET BY MOUTH ONE TIME DAILY 10/3/21   Arvel Miners, NP   levothyroxine (SYNTHROID) 75 mcg tablet TAKE ONE TABLET BY MOUTH ONE TIME DAILY 3/19/21   Arvel Miners, NP   nystatin (MYCOSTATIN) 100,000 unit/mL suspension TAKE 3 TEASPOONS (15 ML) BY MOUTH 4 TIMES PER DAY 2/1/21   Angel Mann NP   cetirizine (ZYRTEC) 10 mg tablet Take 10 mg by mouth daily.     Provider, Historical        Allergies   Allergen Reactions    Methylprednisolone Unknown (comments)    Silicone Rash    Penicillins Nausea and Vomiting         Objective: There were no vitals taken for this visit. Physical Exam  Constitutional:       General: She is not in acute distress. Appearance: She is obese. HENT:      Head: Normocephalic. Right Ear: Hearing normal.      Left Ear: Hearing normal.      Nose: Congestion present. Eyes:      Conjunctiva/sclera: Conjunctivae normal.      Pupils: Pupils are equal, round, and reactive to light. Pulmonary:      Effort: Pulmonary effort is normal. No respiratory distress. Breath sounds: No stridor. Musculoskeletal:         General: Normal range of motion. Skin:     Coloration: Skin is not jaundiced. Findings: No bruising. Neurological:      General: No focal deficit present. Mental Status: She is alert and oriented to person, place, and time. Mental status is at baseline. Psychiatric:         Mood and Affect: Mood normal.         Behavior: Behavior normal.         Thought Content: Thought content normal.         Assessment/Plan:     Encounter Diagnoses   Name Primary?  Allergic rhinitis, unspecified seasonality, unspecified trigger Yes    DNS (deviated nasal septum)     Chronic maxillary sinusitis      I have reviewed her prior records from allergists, testing  She does have grass, weed, heller and mouse allergy still  She would benefit from immunotherapy. I reviewed her CT from outside ENT - she has severe DNS to left, narrowed OMUs  We discussed septo/mini-ESS and she wants to proceed. She is [facility] for January 17 at [facility]. Discussed risks/benefits of septoplasty/endoscopic sinus surgery procedure with patient. Risks include bleeding, infection, septum perforation, continued nasal blockage, orbital/intracranial complications. Questions answered. We will start antibiotics preop on January 13.   I will also send course of prednisone and her postop pain medication to start after surgery.       Orders Placed This Encounter    levoFLOXacin (LEVAQUIN) 500 mg tablet    predniSONE (DELTASONE) 20 mg tablet    HYDROcodone-acetaminophen (Lorcet, HYDROcodone,) 5-325 mg per tablet

## 2022-01-07 NOTE — H&P (VIEW-ONLY)
Subjective:    Yumiko Sanders   63 y.o.   1960     Follow-up visit    Initial HPI  Location - nose, sinus    Quality - chronic sinus, allergies    Severity -  Moderate, severe    Duration - years    Timing - chronic    Context - pt with multiple allergy symptoms and complaints, has been on IT with Dr Natalya Jones but had to change providers due to insurance changes; she has been on multiple PO and nasal sprays with minimal improvement; she feels that she has a sinus infection for past 2 years, multiple abx in past 1 year; no recent imaging    Modifying Features - worse with allergies    Associated symptoms/signs - cough, ear pain    Follow-up HPI  8/31/2021 - 8 months followup - pt had her allergy testing today - still c/o congestion, coughing; has not been able to use her CPAP; she is on flonase, c/o throat irritation    1/7/2022 - Pursuant to the emergency declaration under the [address] and the [address] authority and the [employer] Act, a synchronous virtual visit is being conducted with the patient's full consent, to reduce risk of exposure to COVID-19 and to provide indicated medical evaluation and treatment. Verbal consent is obtained for a virtual appointment, and patient is aware that insurance will be billed and patient is responsible for any copay or coinsurance. Visit is performed via [employer] platform. Virtual visit today preop for septoplasty and maxillary antrostomy surgery January 17 at [address] [place] [address]. She has been suffering with respiratory tract infections past several weeks. She completed another round of antibiotics couple of weeks ago she is feeling better now. She is also doing sinus rinse and her nasal steroid spray. Review of Systems  Review of Systems   Constitutional: Negative for chills and fever. HENT: Positive for congestion, ear pain and sinus pain.  Negative for hearing loss, nosebleeds and tinnitus. Eyes: Negative for blurred vision and double vision. Respiratory: Positive for cough. Negative for sputum production and shortness of breath. Cardiovascular: Negative for chest pain and palpitations. Gastrointestinal: Negative for heartburn, nausea and vomiting. Musculoskeletal: Negative for joint pain and neck pain. Skin: Negative. Neurological: Positive for headaches. Negative for dizziness, speech change and weakness. Endo/Heme/Allergies: Positive for environmental allergies. Does not bruise/bleed easily. Psychiatric/Behavioral: Negative for memory loss. The patient does not have insomnia. Past Medical History:   Diagnosis Date    Acquired talipes planus, unspecified laterality     Anemia     Anxiety     Bronchitis     Chronic pain     neck and back    Dysphagia     Hashimoto's thyroiditis     Headache     Hypercholesterolemia     Hypertensive disorder     Insomnia     Osteoarthritis     Plantar fasciitis     Seasonal allergic reaction      Past Surgical History:   Procedure Laterality Date    HX GYN  2011    endometrioma removal      Family History   Problem Relation Age of Onset    COPD Mother    Belinda Blank COPD Father     Heart Attack Father     Cancer Sister         skin    Diabetes Brother     Breast Cancer Paternal Grandmother      Social History     Tobacco Use    Smoking status: Never Smoker    Smokeless tobacco: Never Used   Substance Use Topics    Alcohol use: Not Currently     Alcohol/week: 1.0 standard drink     Types: 1 Glasses of wine per week      Prior to Admission medications    Medication Sig Start Date End Date Taking? Authorizing Provider   levoFLOXacin (LEVAQUIN) 500 mg tablet Take 1 Tablet by mouth daily for 14 days. Start on Jan 13th 1/7/22 1/21/22 Yes Magi Camacho MD   predniSONE (DELTASONE) 20 mg tablet Take 20 mg by mouth daily for 5 days.  Start on Jan 18th 1/7/22 1/12/22 Yes Magi Camacho MD   HYDROcodone-acetaminophen (Lorcet, HYDROcodone,) 5-325 mg per tablet Take 1 Tablet by mouth every four (4) hours as needed for Pain for up to 5 days. Max Daily Amount: 6 Tablets. Start on Jan 18th 1/7/22 1/12/22 Yes Le Maciel MD   semaglutide (RYBELSUS) 3 mg tablet Take 1 Tablet by mouth Daily (before breakfast). 1/5/22   Earma Maritos, NP   modafiniL (PROVIGIL) 200 mg tablet TAKE ONE TABLET BY MOUTH TWICE A DAY 1/2/22   Earma Maritos, NP   hydrOXYzine pamoate (VISTARIL) 50 mg capsule Take 1 Capsule by mouth three (3) times daily as needed for Anxiety or Agitation for up to 14 days. 1/1/22 1/15/22  Emory Genao MD   budesonide (PULMICORT) 1 mg/2 mL nbsp 2 mL by Nebulization route daily. 12/28/21   Le Maciel MD   ibuprofen (MOTRIN) 800 mg tablet Take  by mouth. Provider, Historical   DULoxetine (CYMBALTA) 30 mg capsule Take 60 mg by mouth daily. Provider, Historical   montelukast (SINGULAIR) 10 mg tablet TAKE ONE TABLET BY MOUTH ONE TIME DAILY 12/13/21   Earma Sings, NP   fluticasone furoate-vilanteroL (Breo Ellipta) 100-25 mcg/dose inhaler Take 1 Puff by inhalation daily. 12/13/21   Earma Sings, NP   diclofenac EC (VOLTAREN) 75 mg EC tablet TAKE ONE TABLET BY MOUTH TWICE A DAY 11/24/21   Earma Sings, NP   traZODone (DESYREL) 100 mg tablet TAKE FOUR TABLETS BY MOUTH ONE TIME DAILY AT BEDTIME 11/24/21   Earma Sings, NP   pregabalin (LYRICA) 100 mg capsule TAKE ONE CAPSULE BY MOUTH TWICE A DAY 11/19/21   Earma Sings, NP   atorvastatin (LIPITOR) 10 mg tablet TAKE ONE TABLET BY MOUTH ONE TIME DAILY 10/3/21   Earma Sings, NP   losartan (COZAAR) 100 mg tablet TAKE ONE TABLET BY MOUTH ONE TIME DAILY 10/3/21   Earma Sings, NP   levothyroxine (SYNTHROID) 75 mcg tablet TAKE ONE TABLET BY MOUTH ONE TIME DAILY 3/19/21   Earma Sings, NP   nystatin (MYCOSTATIN) 100,000 unit/mL suspension TAKE 3 TEASPOONS (15 ML) BY MOUTH 4 TIMES PER DAY 2/1/21   Earma Sings, NP   cetirizine (ZYRTEC) 10 mg tablet Take 10 mg by mouth daily.     Provider, Historical        Allergies   Allergen Reactions    Methylprednisolone Unknown (comments)    Silicone Rash    Penicillins Nausea and Vomiting         Objective: There were no vitals taken for this visit. Physical Exam  Constitutional:       General: She is not in acute distress. Appearance: She is obese. HENT:      Head: Normocephalic. Right Ear: Hearing normal.      Left Ear: Hearing normal.      Nose: Congestion present. Eyes:      Conjunctiva/sclera: Conjunctivae normal.      Pupils: Pupils are equal, round, and reactive to light. Pulmonary:      Effort: Pulmonary effort is normal. No respiratory distress. Breath sounds: No stridor. Musculoskeletal:         General: Normal range of motion. Skin:     Coloration: Skin is not jaundiced. Findings: No bruising. Neurological:      General: No focal deficit present. Mental Status: She is alert and oriented to person, place, and time. Mental status is at baseline. Psychiatric:         Mood and Affect: Mood normal.         Behavior: Behavior normal.         Thought Content: Thought content normal.         Assessment/Plan:     Encounter Diagnoses   Name Primary?  Allergic rhinitis, unspecified seasonality, unspecified trigger Yes    DNS (deviated nasal septum)     Chronic maxillary sinusitis      I have reviewed her prior records from allergists, testing  She does have grass, weed, heller and mouse allergy still  She would benefit from immunotherapy. I reviewed her CT from outside ENT - she has severe DNS to left, narrowed OMUs  We discussed septo/mini-ESS and she wants to proceed. She is scheduled for January 17 at [address] [place] [address]. Discussed risks/benefits of septoplasty/endoscopic sinus surgery procedure with patient. Risks include bleeding, infection, septum perforation, continued nasal blockage, orbital/intracranial complications. Questions answered. We will start antibiotics preop on January 13.   I will also send course of prednisone and her postop pain medication to start after surgery.       Orders Placed This Encounter    levoFLOXacin (LEVAQUIN) 500 mg tablet    predniSONE (DELTASONE) 20 mg tablet    HYDROcodone-acetaminophen (Lorcet, HYDROcodone,) 5-325 mg per tablet

## 2022-01-12 ENCOUNTER — PATIENT MESSAGE (OUTPATIENT)
Dept: FAMILY MEDICINE CLINIC | Age: 62
End: 2022-01-12

## 2022-01-12 DIAGNOSIS — E66.01 SEVERE OBESITY (BMI 35.0-39.9) WITH COMORBIDITY (HCC): Primary | ICD-10-CM

## 2022-01-12 NOTE — TELEPHONE ENCOUNTER
From: Ksenia Comings  To:  Caleb Pride NP  Sent: 1/12/2022 12:53 PM EST  Subject: couple of things    I just wanted to follow up on a couple of things  A bariatric referral  A prescription for oxygen (?)  LDN (a link below but the dose is apparently 4.5 mg  [email or web address]   I feel like Im forgetting something but I will just have to wait until it comes to [facility]

## 2022-01-13 DIAGNOSIS — E66.01 SEVERE OBESITY (BMI 35.0-39.9) WITH COMORBIDITY (HCC): ICD-10-CM

## 2022-01-13 NOTE — TELEPHONE ENCOUNTER
Please call her and give her the bariatric referral.  The bariatric referral is[facility] [address] [phone]  Can you fax for me please

## 2022-01-14 ENCOUNTER — PATIENT MESSAGE (OUTPATIENT)
Dept: FAMILY MEDICINE CLINIC | Age: 62
End: 2022-01-14

## 2022-01-14 ENCOUNTER — HOSPITAL ENCOUNTER (OUTPATIENT)
Dept: PREADMISSION TESTING | Age: 62
Discharge: HOME | End: 2022-01-14
Payer: COMMERCIAL

## 2022-01-14 DIAGNOSIS — M79.7 FIBROMYALGIA: Primary | ICD-10-CM

## 2022-01-14 LAB — SARS-COV-2, COV2: NORMAL

## 2022-01-14 PROCEDURE — U0005 INFEC AGEN DETEC AMPLI PROBE: HCPCS

## 2022-01-14 NOTE — TELEPHONE ENCOUNTER
From: Wendelin Closs  To: Norma Smoker, NP  Sent: 1/14/2022 2:32 PM EST  Subject: Melo Chapa been hearing good things about the [facility]. Is that something you think could work with my current regimen? Apparently it helps with the pain, fatigue and brain fog. (And oh glory something to help with the fatigue!!!!!). Its only one of 3 drugs (I think) prescribed specifically for the fibro    Also I ran across an old office note and we had tried Adderol? I think I would like to try a very low dose of it and see if it helps with the adhd and the fatigue. Thanks!

## 2022-01-16 LAB
SARS-COV-2, XPLCVT: NOT DETECTED
SOURCE, COVRS: NORMAL

## 2022-01-17 ENCOUNTER — TELEPHONE (OUTPATIENT)
Dept: ENT CLINIC | Age: 62
End: 2022-01-17

## 2022-01-17 ENCOUNTER — HOSPITAL ENCOUNTER (OUTPATIENT)
Age: 62
Setting detail: OUTPATIENT SURGERY
Discharge: HOME | End: 2022-01-17
Attending: OTOLARYNGOLOGY | Admitting: OTOLARYNGOLOGY
Payer: COMMERCIAL

## 2022-01-17 ENCOUNTER — ANESTHESIA (OUTPATIENT)
Dept: SURGERY | Age: 62
End: 2022-01-17
Payer: COMMERCIAL

## 2022-01-17 ENCOUNTER — ANESTHESIA EVENT (OUTPATIENT)
Dept: SURGERY | Age: 62
End: 2022-01-17
Payer: COMMERCIAL

## 2022-01-17 VITALS
TEMPERATURE: 98.3 F | SYSTOLIC BLOOD PRESSURE: 144 MMHG | WEIGHT: 244.27 LBS | DIASTOLIC BLOOD PRESSURE: 88 MMHG | RESPIRATION RATE: 15 BRPM | HEIGHT: 65 IN | HEART RATE: 77 BPM | BODY MASS INDEX: 40.7 KG/M2 | OXYGEN SATURATION: 95 %

## 2022-01-17 LAB — HCG UR QL: NEGATIVE

## 2022-01-17 PROCEDURE — 77030040922 HC BLNKT HYPOTHRM STRY -A

## 2022-01-17 PROCEDURE — 74011250636 HC RX REV CODE- 250/636: Performed by: NURSE ANESTHETIST, CERTIFIED REGISTERED

## 2022-01-17 PROCEDURE — 30520 REPAIR OF NASAL SEPTUM: CPT | Performed by: OTOLARYNGOLOGY

## 2022-01-17 PROCEDURE — 74011250636 HC RX REV CODE- 250/636: Performed by: OTOLARYNGOLOGY

## 2022-01-17 PROCEDURE — 74011000250 HC RX REV CODE- 250: Performed by: OTOLARYNGOLOGY

## 2022-01-17 PROCEDURE — 31256 EXPLORATION MAXILLARY SINUS: CPT | Performed by: OTOLARYNGOLOGY

## 2022-01-17 PROCEDURE — 77030002888 HC SUT CHRMC J&J -A: Performed by: OTOLARYNGOLOGY

## 2022-01-17 PROCEDURE — 77030021668 HC NEB PREFIL KT VYRM -A

## 2022-01-17 PROCEDURE — 2709999900 HC NON-CHARGEABLE SUPPLY: Performed by: OTOLARYNGOLOGY

## 2022-01-17 PROCEDURE — 74011000258 HC RX REV CODE- 258: Performed by: NURSE ANESTHETIST, CERTIFIED REGISTERED

## 2022-01-17 PROCEDURE — 76210000046 HC AMBSU PH II REC FIRST 0.5 HR: Performed by: OTOLARYNGOLOGY

## 2022-01-17 PROCEDURE — 74011250637 HC RX REV CODE- 250/637: Performed by: OTOLARYNGOLOGY

## 2022-01-17 PROCEDURE — 74011000250 HC RX REV CODE- 250: Performed by: NURSE ANESTHETIST, CERTIFIED REGISTERED

## 2022-01-17 PROCEDURE — 77030006907 HC BLD SNUS SHV MEDT -C: Performed by: OTOLARYNGOLOGY

## 2022-01-17 PROCEDURE — 77030028681 HC DRSG NSL ABSRB NASOPORE STRY -C: Performed by: OTOLARYNGOLOGY

## 2022-01-17 PROCEDURE — 77030008684 HC TU ET CUF COVD -B: Performed by: ANESTHESIOLOGY

## 2022-01-17 PROCEDURE — 76210000035 HC AMBSU PH I REC 1 TO 1.5 HR: Performed by: OTOLARYNGOLOGY

## 2022-01-17 PROCEDURE — 81025 URINE PREGNANCY TEST: CPT

## 2022-01-17 PROCEDURE — 31254 NSL/SINS NDSC W/PRTL ETHMDCT: CPT | Performed by: OTOLARYNGOLOGY

## 2022-01-17 PROCEDURE — 76060000062 HC AMB SURG ANES 1 TO 1.5 HR: Performed by: OTOLARYNGOLOGY

## 2022-01-17 PROCEDURE — 76030000001 HC AMB SURG OR TIME 1 TO 1.5: Performed by: OTOLARYNGOLOGY

## 2022-01-17 PROCEDURE — 77030040361 HC SLV COMPR DVT MDII -B

## 2022-01-17 PROCEDURE — 77030026438 HC STYL ET INTUB CARD -A: Performed by: ANESTHESIOLOGY

## 2022-01-17 RX ORDER — ROCURONIUM BROMIDE 10 MG/ML
INJECTION, SOLUTION INTRAVENOUS AS NEEDED
Status: DISCONTINUED | OUTPATIENT
Start: 2022-01-17 | End: 2022-01-17 | Stop reason: HOSPADM

## 2022-01-17 RX ORDER — EPHEDRINE SULFATE/0.9% NACL/PF 50 MG/5 ML
SYRINGE (ML) INTRAVENOUS AS NEEDED
Status: DISCONTINUED | OUTPATIENT
Start: 2022-01-17 | End: 2022-01-17 | Stop reason: HOSPADM

## 2022-01-17 RX ORDER — NALOXONE HYDROCHLORIDE 0.4 MG/ML
0.4 INJECTION, SOLUTION INTRAMUSCULAR; INTRAVENOUS; SUBCUTANEOUS AS NEEDED
Status: DISCONTINUED | OUTPATIENT
Start: 2022-01-17 | End: 2022-01-17 | Stop reason: HOSPADM

## 2022-01-17 RX ORDER — PROPOFOL 10 MG/ML
INJECTION, EMULSION INTRAVENOUS AS NEEDED
Status: DISCONTINUED | OUTPATIENT
Start: 2022-01-17 | End: 2022-01-17 | Stop reason: HOSPADM

## 2022-01-17 RX ORDER — DEXAMETHASONE SODIUM PHOSPHATE 100 MG/10ML
INJECTION INTRAMUSCULAR; INTRAVENOUS AS NEEDED
Status: DISCONTINUED | OUTPATIENT
Start: 2022-01-17 | End: 2022-01-17 | Stop reason: HOSPADM

## 2022-01-17 RX ORDER — MIDAZOLAM HYDROCHLORIDE 1 MG/ML
INJECTION, SOLUTION INTRAMUSCULAR; INTRAVENOUS AS NEEDED
Status: DISCONTINUED | OUTPATIENT
Start: 2022-01-17 | End: 2022-01-17 | Stop reason: HOSPADM

## 2022-01-17 RX ORDER — OXYMETAZOLINE HCL 0.05 %
SPRAY, NON-AEROSOL (ML) NASAL AS NEEDED
Status: DISCONTINUED | OUTPATIENT
Start: 2022-01-17 | End: 2022-01-17 | Stop reason: HOSPADM

## 2022-01-17 RX ORDER — CLINDAMYCIN PHOSPHATE 900 MG/50ML
900 INJECTION, SOLUTION INTRAVENOUS ONCE
Status: DISCONTINUED | OUTPATIENT
Start: 2022-01-17 | End: 2022-01-17 | Stop reason: HOSPADM

## 2022-01-17 RX ORDER — OXYCODONE HYDROCHLORIDE 5 MG/1
5 TABLET ORAL
Status: DISCONTINUED | OUTPATIENT
Start: 2022-01-17 | End: 2022-01-17 | Stop reason: HOSPADM

## 2022-01-17 RX ORDER — SODIUM CHLORIDE, SODIUM LACTATE, POTASSIUM CHLORIDE, CALCIUM CHLORIDE 600; 310; 30; 20 MG/100ML; MG/100ML; MG/100ML; MG/100ML
125 INJECTION, SOLUTION INTRAVENOUS CONTINUOUS
Status: DISCONTINUED | OUTPATIENT
Start: 2022-01-17 | End: 2022-01-17 | Stop reason: HOSPADM

## 2022-01-17 RX ORDER — FENTANYL CITRATE 50 UG/ML
INJECTION, SOLUTION INTRAMUSCULAR; INTRAVENOUS AS NEEDED
Status: DISCONTINUED | OUTPATIENT
Start: 2022-01-17 | End: 2022-01-17 | Stop reason: HOSPADM

## 2022-01-17 RX ORDER — LIDOCAINE HYDROCHLORIDE AND EPINEPHRINE 10; 10 MG/ML; UG/ML
INJECTION, SOLUTION INFILTRATION; PERINEURAL AS NEEDED
Status: DISCONTINUED | OUTPATIENT
Start: 2022-01-17 | End: 2022-01-17 | Stop reason: HOSPADM

## 2022-01-17 RX ORDER — ONDANSETRON 2 MG/ML
4 INJECTION INTRAMUSCULAR; INTRAVENOUS
Status: DISCONTINUED | OUTPATIENT
Start: 2022-01-17 | End: 2022-01-17 | Stop reason: HOSPADM

## 2022-01-17 RX ORDER — DIPHENHYDRAMINE HYDROCHLORIDE 50 MG/ML
12.5 INJECTION, SOLUTION INTRAMUSCULAR; INTRAVENOUS
Status: DISCONTINUED | OUTPATIENT
Start: 2022-01-17 | End: 2022-01-17 | Stop reason: HOSPADM

## 2022-01-17 RX ORDER — ONDANSETRON 2 MG/ML
INJECTION INTRAMUSCULAR; INTRAVENOUS AS NEEDED
Status: DISCONTINUED | OUTPATIENT
Start: 2022-01-17 | End: 2022-01-17 | Stop reason: HOSPADM

## 2022-01-17 RX ORDER — ACETAMINOPHEN 325 MG/1
650 TABLET ORAL
Status: DISCONTINUED | OUTPATIENT
Start: 2022-01-17 | End: 2022-01-17 | Stop reason: HOSPADM

## 2022-01-17 RX ORDER — LIDOCAINE HYDROCHLORIDE 20 MG/ML
INJECTION, SOLUTION EPIDURAL; INFILTRATION; INTRACAUDAL; PERINEURAL AS NEEDED
Status: DISCONTINUED | OUTPATIENT
Start: 2022-01-17 | End: 2022-01-17 | Stop reason: HOSPADM

## 2022-01-17 RX ADMIN — SODIUM CHLORIDE, POTASSIUM CHLORIDE, SODIUM LACTATE AND CALCIUM CHLORIDE 125 ML/HR: 600; 310; 30; 20 INJECTION, SOLUTION INTRAVENOUS at 07:04

## 2022-01-17 RX ADMIN — MIDAZOLAM HYDROCHLORIDE 2 MG: 1 INJECTION, SOLUTION INTRAMUSCULAR; INTRAVENOUS at 09:24

## 2022-01-17 RX ADMIN — FENTANYL CITRATE 50 MCG: 50 INJECTION INTRAMUSCULAR; INTRAVENOUS at 09:24

## 2022-01-17 RX ADMIN — SODIUM CHLORIDE 600 MG: 9 INJECTION, SOLUTION INTRAVENOUS at 08:35

## 2022-01-17 RX ADMIN — DEXAMETHASONE SODIUM PHOSPHATE 10 MG: 10 INJECTION INTRAMUSCULAR; INTRAVENOUS at 08:03

## 2022-01-17 RX ADMIN — FENTANYL CITRATE 50 MCG: 50 INJECTION INTRAMUSCULAR; INTRAVENOUS at 08:35

## 2022-01-17 RX ADMIN — FENTANYL CITRATE 150 MCG: 50 INJECTION INTRAMUSCULAR; INTRAVENOUS at 07:58

## 2022-01-17 RX ADMIN — Medication 10 MG: at 08:30

## 2022-01-17 RX ADMIN — SODIUM CHLORIDE, POTASSIUM CHLORIDE, SODIUM LACTATE AND CALCIUM CHLORIDE: 600; 310; 30; 20 INJECTION, SOLUTION INTRAVENOUS at 09:06

## 2022-01-17 RX ADMIN — OXYCODONE 5 MG: 5 TABLET ORAL at 10:17

## 2022-01-17 RX ADMIN — ONDANSETRON HYDROCHLORIDE 4 MG: 2 SOLUTION INTRAMUSCULAR; INTRAVENOUS at 09:05

## 2022-01-17 RX ADMIN — ROCURONIUM BROMIDE 50 MG: 10 INJECTION INTRAVENOUS at 08:00

## 2022-01-17 RX ADMIN — MIDAZOLAM HYDROCHLORIDE 3 MG: 1 INJECTION, SOLUTION INTRAMUSCULAR; INTRAVENOUS at 07:53

## 2022-01-17 RX ADMIN — Medication 10 MG: at 08:11

## 2022-01-17 RX ADMIN — LIDOCAINE HYDROCHLORIDE 100 MG: 20 INJECTION, SOLUTION INTRAVENOUS at 07:58

## 2022-01-17 RX ADMIN — Medication 10 MG: at 08:49

## 2022-01-17 RX ADMIN — PROPOFOL 200 MG: 10 INJECTION, EMULSION INTRAVENOUS at 08:00

## 2022-01-17 NOTE — ANESTHESIA PREPROCEDURE EVALUATION
Relevant Problems   RESPIRATORY SYSTEM   (+) Mild intermittent asthma   (+) CLEO on CPAP      NEUROLOGY   (+) Depression   (+) Severe episode of recurrent major depressive disorder, without psychotic features ([address]      CARDIOVASCULAR   (+) Hypertensive disorder      ENDOCRINE   (+) Acquired hypothyroidism   (+) Severe obesity (BMI 35.0-39. 9) with comorbidity ([facility] [address]       Anesthetic History   No history of anesthetic complications            Review of Systems / Medical History  Patient summary reviewed and pertinent labs reviewed    Pulmonary        Sleep apnea           Neuro/Psych         Psychiatric history    Comments: Fibromyalgia Cardiovascular    Hypertension                   GI/Hepatic/Renal  Within defined limits              Endo/Other      Hypothyroidism  Obesity     Other Findings            Physical Exam    Airway  Mallampati: II  TM Distance: < 4 cm    Mouth opening: Normal     Cardiovascular    Rhythm: regular  Rate: normal         Dental  No notable dental hx       Pulmonary  Breath sounds clear to auscultation               Abdominal  GI exam deferred       Other Findings            Anesthetic Plan    ASA: 2  Anesthesia type: general          Induction: Intravenous  Anesthetic plan and risks discussed with: Patient

## 2022-01-17 NOTE — TELEPHONE ENCOUNTER
Nicol with Insurance Verification for [facility] called requesting the CPT code for this pt surgery.      Please call her at [phone]

## 2022-01-17 NOTE — DISCHARGE INSTRUCTIONS
SEE DR Chirinos's PRE-PRINTED INSTRUCTIONS - provided in the office  ([facility])    ___________________________________________        DISCHARGE SUMMARY from Your Nurse    PATIENT INSTRUCTIONS:    AFTER ANESTHESIA & SEDATION, and WHILE TAKING PAIN MEDICINE  After general anesthesia / intravenous sedation and the 24 hours following, and/or while taking prescription Opiates:  · Limit your activities  · Do not drive and operate hazardous machinery until you have been of all narcotics and sedatives for over 24 hours  · Do not make important personal or business decisions  · Do  not drink alcoholic beverages  · If you have not urinated within 8 hours after discharge, please contact your surgeon on call. SIGNS OF INFECTION, THINGS TO REPORT TO YOUR DOCTOR  Report the following Signs of Infection or General Problems after surgery to your surgeon:  · Excessive pain, swelling, redness, drainage, pus or odor of or around the surgical area  · Fever/ temperature over 101; Temperature over 100 if on medications (chemotherapy or medicines which affect your ability to fight infections)  · Nausea and vomiting lasting longer than 4 hours or if unable to take medications  · Any signs of decreased circulation or nerve impairment to extremity: change in color, persistent  numbness, tingling, coldness or increase pain  · If you have any questions. GOOD HELP TO FIGHT AN INFECTION  Here are a few tip to help reduce the chance of getting an infection after surgery:   Wash Your Hands   Good handwashing is the most important thing you and your caregiver can do.  Wash before and after caring for any wounds. Dry your hand with a clean towel.  Wash with soap and water for at least 20 seconds. A TIP: sing the \"Happy Birthday\" song through one time while washing to help with the timing.  Use a hand  in between washings.      Shower   When your surgeon says it is OK to take a shower, use a new bar of antibacterial soap (if that is what you use, and keep that bar of soap ONLY for your use), or antibacterial body wash.  Use a clean wash cloth or sponge when you bathe.  Dry off with a clean towel  after every bath - be careful around any wounds, skin staples, sutures or surgical glue over/on wounds.  Do not enter swimming pools, hot tubs, lakes, rivers and/or ocean until wounds are healed and your doctor/surgeon says it is OK.  Use Clean Sheets   Sleep on freshly laundered sheets after your surgery.  Keep the surgery site covered with a clean, dry bandage (if instructed to do so). If the bandage becomes soiled, reapply a new, dry, clean bandage.  Do not allow pets to sleep with you while your wound is healing.  Lifestyle Modification and Controlling Your Blood Sugar   Smoking slows wound healing. Stop smoking and limit exposure to second-hand smoke.  High blood sugar slows wound healing. Eat a well-balanced diet to provide proper nutrition while healing   Monitor your blood sugar (if you are a diabetic) and take your medications as you are suppose to so you can control you blood sugar after surgery. COUGH AND DEEP BREATHE  Breathing deep and coughing are very important exercises to do after surgery. Deep breathing and coughing open the little air tubes and air sacks in your lungs. You take deep breaths every day. You may not even notice - it is just something you do when you sigh or yawn. It is a natural exercise you do to keep these air passages open. After surgery, take deep breaths and cough, on purpose. Coughing and deep breathing help prevent bronchitis and pneumonia after surgery. If you had chest or belly surgery, use a pillow as a \"hug rd\" and hold it tightly to your chest or belly when you cough. DIRECTIONS:  1. Take 10 to 15 slow deep breaths every hour while awake. 2. Breathe in deeply, and hold it for 2 seconds.   3. Exhale slowly through puckered lips, like blowing up a balloon. 4. After every 4th or 5th deep breath, hug your pillow to your chest or belly and give a hard, deep cough. Yes, it will probably hurt if you had abdominal surgery. But doing this exercise is very important part of healing after surgery. Take your pain medicine to help you do this exercise without too much pain. ANKLE PUMPS    Ankle pumps increase the circulation of oxygenated blood to your lower extremities and decrease your risk for circulation problems such as blood clots. They also stretch the muscles, tendons and ligaments in your foot and ankle, and prevent joint contracture in the ankle and foot, especially after surgeries on the legs. It is important to do ankle pump exercises regularly after surgery because immobility increases your risk for developing a blood clot. Your doctor may also have you take an Aspirin for the next few days as well. If your doctor did not ask you to take an Aspirin, consult with him before starting Aspirin therapy on your own. The exercise is quite simple. · Slowly point your foot forward, feeling the muscles on the top of your lower leg stretch, and hold this position for 5 seconds. · Next, pull your foot back toward you as far as possible, stretching the calf muscles, and hold that position for 5 seconds. · Repeat with the other foot. · Perform 10 repetitions every hour while awake for both ankles if possible (down and then up with the foot once is one repetition). You should feel gentle stretching of the muscles in your lower leg when doing this exercise. If you feel pain, or your range of motion is limited, don't push too hard. Only go the limit your joint and muscles will let you go. If you have increasing pain, progressively worsening leg warmth or swelling, STOP the exercise and call your doctor. Other Wound Care information:  [x] No additional recommendations. Below is information on the medication(s) your doctor is prescribing for you: The maximum daily dose of acetaminophen was discussed with the patient. She was encouraged not to exceed 3,000 mg of acetaminophen during a 24 hour period and was asked to keep in mind that acetaminophen can also be found in many over-the-counter cold medications as well as narcotics that may be given for pain. The patient expresses understanding of these issues and questions were answered. 4 THINGS ABOUT PAIN MEDICINE I ALWAYS TALK ABOUT:  There are 4 side effects I always talk about for pain medications. 1. They make you sleepy and drowsy. Do not drive a car or operate machines while taking pain medication. Do not make any major decisions. Take a nap. Relax. Let your body recover from the affects of anesthesia and surgery. 2. Some people have quite a problem with itching and. ..  3. Nausea and/or vomiting. These are mention together because they are a related genetic issue; while some people experience these problems, others do not. These are expected and know side effects. Itching is caused by histamine release - practically all opiate medications can cause this. An over-the-counter anti-histamine can help. Over-the-counter Benadryl® (the generic drug name is diphenhydramine) can help, but may cause increased drowsiness which can be intensified by pain medications. Over-the-counter Claritin® (the generic drug name is loratadine) or Zyrtec® (the generic drug name is cetirizine) may be effective without as much drowsiness as with the Benadryl/diphenhydramine. If you have nausea, like the itching, practically all opioids can cause this. Hopefully your surgeon may have given you some medicine for nausea.   If your surgeon did not give you anti-nausea medications, and you are experiencing nausea/vomiting that prevent you from drinking clear liquids, CALL HIM/HER and request them, especially if these issues seem to get worse after you leave the hospital.    4. Last but not least is the problem of constipation (not barb able to have a bowel movement - poop.)  All pain medicine can slow down the movement of food through the gut. The slower it goes, the worse it can be. This only adds insult to the injury of surgery. And if you had tummy surgery, like having your gall bladder removed or a hernia repair, YOU DO NOT WANT THIS PROBLEM. There are 4 things I recommend. · Drink lots of fluids. For healthy people with no heart problems, this means at least 64 ounces of liquids or more per day. For example, a Big Gulp® from [employer] is 32 ounces. So you need to drink at least 2  Big Gulp®'s of fluids every day. If you have heart problems you may not be able to do this. Talk to your doctor about what you should do to prevent constipation. · Drinking fruit juice like apple, pear, or prune juice gives you extra \"BANG\" for your beverage. These drinks are high in natural fiber. If you are a diabetic, drink sugar-free fluids with fiber additives (see next 2 points.)  Avoid drinking extra fruit juice unless this is a regular part of your diet plan. · Eat extra fresh fruits and vegetables. · Add extra fiber-products. Fiber products like Metamucil®, Citrucel®, Miralax® or Benefiber® can help. These products are over-the-counter and you do not need a prescription from your doctor. If you have followed these recommendations and still have some difficulty having a good poop, take and over-the-counter stimulant like Dulcolax® (biscodyl)  or Senokot® (senna concentrate). These may help get things moving. [facility] MEDICATION AND   SIDE EFFECT GUIDE    The [facility] MEDICATION AND SIDE EFFECT GUIDE was provided to the PATIENT AND CARE PROVIDER.   Information provided includes instruction about drug purpose and common side effects for the following medications: · none      Medication information added to discharge record on January 17, 2022 at 9:51 AM.      Some information we wish all of our patients to be familiar with and General Information for Healthy Lifestyle choices:    · Make a list of your current medications with your Primary Care Provider. · Update this list whenever your medications are discontinued, doses are changed, or new medications (including over-the-counter products like ibuprofen, vitamins, or herbal remedies) are added. · Carry medication information at all times in case of emergency situations      No smoking / No tobacco products / Avoid exposure to second hand smoke    Surgeon General's Warning:  Quitting smoking now greatly reduces serious risk to your health. Obesity, smoking, and sedentary lifestyle greatly increases your risk for illness. A healthy diet, regular physical exercise & weight monitoring are important for maintaining a healthy lifestyle. A Note About Congestive Heart Failure: You may be retaining fluid if you have a history of heart failure or if you experience any of the following symptoms:      · Weight gain of 3 pounds or more overnight or 5 pounds in a week  · Increased swelling in our hands or feet  · Shortness of breath while lying flat in bed      Please call your doctor as soon as you notice any of these symptoms; do not wait until your next office visit. A Note About Strokes:  Recognize signs and symptoms of STROKE. The simple mnemonic, F.A.S.T., can help you remember signs of a stroke and what to do if you suspect a stroke is occuring to you or someone you are with:    F - Face looks uneven  A - Arms unable to move, or move evenly  S - Speech is slurred or non-existent  T - Time - CALL 911 as soon as signs and symptoms begin - DO NOT go to bed or wait to see if you get better - TIME IS BRAIN. Warning Signs of HEART ATTACK   Call 911 if you have these symptoms:     Chest discomfort.  Most heart attacks involve discomfort in the center of the chest that lasts more than a few minutes, or that goes away and comes back. It can feel like uncomfortable pressure, squeezing, fullness, or pain.  Discomfort in other areas of the upper body. Symptoms can include pain or discomfort in one or both arms, the back, neck, jaw, or stomach.  Shortness of breath with or without chest discomfort.  Other signs may include breaking out in a cold sweat, nausea, or lightheadedness. Don't wait more than five minutes to call 911 - MINUTES MATTER! Fast action can save your life. Calling 911 is almost always the fastest way to get lifesaving treatment. Emergency Medical Services staff can begin treatment when they arrive -- up to an hour sooner than if someone gets to the hospital by car. Learning About Coronavirus [phone])  Coronavirus [phone]): Overview  What is coronavirus (COVID-19)? The coronavirus disease (COVID-19) is caused by a virus. It is an illness that was first found in [place], [place], in December 2019. It has since spread worldwide. The virus can cause fever, cough, and trouble breathing. In severe cases, it can cause pneumonia and make it hard to breathe without help. It can cause death. Coronaviruses are a large group of viruses. They cause the common cold. They also cause more serious illnesses like Middle East respiratory syndrome (MERS) and severe acute respiratory syndrome (SARS). COVID-19 is caused by a novel coronavirus. That means it's a new type that has not been seen in people before. This virus spreads person-to-person through droplets from coughing and sneezing. It can also spread when you are close to someone who is infected. And it can spread when you touch something that has the virus on it, such as a doorknob or a tabletop. What can you do to protect yourself from coronavirus (COVID-19)?   The best way to protect yourself from getting sick is to:  · Avoid areas where there is an outbreak. · Avoid contact with people who may be infected. · Wash your hands often with soap or alcohol-based hand sanitizers. · Avoid crowds and try to stay at least 6 feet away from other people. · Wash your hands often, especially after you cough or sneeze. Use soap and water, and scrub for at least 20 seconds. If soap and water aren't available, use an alcohol-based hand . · Avoid touching your mouth, nose, and eyes. What can you do to avoid spreading the virus to others? To help avoid spreading the virus to others:  · Cover your mouth with a tissue when you cough or sneeze. Then throw the tissue in the trash. · Use a disinfectant to clean things that you touch often. · Stay home if you are sick or have been exposed to the virus. Don't go to school, work, or public areas. And don't use public transportation. · If you are sick:  ? Leave your home only if you need to get medical care. But call the doctor's office first so they know you're coming. And wear a face mask, if you have one.  ? If you have a face mask, wear it whenever you're around other people. It can help stop the spread of the virus when you cough or sneeze. ? Clean and disinfect your home every day. Use household  and disinfectant wipes or sprays. Take special care to clean things that you grab with your hands. These include doorknobs, remote controls, phones, and handles on your refrigerator and microwave. And don't forget countertops, tabletops, bathrooms, and computer keyboards. When to call for help  Call 911 anytime you think you may need emergency care. For example, call if:  · You have severe trouble breathing. (You can't talk at all.)  · You have constant chest pain or pressure. · You are severely dizzy or lightheaded. · You are confused or can't think clearly. · Your face and lips have a blue color. · You pass out (lose consciousness) or are very hard to wake up.   Call your doctor now if you develop symptoms such as:  · Shortness of breath. · Fever. · Cough. If you need to get care, call ahead to the doctor's office for instructions before you go. Make sure you wear a face mask, if you have one, to prevent exposing other people to the virus. Where can you get the latest information? The following health organizations are tracking and studying this virus. Their websites contain the most up-to-date information. Jason Cummings also learn what to do if you think you may have been exposed to the virus. · U.S. Centers for Disease Control and Prevention (CDC): The CDC provides updated news about the disease and travel advice. The website also tells you how to prevent the spread of infection. www.cdc.gov  · World Health Organization [facility]): WHO offers information about the virus outbreaks. WHO also has travel advice. www.who.int  Current as of: April 1, 2020               Content Version: 12.4  © 7067-6127 [employer]. Care instructions adapted under license by your healthcare professional. If you have questions about a medical condition or this instruction, always ask your healthcare professional. [address] any warranty or liability for your use of this information. AT THE COMPLETION OF DISCHARGE INSTRUCTION REVIEW, WE VERIFY:  The discharge information has been reviewed with the patient and caregiver. Questions have been asked and answered meeting patient and caregiver expectations. The patient and caregiver verbalized understanding.     Your discharge nurse was Julita Saucedo RN-BC       Board Certified - Pain Management      CONTENTS FOUND IN YOUR DISCHARGE ENVELOPE:  [x]     Surgeon and Hospital Discharge Instructions  [x]     [facility] Surgical [facility] Care Provider Card  []     Medication & Side Effect Guide            (your newly prescribed medications have been marked/highlighted showing the most common side effects from the classes of drugs on your prescriptions)  []     Medication Prescription(s) x 0 ( [] These have been sent electronically to your pharmacy by your surgeon,   - OR -       your surgeon has already provided these to you during a previous/pre-op office visit)  []     Pain block and/or block with On-Q Catheter from Anesthesia Service (information included in your instructions above)        []    [facility] Education Information  []     Physical Therapy Prescription  []     Follow-up Appointment Cards  []     Surgery-related Pictures/Media  []     Medical device information sheets/pamphlets from their    []     School/work excuse note. []     /parent work excuse note. The following personal items collected during your admission for safe keeping are returned to you:     Dental Appliance: Dental Appliances: None  Vi arabella: Visual Aid: None  Hearing Aid:    Jewelry: Jewelry: None  Clothing: Clothing:  (street clothes to 9+)  Other Valuables:  Other Valuables: None  Valuables sent to safe:

## 2022-01-17 NOTE — INTERVAL H&P NOTE
Update History & Physical    H&P update    Patient examined at the bedside preoperatively. Heart -regular rate and rhythm, S1/S2  Lungs -clear to auscultation bilaterally    No other changes to prior H&P. Procedure again discussed in detail, risks and benefits explained, postoperative considerations discussed. All questions answered.       Electronically signed by Troy Dickson MD on 1/17/2022 at 7:44 AM

## 2022-01-17 NOTE — OP NOTES
Operative Note    Patient: Colton Mari  YOB: 1960  MRN: [id number]    Date of Procedure: 1/17/2022     Pre-Op Diagnosis: Deviated Nasal Septum, Chronic Maxillary and ethmoidal sinusitis    Post-Op Diagnosis: Same as preoperative diagnosis. Procedure(s):  SEPTOPLASTY ENDOSCOPIC SINUS SURGERY WITH BILATERAL MAXILLARY ANTROSTOMY AND ANTERIOR ETHMOIDECTOMY    Surgeon(s):  Adilene Mcgrath MD    Surgical Assistant: None    Anesthesia: General     Estimated Blood Loss (mL):  Minimal    Complications: None    Specimens: * No specimens in log *     Implants: * No implants in log *    Drains: * No LDAs found *    Findings: Severe DNS to the left. Indications: 68-year-old female presents with 4+ sinus infections annually, chronic nasal congestion despite medical management. CT scan has revealed severe deviated septum toward the left side and narrow ostiomeatal complex with mucosal edema maxillary and ethmoids. Risks and benefits of sinus surgery discussed with the patient and she elects to proceed. Detailed Description of Procedure:     Patient was brought to the operating room placed supine on the table. General endotracheal anesthesia was obtained and a timeout was performed. Patient was positioned and draped in the appropriate fashion for nasal surgery. A total of 10 mL of 1% lidocaine with 1-100,000 parts epinephrine was infiltrated into the nasal septal mucosa bilaterally. The nose was then packed bilaterally with oxymetazoline neuro patties. After several minutes the pledgets were removed and intranasal examination revealed right inferior turbinate hypertrophy and mid septum deviation toward the left side abutting the left inferior turbinate. I made a hemitransfixion incision in the right nostril along the caudal septum  and then dissected the mucoperichondrial flap using Chancellor elevator exposing the right side.   Mucoperichondrial flap was then elevated contralaterally helping to expose the leftward deviation. Transcartilaginous incision was made just anterior to the area of leftward deviation. There was at least 2 cm of caudal septum preserved. Using a combination of freer elevator, swivel knife, and pituitary forceps the primarily cartilaginous deviation toward the left side was resected. Intranasal examination reveals significant improvement in the bilateral nasal airway. I closed the incision with interrupted 4-0 chromic and then utilized a quilting suture technique to reapproximate the mucosal flaps in the midline. Inferior turbinates were outfractured with a Hopkins elevator. The nose was irrigated and suctioned and oxymetazoline pledgets were placed. We then transition to the endoscopic portion of the case. We began first with 0 degree endoscope. The pledgets were removed and I gently medialized both middle turbinates with the Marshall elevator and injected an additional 6 mL of local anesthetic into the standard injection sites at the root of the middle turbinate and the uncinate mucosa. Oxymetazoline pledgets were replaced in the middle meatus for several minutes. After several minutes I turned attention first to the left side. Pledgets were removed and the middle turbinate was lightly medialized. A backbiter was used to dissect into the hiatus semilunaris and the uncinectomy was performed and completed with the shaver. This opened up the left maxillary antrostomy which was widened with a shaver. We incorporated the natural ostium. Mary Berg was then gently used to infracture the ethmoid bulla and utilizing the shaver and straight suction and curettes we performed a anterior ethmoidectomy. Curette was used to gently dissect along the skull base and along the lamina papyracea. Frontal recess was cleared of edematous mucosa using the angled Blakesley forcep. The left dissection was completed and oxymetazoline pledget is placed.     Similar dissection was performed on the right side creating the uncinectomy and widening it with the shaver. Once the antrostomy was completed the anterior ethmoidectomy was done as well with the same instrumentation. Care was taken along the skull base and the lamina. Once the frontal recess was cleared we packed the right side with oxymetazoline pledgets. After several minutes for hemostasis, bilateral nasal passages were copiously irrigated and suctioned. I then placed Nasapore dissolvable packing bilaterally in the middle meatus. Procedure was completed. Patient was awoken extubated brought to recovery in satisfactory condition.       Electronically Signed by Brandin Holder MD on 1/17/2022 at 9:26 AM

## 2022-01-17 NOTE — PERIOP NOTES
PACU IN REPORT FROM ANESTHESIA    Verbal report received from   Mai Ely   [] MD/DO-Anesthesiologist    [x] CRNA   [] with student    CHOICE ANESTHESIA:  [x] GENERAL  [] TIVA  [] MAC  [] LOCAL  [] REGIONAL  [] SPINAL   [] EPIDURAL   **Note the anesthesia record for medications given intraoperatively. **           [] E.R.A.S. PROTOCOL    SURGICAL PROCEDURE: Procedure(s) (LRB):  SEPTOPLASTY ENDOSCOPIC SINUS SURGERY WITH BILATERAL MAXILLARY ANTROSTOMY AND ANTERIOR ETHMOIDECTOMY (N/A)     SURGEON: Chayito Vallejo MD.    Brief Initial Visual Assessment:    Patient Age: [] Infant(1-12mo)      []Pediatric(1-13yrs)    [] Adolescent(13-18yrs)    [x] Adult(18-65yrs)      []Geriatric Adult(>65yrs). Patient    [] Alert           []Calm & Cooperative      [] Anxious  Appearance: [] Drowsy      [x] Sedated      [x] Unresponsive     Oriented x  0            Airway:     [x] Patent                          [] Obstructs easily/Obstructed on arrival   [] \"Difficult Airway\" report by Anesthesia                        [] Airway improved with head/airway repositioning                       Airway Adjuncts Present: [] Oral Airway    [] Nasal Trumpet    [] ETT    [] LMA            Respiratory  [x] Even   [] Labored   [] Shallow   [] Tachypnea   [] Bradypnea  Pattern:    [x] Non-Labored  [] VENT and/or respiratory assistance     being provided. Skin:     [x] Pink [] Dusky    [] Pale        [x] Warm    [] Hot [] Cool       [] Cold   [x]Dry [] Moist [] Diaphoretic     Membranes:  [x] Pink [] Pale       [x] Moist [] Dry     [] Crusty     Pain:   [x] No Acute Discomfort. 0  /10 Scale [] Verbal Numeric   [] Moderate Discomfort.     [] V.A.S. [] Acute Discomfort. [x] A.N.V.    [] Chronic-Issue Related Discomfort.   [] F.L.A.C.C. Note E-MAR for medications administered.   []Faces, Fermin/Baker    Note assessments documented in flowsheets; any assessment variants to be found in comments or narrative perioperative nurse notes.        Post-anesthesia care now assumed by [facility] BSN, RN-BC

## 2022-01-17 NOTE — PERIOP NOTES
POST ANESTHESIA CARE    DISCHARGE / TRANSFER NOTE  Francisco J Lombardi was:    [x] discharged        via   [x] Wheelchair          to [x] Private Vehicle     [] transferred   [] Carried   [] Taxi / Vehicle \"for Hire\"  [] Walk out  [] Ambulance / Medical Transportation   [] Stretcher  [] Hospital room _**_          [] Bed      Patient was escorted by:      [x] Nurse   [] Volunteer [] Transporter / Technician  [] Parent      [] Spouse / Family /      Patient verbalized     [x] appreciation and was very pleased with care received   [] frustration with care received       throughout their stay. Patient was discharged in     [x] pleasant mood  [] sad mood  [] mad mood . Pain at discharge/transfer was      3  /10. Discharge, medication and follow-up instructions were verbalized as understood prior to discharge  (if applicable for same-day procedures being discharged.)    All personal belongings have been returned to patient, and patient/family verbally confirm receiving belongings as all present.

## 2022-01-17 NOTE — ANESTHESIA POSTPROCEDURE EVALUATION
Procedure(s):  SEPTOPLASTY ENDOSCOPIC SINUS SURGERY WITH BILATERAL MAXILLARY ANTROSTOMY AND ANTERIOR ETHMOIDECTOMY. general    Anesthesia Post Evaluation      Multimodal analgesia: multimodal analgesia not used between 6 hours prior to anesthesia start to PACU discharge  Patient location during evaluation: PACU  Patient participation: complete - patient participated  Level of consciousness: awake  Pain management: adequate  Airway patency: patent  Anesthetic complications: no  Cardiovascular status: acceptable, blood pressure returned to baseline and hemodynamically stable  Respiratory status: acceptable  Hydration status: acceptable  Post anesthesia nausea and vomiting:  controlled  Final Post Anesthesia Temperature Assessment:  Normothermia (36.0-37.5 degrees C)      INITIAL Post-op Vital signs:   Vitals Value Taken Time   /88 01/17/22 1035   Temp 36.8 °C (98.3 °F) 01/17/22 0930   Pulse 77 01/17/22 1039   Resp 15 01/17/22 1039   SpO2 92 % 01/17/22 1040   Vitals shown include unvalidated device data.

## 2022-01-20 RX ORDER — DEXTROAMPHETAMINE SACCHARATE, AMPHETAMINE ASPARTATE, DEXTROAMPHETAMINE SULFATE AND AMPHETAMINE SULFATE 2.5; 2.5; 2.5; 2.5 MG/1; MG/1; MG/1; MG/1
10 TABLET ORAL DAILY
Qty: 30 TABLET | Refills: 0 | Status: SHIPPED | OUTPATIENT
Start: 2022-03-21 | End: 2022-04-20

## 2022-01-20 NOTE — TELEPHONE ENCOUNTER
Patient seen within the last 6 months and no iinconsistencies noted on .   Trying adderall for fibromyalgia

## 2022-01-25 ENCOUNTER — HOSPITAL ENCOUNTER (OUTPATIENT)
Dept: MRI IMAGING | Age: 62
Discharge: HOME | End: 2022-01-25
Attending: FAMILY MEDICINE
Payer: COMMERCIAL

## 2022-01-25 DIAGNOSIS — R51.9 NONINTRACTABLE HEADACHE, UNSPECIFIED CHRONICITY PATTERN, UNSPECIFIED HEADACHE TYPE: ICD-10-CM

## 2022-01-25 PROCEDURE — 70551 MRI BRAIN STEM W/O DYE: CPT

## 2022-01-26 NOTE — PROGRESS NOTES
Mild microvascular ischemic changes can relate to age -we recommend aspirin 81 mg daily if you can tolerate. There is nothing there to explain headache and the ataxia (stumbling) -can talk to PCP and consider neurology for opinon on how to proceed.

## 2022-01-28 ENCOUNTER — TELEPHONE (OUTPATIENT)
Dept: ENT CLINIC | Age: 62
End: 2022-01-28

## 2022-01-28 NOTE — TELEPHONE ENCOUNTER
Rep from [facility] called stating the medication sent for the nebulizer is not covered and she stated it needs prior auth.     Please advise    [facility] pharmacy # [phone]

## 2022-02-01 PROBLEM — Z00.00 ENCOUNTER FOR MEDICARE ANNUAL WELLNESS EXAM: Status: RESOLVED | Noted: 2022-01-02 | Resolved: 2022-02-01

## 2022-02-03 ENCOUNTER — TELEPHONE (OUTPATIENT)
Dept: ENT CLINIC | Age: 62
End: 2022-02-03

## 2022-02-03 NOTE — TELEPHONE ENCOUNTER
Attempted to reach Nancy Gill to confirm next appointment and left a voicemail asking the patient to call back to confirm.

## 2022-02-11 DIAGNOSIS — M79.7 FIBROMYALGIA: ICD-10-CM

## 2022-02-11 RX ORDER — MODAFINIL 200 MG/1
TABLET ORAL
Qty: 60 TABLET | Refills: 0 | Status: SHIPPED | OUTPATIENT
Start: 2022-02-11 | End: 2022-03-17

## 2022-02-11 NOTE — TELEPHONE ENCOUNTER
Patient has been seen in the last 6 months and medication was last refilled [id number].   No inconsistencies noted in

## 2022-02-24 ENCOUNTER — PATIENT MESSAGE (OUTPATIENT)
Dept: FAMILY MEDICINE CLINIC | Age: 62
End: 2022-02-24

## 2022-02-24 DIAGNOSIS — M79.7 FIBROMYALGIA: ICD-10-CM

## 2022-02-24 DIAGNOSIS — B37.31 CANDIDIASIS OF VULVA AND VAGINA: ICD-10-CM

## 2022-02-24 RX ORDER — LEVOTHYROXINE SODIUM 75 UG/1
TABLET ORAL
Qty: 90 TABLET | Refills: 1 | Status: SHIPPED | OUTPATIENT
Start: 2022-02-24 | End: 2022-09-17

## 2022-02-28 RX ORDER — PREGABALIN 100 MG/1
CAPSULE ORAL
Qty: 180 CAPSULE | Refills: 0 | Status: SHIPPED | OUTPATIENT
Start: 2022-02-28 | End: 2022-03-17

## 2022-02-28 RX ORDER — NYSTATIN 100000 [USP'U]/ML
SUSPENSION ORAL
Qty: 540 ML | Refills: 0 | Status: SHIPPED | OUTPATIENT
Start: 2022-02-28 | End: 2022-09-22

## 2022-02-28 NOTE — TELEPHONE ENCOUNTER
Last seen 12/30/2021 and med last refilled [id number] for 90 days.   Note to pharmacy not to refill until [id number]

## 2022-03-16 DIAGNOSIS — M79.7 FIBROMYALGIA: ICD-10-CM

## 2022-03-17 RX ORDER — MODAFINIL 200 MG/1
TABLET ORAL
Qty: 60 TABLET | Refills: 0 | Status: SHIPPED | OUTPATIENT
Start: 2022-03-17 | End: 2022-04-07

## 2022-03-17 RX ORDER — PREGABALIN 100 MG/1
CAPSULE ORAL
Qty: 180 CAPSULE | Refills: 0 | Status: SHIPPED | OUTPATIENT
Start: 2022-03-17 | End: 2022-06-20

## 2022-03-17 NOTE — TELEPHONE ENCOUNTER
Refilling provigil and lyrica. Provigil refilled [id number] and lyrica last refilled 12/2021.   No inconsistencies noted in

## 2022-03-18 PROBLEM — J45.20 MILD INTERMITTENT ASTHMA: Status: ACTIVE | Noted: 2018-05-11

## 2022-03-18 PROBLEM — K59.4 ANAL SPASM: Status: ACTIVE | Noted: 2021-07-14

## 2022-03-18 PROBLEM — Z99.89 OSA ON CPAP: Status: ACTIVE | Noted: 2018-05-11

## 2022-03-18 PROBLEM — E78.00 HYPERCHOLESTEROLEMIA: Status: ACTIVE | Noted: 2018-05-11

## 2022-03-18 PROBLEM — G47.33 OSA ON CPAP: Status: ACTIVE | Noted: 2018-05-11

## 2022-03-18 PROBLEM — J30.9 ALLERGIC RHINITIS: Status: ACTIVE | Noted: 2021-08-31

## 2022-03-19 PROBLEM — E03.9 ACQUIRED HYPOTHYROIDISM: Status: ACTIVE | Noted: 2018-05-11

## 2022-03-19 PROBLEM — E66.01 SEVERE OBESITY (BMI 35.0-39.9) WITH COMORBIDITY (HCC): Status: ACTIVE | Noted: 2018-05-11

## 2022-03-19 PROBLEM — F32.A DEPRESSION: Status: ACTIVE | Noted: 2018-05-11

## 2022-03-19 PROBLEM — G93.32 CFS (CHRONIC FATIGUE SYNDROME): Status: ACTIVE | Noted: 2018-05-11

## 2022-03-19 PROBLEM — L71.9 ROSACEA: Status: ACTIVE | Noted: 2018-05-11

## 2022-03-19 PROBLEM — J32.0 CHRONIC MAXILLARY SINUSITIS: Status: ACTIVE | Noted: 2021-08-31

## 2022-03-20 ENCOUNTER — PATIENT MESSAGE (OUTPATIENT)
Dept: FAMILY MEDICINE CLINIC | Age: 62
End: 2022-03-20

## 2022-03-20 PROBLEM — F33.2 SEVERE EPISODE OF RECURRENT MAJOR DEPRESSIVE DISORDER, WITHOUT PSYCHOTIC FEATURES (HCC): Status: ACTIVE | Noted: 2018-05-11

## 2022-03-20 PROBLEM — M72.2 PLANTAR FASCIITIS, BILATERAL: Status: ACTIVE | Noted: 2018-05-11

## 2022-03-20 PROBLEM — M79.7 FIBROMYALGIA: Status: ACTIVE | Noted: 2018-05-11

## 2022-03-20 PROBLEM — J34.2 DEVIATED SEPTUM: Status: ACTIVE | Noted: 2021-08-31

## 2022-03-21 RX ORDER — DICLOFENAC SODIUM 75 MG/1
75 TABLET, DELAYED RELEASE ORAL 2 TIMES DAILY
Qty: 180 TABLET | Refills: 1 | Status: SHIPPED | OUTPATIENT
Start: 2022-03-21 | End: 2022-09-07

## 2022-03-21 RX ORDER — FLUTICASONE FUROATE AND VILANTEROL TRIFENATATE 100; 25 UG/1; UG/1
1 POWDER RESPIRATORY (INHALATION) DAILY
Qty: 60 EACH | Refills: 2 | Status: SHIPPED | OUTPATIENT
Start: 2022-03-21 | End: 2022-04-14 | Stop reason: SDUPTHER

## 2022-03-21 NOTE — TELEPHONE ENCOUNTER
From: Liam Tejada  To: Thao Maxwell NP  Sent: 3/20/2022 6:53 PM EDT  Subject: Scripts     Also, could you please send in a renewal for the Diclofenac gel and for the breo elliptA?  They never got pulled over correctly to [facility]    Thanks in advance,  Dejan Arechiga

## 2022-03-23 DIAGNOSIS — I10 ESSENTIAL (PRIMARY) HYPERTENSION: ICD-10-CM

## 2022-03-24 RX ORDER — LOSARTAN POTASSIUM 100 MG/1
TABLET ORAL
Qty: 90 TABLET | Refills: 2 | Status: SHIPPED | OUTPATIENT
Start: 2022-03-24 | End: 2022-06-27

## 2022-03-28 ENCOUNTER — TELEPHONE (OUTPATIENT)
Dept: FAMILY MEDICINE CLINIC | Age: 62
End: 2022-03-28

## 2022-03-28 NOTE — TELEPHONE ENCOUNTER
Caller stated that she was calling because she needs the medical records/anything supporting why the pt needs Mayra. Fax: [phone]  .

## 2022-04-07 DIAGNOSIS — E78.00 HYPERCHOLESTEROLEMIA: ICD-10-CM

## 2022-04-07 DIAGNOSIS — M79.7 FIBROMYALGIA: ICD-10-CM

## 2022-04-07 RX ORDER — MODAFINIL 200 MG/1
TABLET ORAL
Qty: 60 TABLET | Refills: 0 | Status: SHIPPED | OUTPATIENT
Start: 2022-04-07 | End: 2022-04-20

## 2022-04-07 RX ORDER — ATORVASTATIN CALCIUM 10 MG/1
TABLET, FILM COATED ORAL
Qty: 90 TABLET | Refills: 1 | Status: SHIPPED | OUTPATIENT
Start: 2022-04-07 | End: 2022-05-25

## 2022-04-07 RX ORDER — TRAZODONE HYDROCHLORIDE 100 MG/1
TABLET ORAL
Qty: 360 TABLET | Refills: 1 | Status: SHIPPED | OUTPATIENT
Start: 2022-04-07 | End: 2022-05-25

## 2022-04-07 NOTE — TELEPHONE ENCOUNTER
Patient has been seen in the last 6 months and the med was refilled [id number]. Note to pharmacy not to refill until [id number].   No inconsistencies noted on

## 2022-04-14 ENCOUNTER — TELEPHONE (OUTPATIENT)
Dept: FAMILY MEDICINE CLINIC | Age: 62
End: 2022-04-14

## 2022-04-14 RX ORDER — FLUTICASONE FUROATE AND VILANTEROL TRIFENATATE 100; 25 UG/1; UG/1
1 POWDER RESPIRATORY (INHALATION) DAILY
Qty: 60 EACH | Refills: 2 | Status: SHIPPED | OUTPATIENT
Start: 2022-04-14

## 2022-04-14 RX ORDER — DICLOFENAC SODIUM 10 MG/G
GEL TOPICAL 4 TIMES DAILY
Qty: 2 G | Refills: 2 | Status: SHIPPED | OUTPATIENT
Start: 2022-04-14

## 2022-04-14 NOTE — TELEPHONE ENCOUNTER
Patient also requested fluticasone propitionate.   My chart message to let her know that these meds have the same ingredient (my chart)

## 2022-04-18 DIAGNOSIS — M79.7 FIBROMYALGIA: ICD-10-CM

## 2022-04-20 RX ORDER — MODAFINIL 200 MG/1
TABLET ORAL
Qty: 60 TABLET | Refills: 0 | Status: SHIPPED | OUTPATIENT
Start: 2022-04-20 | End: 2022-05-09 | Stop reason: ALTCHOICE

## 2022-04-20 NOTE — TELEPHONE ENCOUNTER
Patient has been seen in the last 6 months and med last refilled [id number].   No inconsistencies noted in

## 2022-05-09 DIAGNOSIS — G47.419 PRIMARY NARCOLEPSY WITHOUT CATAPLEXY: Primary | ICD-10-CM

## 2022-05-09 RX ORDER — ARMODAFINIL 250 MG/1
250 TABLET ORAL DAILY
Qty: 90 TABLET | Refills: 0 | Status: SHIPPED | OUTPATIENT
Start: 2022-05-09 | End: 2022-05-19 | Stop reason: SDUPTHER

## 2022-05-09 NOTE — TELEPHONE ENCOUNTER
Patient portal message that states that it is time to refill her provigil and she would like to try armodafinil but she is not due for a refill until [id number]. Note to pharmacy not to refill until then.   No inconsistencies noted in

## 2022-05-19 DIAGNOSIS — G47.419 PRIMARY NARCOLEPSY WITHOUT CATAPLEXY: ICD-10-CM

## 2022-05-19 RX ORDER — ARMODAFINIL 250 MG/1
250 TABLET ORAL DAILY
Qty: 90 TABLET | Refills: 0 | Status: SHIPPED | OUTPATIENT
Start: 2022-05-19 | End: 2022-08-19 | Stop reason: ALTCHOICE

## 2022-05-19 RX ORDER — ARMODAFINIL 250 MG/1
250 TABLET ORAL DAILY
Qty: 90 TABLET | Refills: 0 | Status: SHIPPED | OUTPATIENT
Start: 2022-05-19 | End: 2022-05-19 | Stop reason: SDUPTHER

## 2022-05-19 NOTE — TELEPHONE ENCOUNTER
Calling in to a different pharmacy.   NO inconsistencies noted in  and patient has been seen in the last 6 months

## 2022-05-22 DIAGNOSIS — E78.00 HYPERCHOLESTEROLEMIA: ICD-10-CM

## 2022-05-25 RX ORDER — TRAZODONE HYDROCHLORIDE 100 MG/1
TABLET ORAL
Qty: 360 TABLET | Refills: 1 | Status: SHIPPED | OUTPATIENT
Start: 2022-05-25

## 2022-05-25 RX ORDER — ATORVASTATIN CALCIUM 10 MG/1
TABLET, FILM COATED ORAL
Qty: 90 TABLET | Refills: 1 | Status: SHIPPED | OUTPATIENT
Start: 2022-05-25

## 2022-06-01 ENCOUNTER — TELEPHONE (OUTPATIENT)
Dept: FAMILY MEDICINE CLINIC | Age: 62
End: 2022-06-01

## 2022-06-01 NOTE — TELEPHONE ENCOUNTER
Unfortunately not. You are on diclofenac and it is the same category as the naproxen (NSAIDS).   You cannot be on two meds in the same category

## 2022-06-08 ENCOUNTER — PATIENT MESSAGE (OUTPATIENT)
Dept: FAMILY MEDICINE CLINIC | Age: 62
End: 2022-06-08

## 2022-06-08 RX ORDER — CIPROFLOXACIN HYDROCHLORIDE 3.5 MG/ML
1 SOLUTION/ DROPS TOPICAL
Qty: 10 ML | Refills: 0 | Status: SHIPPED | OUTPATIENT
Start: 2022-06-08 | End: 2022-06-30 | Stop reason: ALTCHOICE

## 2022-06-08 NOTE — TELEPHONE ENCOUNTER
From: Bernice Robledo  To: Anton Askew NP  Sent: 6/8/2022 3:53 PM EDT  Subject: Stye    Could you please send in an antibiotic cream for my eyes? I dont get styles often but I do get them regularly.

## 2022-06-19 DIAGNOSIS — M79.7 FIBROMYALGIA: ICD-10-CM

## 2022-06-20 RX ORDER — PREGABALIN 100 MG/1
CAPSULE ORAL
Qty: 180 CAPSULE | Refills: 0 | Status: SHIPPED | OUTPATIENT
Start: 2022-06-20 | End: 2022-09-17

## 2022-06-23 RX ORDER — DULOXETIN HYDROCHLORIDE 60 MG/1
CAPSULE, DELAYED RELEASE ORAL
Qty: 180 CAPSULE | Refills: 1 | Status: SHIPPED | OUTPATIENT
Start: 2022-06-23 | End: 2022-09-25 | Stop reason: ALTCHOICE

## 2022-06-26 DIAGNOSIS — I10 ESSENTIAL (PRIMARY) HYPERTENSION: ICD-10-CM

## 2022-06-27 RX ORDER — LOSARTAN POTASSIUM 100 MG/1
TABLET ORAL
Qty: 90 TABLET | Refills: 2 | Status: SHIPPED | OUTPATIENT
Start: 2022-06-27

## 2022-06-30 ENCOUNTER — OFFICE VISIT (OUTPATIENT)
Dept: FAMILY MEDICINE CLINIC | Age: 62
End: 2022-06-30
Payer: COMMERCIAL

## 2022-06-30 VITALS
RESPIRATION RATE: 16 BRPM | SYSTOLIC BLOOD PRESSURE: 140 MMHG | WEIGHT: 241 LBS | HEIGHT: 65 IN | OXYGEN SATURATION: 94 % | BODY MASS INDEX: 40.15 KG/M2 | DIASTOLIC BLOOD PRESSURE: 82 MMHG | TEMPERATURE: 97.2 F | HEART RATE: 88 BPM

## 2022-06-30 DIAGNOSIS — F41.9 ANXIETY: ICD-10-CM

## 2022-06-30 DIAGNOSIS — G47.33 OSA ON CPAP: ICD-10-CM

## 2022-06-30 DIAGNOSIS — F33.2 SEVERE EPISODE OF RECURRENT MAJOR DEPRESSIVE DISORDER, WITHOUT PSYCHOTIC FEATURES (HCC): ICD-10-CM

## 2022-06-30 DIAGNOSIS — Z13.1 DIABETES MELLITUS SCREENING: ICD-10-CM

## 2022-06-30 DIAGNOSIS — E55.9 VITAMIN D DEFICIENCY: ICD-10-CM

## 2022-06-30 DIAGNOSIS — G93.32 CFS (CHRONIC FATIGUE SYNDROME): ICD-10-CM

## 2022-06-30 DIAGNOSIS — I10 HYPERTENSION, UNSPECIFIED TYPE: Primary | ICD-10-CM

## 2022-06-30 DIAGNOSIS — E78.00 HYPERCHOLESTEROLEMIA: ICD-10-CM

## 2022-06-30 DIAGNOSIS — E66.01 SEVERE OBESITY (BMI 35.0-39.9) WITH COMORBIDITY (HCC): ICD-10-CM

## 2022-06-30 DIAGNOSIS — J32.0 CHRONIC MAXILLARY SINUSITIS: ICD-10-CM

## 2022-06-30 DIAGNOSIS — E03.9 ACQUIRED HYPOTHYROIDISM: ICD-10-CM

## 2022-06-30 DIAGNOSIS — M79.7 FIBROMYALGIA: ICD-10-CM

## 2022-06-30 DIAGNOSIS — J34.2 DEVIATED SEPTUM: ICD-10-CM

## 2022-06-30 DIAGNOSIS — Z99.89 OSA ON CPAP: ICD-10-CM

## 2022-06-30 PROBLEM — K59.4 ANAL SPASM: Status: RESOLVED | Noted: 2021-07-14 | Resolved: 2022-06-30

## 2022-06-30 PROCEDURE — G9899 SCRN MAM PERF RSLTS DOC: HCPCS | Performed by: NURSE PRACTITIONER

## 2022-06-30 PROCEDURE — G8754 DIAS BP LESS 90: HCPCS | Performed by: NURSE PRACTITIONER

## 2022-06-30 PROCEDURE — G8427 DOCREV CUR MEDS BY ELIG CLIN: HCPCS | Performed by: NURSE PRACTITIONER

## 2022-06-30 PROCEDURE — G9717 DOC PT DX DEP/BP F/U NT REQ: HCPCS | Performed by: NURSE PRACTITIONER

## 2022-06-30 PROCEDURE — G8417 CALC BMI ABV UP PARAM F/U: HCPCS | Performed by: NURSE PRACTITIONER

## 2022-06-30 PROCEDURE — 99214 OFFICE O/P EST MOD 30 MIN: CPT | Performed by: NURSE PRACTITIONER

## 2022-06-30 PROCEDURE — 3017F COLORECTAL CA SCREEN DOC REV: CPT | Performed by: NURSE PRACTITIONER

## 2022-06-30 PROCEDURE — G8753 SYS BP > OR = 140: HCPCS | Performed by: NURSE PRACTITIONER

## 2022-06-30 RX ORDER — GLYCOPYRROLATE 2 MG/1
2 TABLET ORAL 3 TIMES DAILY
Qty: 270 TABLET | Refills: 1 | Status: SHIPPED | OUTPATIENT
Start: 2022-06-30

## 2022-06-30 RX ORDER — MONTELUKAST SODIUM 10 MG/1
10 TABLET ORAL DAILY
Qty: 90 TABLET | Refills: 2 | Status: SHIPPED | OUTPATIENT
Start: 2022-06-30

## 2022-06-30 NOTE — PROGRESS NOTES
Subjective  Chief Complaint   Patient presents with    Follow Up Chronic Condition     htn, thyorid     HPI:  Juma Cullen is a 63 y.o. female. 62 yo female presents for f/u of chronic conditions which include HTN, Thyroid disorder, anemia , anxiety, chronic pain of neck and back, Insomnia,  HTN and seasonal allergies. She is adherent with her medication regimen and does not monitor her blood pressure. She reports that her anxiety and depression over her fibromyalgia is worse today and she is tearful during the visit. She was seeing a counselor but had to stop for financial reasons. She also reports that she is doing a great deal of sweating.   This is a longstanding problem and patient has not found anything that makes it better or worse    Past Medical History:   Diagnosis Date    Acquired talipes planus, unspecified laterality     Anal spasm 7/14/2021    Anemia     Anxiety     Bronchitis     Chronic pain     neck and back    Dysphagia     Hashimoto's thyroiditis     Headache     Hypercholesterolemia     Hypertensive disorder     Insomnia     Osteoarthritis     Plantar fasciitis     Seasonal allergic reaction      Family History   Problem Relation Age of Onset    COPD Mother     COPD Father     Heart Attack Father     Cancer Sister         skin    Diabetes Brother     Breast Cancer Paternal Grandmother      Social History     Socioeconomic History    Marital status:      Spouse name: Not on file    Number of children: Not on file    Years of education: Not on file    Highest education level: Not on file   Occupational History    Not on file   Tobacco Use    Smoking status: Never Smoker    Smokeless tobacco: Never Used   Vaping Use    Vaping Use: Some days    Substances: CBD   Substance and Sexual Activity    Alcohol use: Not Currently     Alcohol/week: 1.0 standard drink     Types: 1 Glasses of wine per week    Drug use: Yes     Types: Marijuana     Comment: medical  Sexual activity: Never   Other Topics Concern    Not on file   Social History Narrative    Not on file     Social Determinants of Health     Financial Resource Strain: Medium Risk    Difficulty of Paying Living Expenses: Somewhat hard   Food Insecurity: No Food Insecurity    Worried About Running Out of Food in the Last Year: Never true    Colt of Food in the Last Year: Never true   Transportation Needs:     Lack of Transportation (Medical): Not on file    Lack of Transportation (Non-Medical):  Not on file   Physical Activity:     Days of Exercise per Week: Not on file    Minutes of Exercise per Session: Not on file   Stress:     Feeling of Stress : Not on file   Social Connections:     Frequency of Communication with Friends and Family: Not on file    Frequency of Social Gatherings with Friends and Family: Not on file    Attends Amish Services: Not on file    Active Member of Clubs or Organizations: Not on file    Attends Club or Organization Meetings: Not on file    Marital Status: Not on file   Intimate Partner Violence:     Fear of Current or Ex-Partner: Not on file    Emotionally Abused: Not on file    Physically Abused: Not on file    Sexually Abused: Not on file   Housing Stability:     Unable to Pay for Housing in the Last Year: Not on file    Number of Places Lived in the Last Year: Not on file    Unstable Housing in the Last Year: Not on file     Current Outpatient Medications on File Prior to Visit   Medication Sig Dispense Refill    losartan (COZAAR) 100 mg tablet TAKE ONE TABLET BY MOUTH ONE TIME DAILY 90 Tablet 2    DULoxetine (CYMBALTA) 60 mg capsule TAKE ONE CAPSULE BY MOUTH TWICE A  Capsule 1    pregabalin (LYRICA) 100 mg capsule TAKE ONE CAPSULE BY MOUTH TWICE A  Capsule 0    traZODone (DESYREL) 100 mg tablet TAKE FOUR TABLETS BY MOUTH AT BEDTIME 360 Tablet 1    atorvastatin (LIPITOR) 10 mg tablet TAKE ONE TABLET BY MOUTH ONE TIME DAILY 90 Tablet 1    armodafiniL (NUVIGIL) 250 mg tab tablet Take 1 Tablet by mouth daily. Max Daily Amount: 250 mg. 90 Tablet 0    fluticasone furoate-vilanteroL (Breo Ellipta) 100-25 mcg/dose inhaler Take 1 Puff by inhalation daily. 60 Each 2    diclofenac (VOLTAREN) 1 % gel Apply  to affected area four (4) times daily. 2 g 2    diclofenac EC (VOLTAREN) 75 mg EC tablet Take 1 Tablet by mouth two (2) times a day. 180 Tablet 1    nystatin (MYCOSTATIN) 100,000 unit/mL suspension TAKE 15 ML BY MOUTH FOUR TIMES DAILY 540 mL 0    levothyroxine (SYNTHROID) 75 mcg tablet TAKE ONE TABLET BY MOUTH ONE TIME DAILY 90 Tablet 1    cetirizine (ZYRTEC) 10 mg tablet Take 10 mg by mouth daily.  ciprofloxacin HCl (CILOXAN) 0.3 % ophthalmic solution Apply 1 Drop to eye every two (2) hours. (Patient not taking: Reported on 6/30/2022) 10 mL 0    milnacipran (Savella) 25 mg tablet Take 1 Tablet by mouth daily. (Patient not taking: Reported on 6/30/2022) 90 Tablet 0    semaglutide (RYBELSUS) 3 mg tablet Take 1 Tablet by mouth Daily (before breakfast). (Patient not taking: Reported on 6/30/2022) 30 Tablet 0    DULoxetine (CYMBALTA) 30 mg capsule Take 60 mg by mouth daily. (Patient not taking: Reported on 6/30/2022)      [DISCONTINUED] montelukast (SINGULAIR) 10 mg tablet TAKE ONE TABLET BY MOUTH ONE TIME DAILY 90 Tablet 2     No current facility-administered medications on file prior to visit. Allergies   Allergen Reactions    Methylprednisolone Unknown (comments)     insomnia    Silicone Rash    Penicillins Nausea and Vomiting     ROS   ROS per HPI and PMH      Objective  Physical Exam  Vitals and nursing note reviewed. HENT:      Head: Normocephalic. Cardiovascular:      Rate and Rhythm: Normal rate and regular rhythm. Heart sounds: Normal heart sounds. Pulmonary:      Effort: Pulmonary effort is normal.      Breath sounds: Normal breath sounds.    Abdominal:      General: Bowel sounds are normal.   Skin:     General: Skin is warm and dry. Neurological:      Mental Status: She is oriented to person, place, and time. Psychiatric:         Mood and Affect: Mood normal.         Behavior: Behavior normal.         Thought Content: Thought content normal.         Judgment: Judgment normal.          Assessment & Plan      ICD-10-CM ICD-9-CM    1. Hypertension, unspecified type  I10 401.9    2. Acquired hypothyroidism  E03.9 244.9 TSH 3RD GENERATION      T4, FREE   3. Hypercholesterolemia  E78.00 272.0 LIPID PANEL   4. Severe obesity (BMI 35.0-39. 9) with comorbidity ([facility]  E66.01 278.01 CBC WITH AUTOMATED DIFF      METABOLIC PANEL, COMPREHENSIVE   5. Diabetes mellitus screening  Z13.1 V77.1    6. Vitamin D deficiency  E55.9 268.9 VITAMIN D, 25 HYDROXY   7. CFS (chronic fatigue syndrome)  R53.82 780.71    8. Chronic maxillary sinusitis  J32.0 473.0    9. Deviated septum  J34.2 470    10. CLEO on CPAP  G47.33 327.23     Z99.89 V46.8    11. Anxiety  F41.9 300.00    12. Severe episode of recurrent major depressive disorder, without psychotic features ([facility]  F33.2 296.33    13. Fibromyalgia  M79.7 729.1      Diagnoses and all orders for this visit:    1. Hypertension, unspecified type  BP is at goal per [facility] guideline. Will maintain patient on current regimen at present dosage    2. Acquired hypothyroidism  -     TSH 3RD GENERATION  -     T4, FREE  Obtaining updated TSH and T4 for trending and will make treatment decisions when I get the results      3. Hypercholesterolemia  -     LIPID PANEL  Obtaining updated lipid panel for trending and will make treatment decisions when I get the results      4. Severe obesity (BMI 35.0-39. 9) with comorbidity ([facility]  -     CBC WITH AUTOMATED DIFF  -     METABOLIC PANEL, COMPREHENSIVE  Obtaining updated CBC and CMPfor trending and will make treatment decisions when I get the results            6. Vitamin D deficiency  -     VITAMIN D, 25 HYDROXY  Obtaining updated Vitamin D for trending and will make treatment decisions when I get the results    7. CFS (chronic fatigue syndrome)  Patient followed by rheumatology for this condition    8. Chronic maxillary sinusitis  Patient managed by ENT    9. Deviated septum  Patient has been evaluated by ENT    10. CLEO on CPAP  Patient is adherent with the use of her cpap and will refer back to sleep medicine if there are any problems    11. Anxiety  Patient appears well controlled on the cymbalta. We will maintain patient on this medication at its current dosage      13. Fibromyalgia  Patient is treated by rheumatology for this condition    Other orders  -     montelukast (SINGULAIR) 10 mg tablet; Take 1 Tablet by mouth daily. -     glycopyrrolate 2 mg tablet; Take 1 Tablet by mouth three (3) times daily. Follow-up and Dispositions    · Return in about 3 weeks (around 7/21/2022) for anxiety and depression management.        Clearance Reas, NP

## 2022-06-30 NOTE — PROGRESS NOTES
Chief Complaint   Patient presents with    Follow Up Chronic Condition     htn, thyorid     1. \"Have you been to the ER, urgent care clinic since your last visit? Hospitalized since your last visit? \" No    2. \"Have you seen or consulted any other health care providers outside of the [address] [place] [address] since your last visit? \" No     3. For patients aged 39-70: Has the patient had a colonoscopy / FIT/ Cologuard? No      If the patient is female:    4. For patients aged 41-77: Has the patient had a mammogram within the past 2 years? No      5. For patients aged 21-65: Has the patient had a pap smear?  No      Food Insecurity: No Food Insecurity    Worried About Running Out of Food in the Last Year: Never true    Ran Out of Food in the Last Year: Never true        Financial Resource Strain: Medium Risk    Difficulty of Paying Living Expenses: Somewhat hard         3 most recent PHQ Screens 6/30/2022   Little interest or pleasure in doing things Several days   Feeling down, depressed, irritable, or hopeless More than half the days   Total Score PHQ 2 3   Trouble falling or staying asleep, or sleeping too much More than half the days   Feeling tired or having little energy More than half the days   Poor appetite, weight loss, or overeating More than half the days   Feeling bad about yourself - or that you are a failure or have let yourself or your family down More than half the days   Trouble concentrating on things such as school, work, reading, or watching TV More than half the days   Moving or speaking so slowly that other people could have noticed; or the opposite being so fidgety that others notice More than half the days   Thoughts of being better off dead, or hurting yourself in some way More than half the days   PHQ 9 Score 17   How difficult have these problems made it for you to do your work, take care of your home and get along with others Not difficult at all

## 2022-07-01 LAB
25(OH)D3+25(OH)D2 SERPL-MCNC: 63.7 NG/ML (ref 30–100)
ALBUMIN SERPL-MCNC: 4.9 G/DL (ref 3.8–4.9)
ALBUMIN/GLOB SERPL: 1.8 {RATIO} (ref 1.2–2.2)
ALP SERPL-CCNC: 130 IU/L (ref 44–121)
ALT SERPL-CCNC: 39 IU/L (ref 0–32)
AST SERPL-CCNC: 28 IU/L (ref 0–40)
BASOPHILS # BLD AUTO: 0.1 X10E3/UL (ref 0–0.2)
BASOPHILS NFR BLD AUTO: 1 %
BILIRUB SERPL-MCNC: 0.4 MG/DL (ref 0–1.2)
BUN SERPL-MCNC: 16 MG/DL (ref 6–24)
BUN/CREAT SERPL: 19 (ref 9–23)
CALCIUM SERPL-MCNC: 9.8 MG/DL (ref 8.7–10.2)
CHLORIDE SERPL-SCNC: 99 MMOL/L (ref 96–106)
CHOLEST SERPL-MCNC: 216 MG/DL (ref 100–199)
CO2 SERPL-SCNC: 24 MMOL/L (ref 20–29)
CREAT SERPL-MCNC: 0.85 MG/DL (ref 0.57–1)
EGFR: 79 ML/MIN/1.73
EOSINOPHIL # BLD AUTO: 0.3 X10E3/UL (ref 0–0.4)
EOSINOPHIL NFR BLD AUTO: 3 %
ERYTHROCYTE [DISTWIDTH] IN BLOOD BY AUTOMATED COUNT: 13.4 % (ref 11.7–15.4)
GLOBULIN SER CALC-MCNC: 2.8 G/DL (ref 1.5–4.5)
GLUCOSE SERPL-MCNC: 99 MG/DL (ref 65–99)
HCT VFR BLD AUTO: 43.3 % (ref 34–46.6)
HDLC SERPL-MCNC: 59 MG/DL
HGB BLD-MCNC: 14.9 G/DL (ref 11.1–15.9)
IMM GRANULOCYTES # BLD AUTO: 0 X10E3/UL (ref 0–0.1)
IMM GRANULOCYTES NFR BLD AUTO: 0 %
LDLC SERPL CALC-MCNC: 138 MG/DL (ref 0–99)
LYMPHOCYTES # BLD AUTO: 2.4 X10E3/UL (ref 0.7–3.1)
LYMPHOCYTES NFR BLD AUTO: 24 %
MCH RBC QN AUTO: 29.4 PG (ref 26.6–33)
MCHC RBC AUTO-ENTMCNC: 34.4 G/DL (ref 31.5–35.7)
MCV RBC AUTO: 86 FL (ref 79–97)
MONOCYTES # BLD AUTO: 0.7 X10E3/UL (ref 0.1–0.9)
MONOCYTES NFR BLD AUTO: 7 %
NEUTROPHILS # BLD AUTO: 6.5 X10E3/UL (ref 1.4–7)
NEUTROPHILS NFR BLD AUTO: 65 %
PLATELET # BLD AUTO: 308 X10E3/UL (ref 150–450)
POTASSIUM SERPL-SCNC: 4.6 MMOL/L (ref 3.5–5.2)
PROT SERPL-MCNC: 7.7 G/DL (ref 6–8.5)
RBC # BLD AUTO: 5.06 X10E6/UL (ref 3.77–5.28)
SODIUM SERPL-SCNC: 140 MMOL/L (ref 134–144)
T4 FREE SERPL-MCNC: 1.69 NG/DL (ref 0.82–1.77)
TRIGL SERPL-MCNC: 106 MG/DL (ref 0–149)
TSH SERPL DL<=0.005 MIU/L-ACNC: 3.06 UIU/ML (ref 0.45–4.5)
VLDLC SERPL CALC-MCNC: 19 MG/DL (ref 5–40)
WBC # BLD AUTO: 10 X10E3/UL (ref 3.4–10.8)

## 2022-07-01 NOTE — PROGRESS NOTES
2 of yur liver labs are slightly elevated. Sometimes these elevation are due to lifestyle choice but we will monitor and see if they remain stable. If not we can talk about a possible ultrasound of the liver,  Your total cholesterol and one of your bad cholesterols are slightly elevated and I would like to start with lifestyle changes such as:  Decrease your intake of red meat and increase your intake of fatty fish such as salmon, mackerel and sardines. Increase fiber in your diet. Decrease your intake of full fat dairy products such as milk and yogurt and try to incorporate at least 30 minutes of exercise most days of the week and losing weight helps everything. Your other labs are basically normal.  Some values may be minimally outside the  \"normal\" range but are not harmful or clinically significant. Please contact the office if you have questions or concerns.   We will recheck all your labs at your next office visit

## 2022-07-15 RX ORDER — DOXYCYCLINE 100 MG/1
TABLET ORAL
Qty: 20 TABLET | Refills: 0 | OUTPATIENT
Start: 2022-07-15

## 2022-07-30 ENCOUNTER — PATIENT MESSAGE (OUTPATIENT)
Dept: FAMILY MEDICINE CLINIC | Age: 62
End: 2022-07-30

## 2022-08-01 ENCOUNTER — PATIENT MESSAGE (OUTPATIENT)
Dept: FAMILY MEDICINE CLINIC | Age: 62
End: 2022-08-01

## 2022-08-01 DIAGNOSIS — D89.89 AUTOIMMUNE DISORDER (HCC): Primary | ICD-10-CM

## 2022-08-01 NOTE — TELEPHONE ENCOUNTER
From: Veronica Kamara  To: Samy Cruz NP  Sent: 7/30/2022 3:10 PM EDT  Subject: Stuff     Several things    1. Home bound  2. cymbalta  3. Wheelchair  4. Nuvigil  5. Handicapped parking    1. I think I qualify as Home Bound now. I cant even make it up and down the stairs anymore. I move downstairs during the week (for the dogs) and move back up on the weekends. I cant even make it to drs appointments anymore. Once Im better I will be back in. Max Puls! Holding my breath.)    2. Did the system allow you to break the pill size up for the cymbalta script? Not sure its even a good idea to try and go down with the way Im feeling    3. I leave the house maybe 2 days in a 3 month span and can only go somewhere they have motorized wheelchairs for the most part, especially if its a big parking lot like a hospital or big box store. Is there some sort of designation I need to go through the insurance to try and get one of my own to use? 4. Phil changed their source for the armodafinil to a company in [facility]. Apparently the Nancy Napier is still [place] if we could please change the script to that for next time? According to an independent study [place] ([facility]) manufactured prescription drugs have an 83% chance of testing as the right drug. (Ehdon me but wtf? ?!! Lol). Needless to say the  has changed since last we spoke. NameImpressions.gl. ashx    5. I need to reapply for handicapped parking. Does that start with you?     Delia Heart

## 2022-08-02 ENCOUNTER — PATIENT MESSAGE (OUTPATIENT)
Dept: FAMILY MEDICINE CLINIC | Age: 62
End: 2022-08-02

## 2022-08-15 NOTE — TELEPHONE ENCOUNTER
Patient sent my chart note to see if she has a relationship with a neurologist and will refer if not

## 2022-08-18 NOTE — TELEPHONE ENCOUNTER
I have put in for the labs for testing for mast cell activation. (Histamine and tryptase).   You can call and make an appointment to come in to the office or you can go to any [facility]

## 2022-08-19 ENCOUNTER — PATIENT MESSAGE (OUTPATIENT)
Dept: FAMILY MEDICINE CLINIC | Age: 62
End: 2022-08-19

## 2022-08-19 ENCOUNTER — VIRTUAL VISIT (OUTPATIENT)
Dept: FAMILY MEDICINE CLINIC | Age: 62
End: 2022-08-19
Payer: COMMERCIAL

## 2022-08-19 DIAGNOSIS — F33.2 SEVERE EPISODE OF RECURRENT MAJOR DEPRESSIVE DISORDER, WITHOUT PSYCHOTIC FEATURES (HCC): ICD-10-CM

## 2022-08-19 DIAGNOSIS — G93.32 CFS (CHRONIC FATIGUE SYNDROME): ICD-10-CM

## 2022-08-19 DIAGNOSIS — J32.0 CHRONIC MAXILLARY SINUSITIS: ICD-10-CM

## 2022-08-19 DIAGNOSIS — M79.7 FIBROMYALGIA: Primary | ICD-10-CM

## 2022-08-19 DIAGNOSIS — G93.32 CFS (CHRONIC FATIGUE SYNDROME): Primary | ICD-10-CM

## 2022-08-19 DIAGNOSIS — G47.419 PRIMARY NARCOLEPSY WITHOUT CATAPLEXY: ICD-10-CM

## 2022-08-19 DIAGNOSIS — Z74.09 DECREASED MOBILITY AND ENDURANCE: ICD-10-CM

## 2022-08-19 DIAGNOSIS — M79.7 FIBROMYALGIA: ICD-10-CM

## 2022-08-19 DIAGNOSIS — J34.2 DEVIATED SEPTUM: ICD-10-CM

## 2022-08-19 DIAGNOSIS — M15.9 PRIMARY OSTEOARTHRITIS INVOLVING MULTIPLE JOINTS: ICD-10-CM

## 2022-08-19 DIAGNOSIS — G43.909 MIGRAINE WITHOUT STATUS MIGRAINOSUS, NOT INTRACTABLE, UNSPECIFIED MIGRAINE TYPE: ICD-10-CM

## 2022-08-19 PROCEDURE — G8756 NO BP MEASURE DOC: HCPCS | Performed by: NURSE PRACTITIONER

## 2022-08-19 PROCEDURE — G8417 CALC BMI ABV UP PARAM F/U: HCPCS | Performed by: NURSE PRACTITIONER

## 2022-08-19 PROCEDURE — 3017F COLORECTAL CA SCREEN DOC REV: CPT | Performed by: NURSE PRACTITIONER

## 2022-08-19 PROCEDURE — G9717 DOC PT DX DEP/BP F/U NT REQ: HCPCS | Performed by: NURSE PRACTITIONER

## 2022-08-19 PROCEDURE — 99214 OFFICE O/P EST MOD 30 MIN: CPT | Performed by: NURSE PRACTITIONER

## 2022-08-19 PROCEDURE — G8427 DOCREV CUR MEDS BY ELIG CLIN: HCPCS | Performed by: NURSE PRACTITIONER

## 2022-08-19 PROCEDURE — G9899 SCRN MAM PERF RSLTS DOC: HCPCS | Performed by: NURSE PRACTITIONER

## 2022-08-19 RX ORDER — ARMODAFINIL 250 MG/1
250 TABLET ORAL DAILY
Qty: 90 TABLET | Refills: 1 | Status: SHIPPED | OUTPATIENT
Start: 2022-08-19 | End: 2022-09-07

## 2022-08-19 RX ORDER — SUMATRIPTAN 20 MG/1
1 SPRAY NASAL AS NEEDED
Qty: 1 EACH | Refills: 2 | Status: SHIPPED | OUTPATIENT
Start: 2022-08-19 | End: 2022-08-24

## 2022-08-19 NOTE — PROGRESS NOTES
Chief Complaint   Patient presents with    Follow Up Chronic Condition     Pt has a list of things to talk about     1. \"Have you been to the ER, urgent care clinic since your last visit? Hospitalized since your last visit? \" No    2. \"Have you seen or consulted any other health care providers outside of the [address] [place] [address] since your last visit? \" No     3. For patients aged 39-70: Has the patient had a colonoscopy / FIT/ Cologuard? No      If the patient is female:    4. For patients aged 41-77: Has the patient had a mammogram within the past 2 years? No      5. For patients aged 21-65: Has the patient had a pap smear? No      [email or web address]

## 2022-08-23 ENCOUNTER — PATIENT MESSAGE (OUTPATIENT)
Dept: FAMILY MEDICINE CLINIC | Age: 62
End: 2022-08-23

## 2022-08-24 RX ORDER — RIZATRIPTAN BENZOATE 10 MG/1
TABLET, ORALLY DISINTEGRATING ORAL
Qty: 30 TABLET | Refills: 1 | Status: SHIPPED | OUTPATIENT
Start: 2022-08-24

## 2022-09-03 DIAGNOSIS — G47.419 PRIMARY NARCOLEPSY WITHOUT CATAPLEXY: ICD-10-CM

## 2022-09-07 ENCOUNTER — PATIENT MESSAGE (OUTPATIENT)
Dept: FAMILY MEDICINE CLINIC | Age: 62
End: 2022-09-07

## 2022-09-07 RX ORDER — ARMODAFINIL 250 MG/1
TABLET ORAL
Qty: 90 TABLET | Refills: 0 | Status: SHIPPED | OUTPATIENT
Start: 2022-09-07

## 2022-09-07 RX ORDER — IBUPROFEN 800 MG/1
800 TABLET ORAL
Qty: 360 TABLET | Refills: 1 | Status: SHIPPED | OUTPATIENT
Start: 2022-09-07

## 2022-09-07 RX ORDER — IBUPROFEN 800 MG/1
800 TABLET ORAL
Qty: 360 TABLET | Refills: 1 | Status: SHIPPED | OUTPATIENT
Start: 2022-09-07 | End: 2022-09-07 | Stop reason: SDUPTHER

## 2022-09-07 NOTE — TELEPHONE ENCOUNTER
I can do a home health referral but that is not permanent. It is done every 90 days because there may come a time when home health has fulfilled all their goals and will discharge you.   You can let me know if you want a home health referral

## 2022-09-08 ENCOUNTER — TELEPHONE (OUTPATIENT)
Dept: FAMILY MEDICINE CLINIC | Age: 62
End: 2022-09-08

## 2022-09-08 NOTE — TELEPHONE ENCOUNTER
Reason for call: [facility] calling--they really wanted to accept the pt as a new pt there, however because of the way her insurance is set up, they cannot accept her because they do not participate with the insurance. They don't have a problem taking Medicare part A/B, however, medicare is listed as her secondary and [facility] is primary. They stated if we have any questions we can give them a call, and she would have to find somewhere else if [facility] is her primary unfortunately.     Is this a new problem: yes     Date of last appointment:  8/19/2022     Can we respond via [employer]: no    Best call back number: [phone]

## 2022-09-11 ENCOUNTER — PATIENT MESSAGE (OUTPATIENT)
Dept: FAMILY MEDICINE CLINIC | Age: 62
End: 2022-09-11

## 2022-09-11 DIAGNOSIS — G93.32 CFS (CHRONIC FATIGUE SYNDROME): Primary | ICD-10-CM

## 2022-09-11 DIAGNOSIS — M79.7 FIBROMYALGIA: ICD-10-CM

## 2022-09-11 DIAGNOSIS — M19.90 OSTEOARTHRITIS, UNSPECIFIED OSTEOARTHRITIS TYPE, UNSPECIFIED SITE: ICD-10-CM

## 2022-09-12 ENCOUNTER — TELEPHONE (OUTPATIENT)
Dept: FAMILY MEDICINE CLINIC | Age: 62
End: 2022-09-12

## 2022-09-12 NOTE — TELEPHONE ENCOUNTER
Sent patient a My Chart message, asking which is her primary insurance. [facility] states they cannot service her if [employer] is primary. [address] stated that they do not go into [facility], that is out of their service area. Once I know which is primary, I can determine who we can see if they can service her.

## 2022-09-12 NOTE — TELEPHONE ENCOUNTER
Sent patient a My Chart message, asking which is her primary insurance. [facility] states they cannot service her if [facility] is primary. Once I know which is primary, I can determine who we can see if they can service her.

## 2022-09-12 NOTE — TELEPHONE ENCOUNTER
In regards to referral they do not go into [facility], that is out of their service area.

## 2022-09-16 DIAGNOSIS — M79.7 FIBROMYALGIA: ICD-10-CM

## 2022-09-17 RX ORDER — PREGABALIN 100 MG/1
CAPSULE ORAL
Qty: 180 CAPSULE | Refills: 0 | Status: SHIPPED | OUTPATIENT
Start: 2022-09-17

## 2022-09-17 RX ORDER — LEVOTHYROXINE SODIUM 75 UG/1
TABLET ORAL
Qty: 90 TABLET | Refills: 1 | Status: SHIPPED | OUTPATIENT
Start: 2022-09-17

## 2022-09-21 DIAGNOSIS — B37.31 CANDIDIASIS OF VULVA AND VAGINA: ICD-10-CM

## 2022-09-22 RX ORDER — NYSTATIN 100000 [USP'U]/ML
SUSPENSION ORAL
Qty: 540 ML | Refills: 0 | Status: SHIPPED | OUTPATIENT
Start: 2022-09-22

## 2022-09-23 ENCOUNTER — PATIENT MESSAGE (OUTPATIENT)
Dept: FAMILY MEDICINE CLINIC | Age: 62
End: 2022-09-23

## 2022-09-23 NOTE — TELEPHONE ENCOUNTER
From: Juani Harman  To: Jose F Roque NP  Sent: 9/23/2022 9:20 AM EDT  Subject: Duloxetine    And lastly I think, were you able to write the duloxetine prescription in 30 mg capsules so I can start tittering down?      Abdifatah Whitman

## 2022-09-25 RX ORDER — DULOXETIN HYDROCHLORIDE 30 MG/1
30 CAPSULE, DELAYED RELEASE ORAL DAILY
Qty: 30 CAPSULE | Refills: 0 | Status: SHIPPED | OUTPATIENT
Start: 2022-09-25

## 2022-09-26 NOTE — LETTER
1/11/2021    Patient: Kritsina Calvillo   YOB: 1960   Date of Visit: 1/11/2021     Cristina Rocha NP  [address]  [place] [address] [address]  [place] [employer]    Dear Cristina Rocha NP,      Thank you for referring Ms. Hannah Escalera to [facility] for evaluation. My notes for this consultation are attached. If you have questions, please do not hesitate to call me. I look forward to following your patient along with you.       Sincerely,    Robson Lee MD
24-Aug-2021

## 2022-09-30 ENCOUNTER — PATIENT MESSAGE (OUTPATIENT)
Dept: FAMILY MEDICINE CLINIC | Age: 62
End: 2022-09-30

## 2022-09-30 DIAGNOSIS — M79.7 FIBROMYALGIA: ICD-10-CM

## 2022-09-30 DIAGNOSIS — G93.32 CFS (CHRONIC FATIGUE SYNDROME): Primary | ICD-10-CM

## 2022-10-03 NOTE — PROGRESS NOTES
Nancy Gill (: 1960) is a 63 y.o. female, established patient, here for evaluation of the following chief complaint(s):   Follow Up Chronic Condition (Pt has a list of things to talk about)       ASSESSMENT/PLAN:  Below is the assessment and plan developed based on review of pertinent history, labs, studies, and medications. 1. Fibromyalgia  Patient has been referred to neurology and psychology for further evaluation and treatment of her fibromyalgia. Nothing is seem to help. She is becoming further and further debilitated and states that most days she will even come downstairs and can hardly get out of bed. 2. Migraine without status migrainosus, not intractable, unspecified migraine type  Patient has been evaluated by neurology for her migraines but they are still poorly controlled which may also be contributing to her current depression    3. CFS (chronic fatigue syndrome)  Patient has been referred to rheumatology twice and nothing seems to help with her chronic fatigue syndrome. 4. Primary narcolepsy without cataplexy  Patient is taking Nuvigil for this diagnosis and feels that it is helping. We will continue with this medication at the current dosage    5. Chronic maxillary sinusitis  Patient has been evaluated by ENT and found to have a deviated septum. It appears that she is still being followed by ENT and is trying to find a plan that would work for her  6. Severe episode of recurrent major depressive disorder, without psychotic features ([facility] [address]  Patient has been on Cymbalta up to 60 mg. It was her desire to decrease the dosage and so now she is on 30 mg. It does not appear to be helping her depression    7.  Deviated septum  Patient is followed by ENT for her deviated septum and it is still unclear whether he recommends surgery to treat this condition          SUBJECTIVE/OBJECTIVE:  61-year-old female presents with a list of her chronic conditions that seem to be exacerbating for her.  Her chronic conditions include hypertension, hypothyroidism, chronic fatigue syndrome, depression and the biggest one is her fibromyalgia . She feels that her pain issues complicate the other issues that she is having. She states that she has no energy,  she has little interest or pleasure in doing things and she feels that in the future she may need some documentation of her health issues. She has been evaluated by rheumatology but it has not helped. She also has been evaluated by ENT for chronic sinus issues and still is having those issues too. Moreover to get to the bottom of her problems she has been referred to neurology, psychology, and allergist, and pulmonology. Nobody has been able to alleviate her chronic pain or figure out if there is a bigger picture going on. These issues have been going on for quite some time and nothing seems to make them better or worse      Review of Systems   ROS per HPI         Physical Exam  Pulmonary:      Effort: Pulmonary effort is normal.   Neurological:      Mental Status: She is alert and oriented to person, place, and time. Mental status is at baseline. Devi Nugent, was evaluated through a synchronous (real-time) audio-video encounter. The patient (or guardian if applicable) is aware that this is a billable service, which includes applicable co-pays. This Virtual Visit was conducted with patient's (and/or legal guardian's) consent. The visit was conducted pursuant to the emergency declaration under the [address] [place] [address], [address] [place] [address] authority and the [employer] and [employer] General Act. Patient identification was verified, and a caregiver was present when appropriate. The patient was located at: Home: [address] [place] [address]  The provider was located at: Home: [unfilled]       An electronic signature was used to authenticate this note.   -- Flakita Lopez [address] [facility]

## 2022-10-04 ENCOUNTER — HOME HEALTH ADMISSION (OUTPATIENT)
Dept: HOME HEALTH SERVICES | Facility: HOME HEALTH | Age: 62
End: 2022-10-04
Payer: COMMERCIAL

## 2022-10-04 ENCOUNTER — PATIENT MESSAGE (OUTPATIENT)
Dept: FAMILY MEDICINE CLINIC | Age: 62
End: 2022-10-04

## 2022-10-04 NOTE — TELEPHONE ENCOUNTER
From: Boris Zhao  To: Ponce Cheng NP  Sent: 10/4/2022 2:23 PM EDT  Subject: No Abby Silvina been going back and forth about LDN, low dose naproxen, but its low dose naltrexone. Oops? (She says, sheepishly)    Good news; heard today that [employer] will be providing the [facility]. I spent a day and half alternately leaking tears and sobbing when it looked like I wasnt going to be able to get it. I am so beyond relieved. Ty for everything! Im thinking once it starts I wont have to pester you as much! Silver linings playbook.

## 2022-10-05 ENCOUNTER — PATIENT MESSAGE (OUTPATIENT)
Dept: FAMILY MEDICINE CLINIC | Age: 62
End: 2022-10-05

## 2022-10-05 DIAGNOSIS — G93.32 CFS (CHRONIC FATIGUE SYNDROME): ICD-10-CM

## 2022-10-05 DIAGNOSIS — E03.9 ACQUIRED HYPOTHYROIDISM: ICD-10-CM

## 2022-10-05 DIAGNOSIS — E55.9 VITAMIN D DEFICIENCY: ICD-10-CM

## 2022-10-05 DIAGNOSIS — F33.2 SEVERE EPISODE OF RECURRENT MAJOR DEPRESSIVE DISORDER, WITHOUT PSYCHOTIC FEATURES (HCC): Primary | ICD-10-CM

## 2022-10-05 DIAGNOSIS — Z13.1 DIABETES MELLITUS SCREENING: ICD-10-CM

## 2022-10-05 DIAGNOSIS — R79.89 OTHER SPECIFIED ABNORMAL FINDINGS OF BLOOD CHEMISTRY: ICD-10-CM

## 2022-10-05 DIAGNOSIS — D89.89 AUTOIMMUNE DISORDER (HCC): ICD-10-CM

## 2022-10-05 DIAGNOSIS — E66.01 SEVERE OBESITY (BMI 35.0-39.9) WITH COMORBIDITY (HCC): ICD-10-CM

## 2022-10-05 DIAGNOSIS — E78.00 HYPERCHOLESTEROLEMIA: ICD-10-CM

## 2022-10-05 DIAGNOSIS — M79.7 FIBROMYALGIA: ICD-10-CM

## 2022-10-05 DIAGNOSIS — I10 PRIMARY HYPERTENSION: ICD-10-CM

## 2022-10-10 ENCOUNTER — HOME CARE VISIT (OUTPATIENT)
Dept: SCHEDULING | Facility: HOME HEALTH | Age: 62
End: 2022-10-10

## 2022-10-10 VITALS
OXYGEN SATURATION: 95 % | SYSTOLIC BLOOD PRESSURE: 120 MMHG | DIASTOLIC BLOOD PRESSURE: 78 MMHG | HEART RATE: 74 BPM | TEMPERATURE: 98.3 F | RESPIRATION RATE: 18 BRPM

## 2022-10-10 PROCEDURE — 400018 HH-NO PAY CLAIM PROCEDURE

## 2022-10-10 PROCEDURE — G0299 HHS/HOSPICE OF RN EA 15 MIN: HCPCS

## 2022-10-11 ENCOUNTER — HOME CARE VISIT (OUTPATIENT)
Dept: HOME HEALTH SERVICES | Facility: HOME HEALTH | Age: 62
End: 2022-10-11

## 2022-10-12 ENCOUNTER — HOME CARE VISIT (OUTPATIENT)
Dept: HOME HEALTH SERVICES | Facility: HOME HEALTH | Age: 62
End: 2022-10-12

## 2022-10-17 ENCOUNTER — HOME CARE VISIT (OUTPATIENT)
Dept: HOME HEALTH SERVICES | Facility: HOME HEALTH | Age: 62
End: 2022-10-17

## 2022-10-18 NOTE — TELEPHONE ENCOUNTER
Psychology referral to [facility] and orders sent to [address]
So I got a note from PT and it does not appear that you are engaging.   Another order will be needed but there is no point if you are not going to engage (my chart message)
family/patient

## 2022-10-19 ENCOUNTER — PATIENT MESSAGE (OUTPATIENT)
Dept: FAMILY MEDICINE CLINIC | Age: 62
End: 2022-10-19

## 2022-10-19 NOTE — TELEPHONE ENCOUNTER
TC to [facility] to call in 2 mg Naltraxone. Spke with Joanna.   They will let you know what it costs

## 2022-11-11 DIAGNOSIS — E78.00 HYPERCHOLESTEROLEMIA: ICD-10-CM

## 2022-11-11 RX ORDER — TRAZODONE HYDROCHLORIDE 100 MG/1
TABLET ORAL
Qty: 360 TABLET | Refills: 1 | Status: SHIPPED | OUTPATIENT
Start: 2022-11-11

## 2022-11-11 RX ORDER — ATORVASTATIN CALCIUM 10 MG/1
TABLET, FILM COATED ORAL
Qty: 90 TABLET | Refills: 1 | Status: SHIPPED | OUTPATIENT
Start: 2022-11-11

## 2022-11-23 ENCOUNTER — TELEPHONE (OUTPATIENT)
Dept: FAMILY MEDICINE CLINIC | Age: 62
End: 2022-11-23

## 2022-11-23 NOTE — TELEPHONE ENCOUNTER
Patient needs Naltrexone low dose refilled at the compound pharmacy.   The pharmacy will fax us a request.  Patient's  said we refilled this a month ago but I  do not see in her medication list.

## 2022-12-03 ENCOUNTER — TELEPHONE (OUTPATIENT)
Dept: FAMILY MEDICINE CLINIC | Age: 62
End: 2022-12-03

## 2022-12-03 NOTE — TELEPHONE ENCOUNTER
Call received 12/3/2022 1237- patient called complaining of coughing, wheezing, chest tightness, and headache. Requesting steroids and antibiotics. Advised to be evaluated at [facility] to ensure these are appropriate for her illness.

## 2022-12-04 ENCOUNTER — PATIENT MESSAGE (OUTPATIENT)
Dept: FAMILY MEDICINE CLINIC | Age: 62
End: 2022-12-04

## 2022-12-05 RX ORDER — LEVOFLOXACIN 500 MG/1
500 TABLET, FILM COATED ORAL DAILY
Qty: 5 TABLET | Refills: 0 | Status: SHIPPED | OUTPATIENT
Start: 2022-12-05 | End: 2022-12-15

## 2022-12-05 RX ORDER — PREDNISONE 10 MG/1
10 TABLET ORAL
Qty: 5 TABLET | Refills: 0 | Status: SHIPPED | OUTPATIENT
Start: 2022-12-05

## 2022-12-06 ENCOUNTER — TELEPHONE (OUTPATIENT)
Dept: FAMILY MEDICINE CLINIC | Age: 62
End: 2022-12-06

## 2022-12-06 NOTE — TELEPHONE ENCOUNTER
Pharmacy received 2 sets of directions for this patients prescription of Levaquin. Please call the pharmacy and clarify if patient is to take medication for 10 days or 5 days. Patient would like to  today.  Pharmacy phone # [phone]

## 2022-12-15 ENCOUNTER — PATIENT MESSAGE (OUTPATIENT)
Dept: FAMILY MEDICINE CLINIC | Age: 62
End: 2022-12-15

## 2022-12-15 DIAGNOSIS — G93.32 CFS (CHRONIC FATIGUE SYNDROME): ICD-10-CM

## 2022-12-15 RX ORDER — PREDNISONE 10 MG/1
10 TABLET ORAL
Qty: 5 TABLET | Refills: 0 | OUTPATIENT
Start: 2022-12-15

## 2022-12-15 RX ORDER — LEVOFLOXACIN 500 MG/1
500 TABLET, FILM COATED ORAL DAILY
Qty: 5 TABLET | Refills: 0 | OUTPATIENT
Start: 2022-12-15 | End: 2022-12-25

## 2022-12-15 NOTE — TELEPHONE ENCOUNTER
From: Lizabeth Romero  To: Jefrfey Sierra NP  Sent: 12/15/2022 4:30 PM EST  Subject: Not today    Ill try to come by tomorrow.  Yariel Mark is stuck late at work all week thanks

## 2022-12-18 DIAGNOSIS — M79.7 FIBROMYALGIA: ICD-10-CM

## 2022-12-19 DIAGNOSIS — M79.7 FIBROMYALGIA: ICD-10-CM

## 2022-12-20 RX ORDER — PREGABALIN 100 MG/1
CAPSULE ORAL
Qty: 180 CAPSULE | Refills: 0 | OUTPATIENT
Start: 2022-12-20

## 2022-12-20 RX ORDER — PREGABALIN 100 MG/1
100 CAPSULE ORAL 2 TIMES DAILY
Qty: 180 CAPSULE | Refills: 0 | Status: SHIPPED | OUTPATIENT
Start: 2022-12-20

## 2022-12-29 ENCOUNTER — TELEPHONE (OUTPATIENT)
Dept: FAMILY MEDICINE CLINIC | Age: 62
End: 2022-12-29

## 2022-12-29 RX ORDER — DOXYCYCLINE 100 MG/1
100 TABLET ORAL 2 TIMES DAILY
Qty: 14 TABLET | Refills: 0 | Status: SHIPPED | OUTPATIENT
Start: 2022-12-29 | End: 2023-01-05

## 2022-12-29 RX ORDER — BENZONATATE 200 MG/1
200 CAPSULE ORAL
Qty: 90 CAPSULE | Refills: 0 | Status: SHIPPED | OUTPATIENT
Start: 2022-12-29

## 2022-12-29 RX ORDER — AMOXICILLIN 500 MG/1
500 CAPSULE ORAL 2 TIMES DAILY
Qty: 14 CAPSULE | Refills: 0 | Status: SHIPPED | OUTPATIENT
Start: 2022-12-29 | End: 2022-12-29 | Stop reason: SINTOL

## 2022-12-29 NOTE — TELEPHONE ENCOUNTER
Patient was sent Amoxicilllin for sinus infection but she is allergic to this med. Please send something else.

## 2023-01-09 ENCOUNTER — PATIENT MESSAGE (OUTPATIENT)
Dept: FAMILY MEDICINE CLINIC | Age: 63
End: 2023-01-09

## 2023-01-09 DIAGNOSIS — G93.32 CFS (CHRONIC FATIGUE SYNDROME): Primary | ICD-10-CM

## 2023-01-09 RX ORDER — MODAFINIL 200 MG/1
TABLET ORAL
Qty: 60 TABLET | Refills: 0 | Status: SHIPPED | OUTPATIENT
Start: 2023-01-09 | End: 2023-01-11 | Stop reason: SDUPTHER

## 2023-01-09 NOTE — TELEPHONE ENCOUNTER
Patient would like to go back to taking provigil. Last seen [phone].   My chart to patient to let her know that she will need to be seen in February to continue prescribing her controlled substances and for lab work

## 2023-01-11 RX ORDER — MODAFINIL 200 MG/1
TABLET ORAL
Qty: 60 TABLET | Refills: 0 | Status: SHIPPED | OUTPATIENT
Start: 2023-01-11

## 2023-01-16 RX ORDER — MONTELUKAST SODIUM 10 MG/1
TABLET ORAL
Qty: 90 TABLET | Refills: 2 | Status: SHIPPED | OUTPATIENT
Start: 2023-01-16

## 2023-01-16 RX ORDER — IBUPROFEN 800 MG/1
TABLET ORAL
Qty: 360 TABLET | Refills: 1 | Status: SHIPPED | OUTPATIENT
Start: 2023-01-16

## 2023-01-16 RX ORDER — LEVOTHYROXINE SODIUM 75 UG/1
TABLET ORAL
Qty: 90 TABLET | Refills: 1 | Status: SHIPPED | OUTPATIENT
Start: 2023-01-16

## 2023-02-11 ENCOUNTER — PATIENT MESSAGE (OUTPATIENT)
Dept: FAMILY MEDICINE CLINIC | Age: 63
End: 2023-02-11

## 2023-02-11 DIAGNOSIS — R53.83 FATIGUE, UNSPECIFIED TYPE: Primary | ICD-10-CM

## 2023-02-14 RX ORDER — LEVOTHYROXINE SODIUM 75 UG/1
TABLET ORAL
Qty: 90 TABLET | Refills: 1 | Status: SHIPPED | OUTPATIENT
Start: 2023-02-14

## 2023-02-14 RX ORDER — BENZONATATE 200 MG/1
CAPSULE ORAL
Qty: 90 CAPSULE | Refills: 0 | Status: SHIPPED | OUTPATIENT
Start: 2023-02-14

## 2023-02-14 RX ORDER — DICLOFENAC SODIUM 75 MG/1
TABLET, DELAYED RELEASE ORAL
Qty: 180 TABLET | Refills: 1 | Status: SHIPPED | OUTPATIENT
Start: 2023-02-14

## 2023-02-15 ENCOUNTER — OFFICE VISIT (OUTPATIENT)
Dept: FAMILY MEDICINE CLINIC | Age: 63
End: 2023-02-15
Payer: MEDICARE

## 2023-02-15 VITALS
RESPIRATION RATE: 16 BRPM | HEIGHT: 65 IN | HEART RATE: 84 BPM | TEMPERATURE: 97.2 F | SYSTOLIC BLOOD PRESSURE: 136 MMHG | DIASTOLIC BLOOD PRESSURE: 92 MMHG | OXYGEN SATURATION: 97 % | WEIGHT: 264 LBS | BODY MASS INDEX: 43.99 KG/M2

## 2023-02-15 DIAGNOSIS — F33.2 SEVERE EPISODE OF RECURRENT MAJOR DEPRESSIVE DISORDER, WITHOUT PSYCHOTIC FEATURES (HCC): ICD-10-CM

## 2023-02-15 DIAGNOSIS — Z91.81 HISTORY OF FALL: ICD-10-CM

## 2023-02-15 DIAGNOSIS — G93.32 CFS (CHRONIC FATIGUE SYNDROME): ICD-10-CM

## 2023-02-15 DIAGNOSIS — M25.561 CHRONIC PAIN OF BOTH KNEES: ICD-10-CM

## 2023-02-15 DIAGNOSIS — G89.29 CHRONIC PAIN OF RIGHT ANKLE: ICD-10-CM

## 2023-02-15 DIAGNOSIS — L71.9 ROSACEA: ICD-10-CM

## 2023-02-15 DIAGNOSIS — M79.642 PAIN IN BOTH HANDS: ICD-10-CM

## 2023-02-15 DIAGNOSIS — Z13.29 THYROID DISORDER SCREEN: ICD-10-CM

## 2023-02-15 DIAGNOSIS — M79.641 PAIN IN BOTH HANDS: ICD-10-CM

## 2023-02-15 DIAGNOSIS — E03.9 ACQUIRED HYPOTHYROIDISM: ICD-10-CM

## 2023-02-15 DIAGNOSIS — G89.29 CHRONIC PAIN OF BOTH KNEES: ICD-10-CM

## 2023-02-15 DIAGNOSIS — E66.01 SEVERE OBESITY (BMI 35.0-39.9) WITH COMORBIDITY (HCC): ICD-10-CM

## 2023-02-15 DIAGNOSIS — M79.641 RIGHT HAND PAIN: ICD-10-CM

## 2023-02-15 DIAGNOSIS — M25.562 CHRONIC PAIN OF BOTH KNEES: ICD-10-CM

## 2023-02-15 DIAGNOSIS — I10 HYPERTENSION, UNSPECIFIED TYPE: ICD-10-CM

## 2023-02-15 DIAGNOSIS — M79.7 FIBROMYALGIA: ICD-10-CM

## 2023-02-15 DIAGNOSIS — E78.00 HYPERCHOLESTEROLEMIA: ICD-10-CM

## 2023-02-15 DIAGNOSIS — E55.9 VITAMIN D DEFICIENCY: ICD-10-CM

## 2023-02-15 DIAGNOSIS — Z13.1 DIABETES MELLITUS SCREENING: ICD-10-CM

## 2023-02-15 DIAGNOSIS — R05.9 COUGH, UNSPECIFIED TYPE: ICD-10-CM

## 2023-02-15 DIAGNOSIS — G47.00 INSOMNIA, UNSPECIFIED TYPE: ICD-10-CM

## 2023-02-15 DIAGNOSIS — E06.3 HASHIMOTO'S THYROIDITIS: ICD-10-CM

## 2023-02-15 DIAGNOSIS — F41.9 ANXIETY: Primary | ICD-10-CM

## 2023-02-15 DIAGNOSIS — Z13.0 SCREENING FOR DEFICIENCY ANEMIA: ICD-10-CM

## 2023-02-15 DIAGNOSIS — M25.571 CHRONIC PAIN OF RIGHT ANKLE: ICD-10-CM

## 2023-02-15 DIAGNOSIS — Z13.228 ENCOUNTER FOR SCREENING FOR OTHER METABOLIC DISORDERS: ICD-10-CM

## 2023-02-15 RX ORDER — DEXTROMETHORPHAN HYDROBROMIDE, BUPROPION HYDROCHLORIDE 105; 45 MG/1; MG/1
1 TABLET, MULTILAYER, EXTENDED RELEASE ORAL DAILY
Qty: 30 TABLET | Refills: 0 | Status: SHIPPED | OUTPATIENT
Start: 2023-02-15

## 2023-02-15 NOTE — PROGRESS NOTES
Subjective  Chief Complaint   Patient presents with    Annual Wellness Visit    Labs     HPI:  Rafael Park is a 63 y.o. female. 70-year-old female presents for annual wellness with physical and fasting labs. She is accompanied by her . Her health screenings are as documented in the EMR. Her diagnoses include hypertension, hypothyroidism, Hashimoto's thyroiditis, rosacea, chronic fatigue syndrome, obstructive sleep apnea, mild intermittent asthma, osteoarthritis, fibromyalgia, depression, hypercholesterolemia, severe obesity, anxiety and depression, and seasonal allergies. Because of her memory issues patient is intermittently compliant with her medication regimen. She is accompanied by her  today. She does not monitor her blood pressure at home. Efforts to get home health coming to monitor her hampered by the fact that she does not need skilled nursing but she does need occupational therapy and physical therapy. She is not followed by rheumatology despite her severe autoimmune disease disorders. Patient states she rarely leaves her bedroom. Because of this her anxiety and depression is severe. She endorses multijoint pain so we will be doing studies to get additional clinical information. Ordering new medication to see if we can ameliorate patient's anxiety.   Previous use of SSRIs has been failed updated histamine and tryptase    Past Medical History:   Diagnosis Date    Acquired talipes planus, unspecified laterality     Anal spasm 07/14/2021    Anemia     Anxiety     Bronchitis     Chronic pain     neck and back    Contact dermatitis and eczema due to cause     Depression     Dysphagia     Hashimoto's thyroiditis     Headache     Hypercholesterolemia     Hypertensive disorder     Insomnia     Osteoarthritis     Plantar fasciitis     Seasonal allergic reaction     Trauma      Family History   Problem Relation Age of Onset    COPD Mother     Headache Mother    Denice Lefort Heart Disease Mother     Migraines Mother     Stroke Mother     COPD Father     Heart Attack Father     Headache Father     Heart Disease Father     Migraines Father     Cancer Sister         Rectal akin cancer    Headache Sister     Migraines Sister     Diabetes Brother     Headache Brother     Breast Cancer Paternal Grandmother     Headache Sister     Migraines Sister      Social History     Socioeconomic History    Marital status:      Spouse name: Not on file    Number of children: Not on file    Years of education: Not on file    Highest education level: Not on file   Occupational History    Not on file   Tobacco Use    Smoking status: Never    Smokeless tobacco: Never   Vaping Use    Vaping Use: Some days    Substances: CBD   Substance and Sexual Activity    Alcohol use:  Yes     Alcohol/week: 1.0 standard drink     Types: 1 Glasses of wine per week    Drug use: Yes     Frequency: 10.0 times per week     Types: Marijuana     Comment: medical    Sexual activity: Yes     Partners: Male     Birth control/protection: None   Other Topics Concern    Not on file   Social History Narrative    Not on file     Social Determinants of Health     Financial Resource Strain: Medium Risk    Difficulty of Paying Living Expenses: Somewhat hard   Food Insecurity: No Food Insecurity    Worried About Running Out of Food in the Last Year: Never true    Colt of Food in the Last Year: Never true   Transportation Needs: Not on file   Physical Activity: Not on file   Stress: Not on file   Social Connections: Not on file   Intimate Partner Violence: Not on file   Housing Stability: Not on file     Current Outpatient Medications on File Prior to Visit   Medication Sig Dispense Refill    levothyroxine (SYNTHROID) 75 mcg tablet TAKE ONE TABLET BY MOUTH ONE TIME DAILY 90 Tablet 1    benzonatate (TESSALON) 200 mg capsule TAKE ONE CAPSULE BY MOUTH THREE TIMES A DAY AS NEEDED FOR COUGH 90 Capsule 0  diclofenac EC (VOLTAREN) 75 mg EC tablet TAKE ONE TABLET BY MOUTH TWICE A  Tablet 1    montelukast (SINGULAIR) 10 mg tablet TAKE ONE TABLET BY MOUTH ONE TIME DAILY 90 Tablet 2    ibuprofen (MOTRIN) 800 mg tablet TAKE ONE TABLET BY MOUTH EVERY 6 HOURS AS NEEDED FOR PAIN 360 Tablet 1    pregabalin (LYRICA) 100 mg capsule Take 1 Capsule by mouth two (2) times a day. 180 Capsule 0    atorvastatin (LIPITOR) 10 mg tablet TAKE ONE TABLET BY MOUTH ONE TIME DAILY 90 Tablet 1    traZODone (DESYREL) 100 mg tablet TAKE FOUR TABLETS BY MOUTH AT BEDTIME 360 Tablet 1    nystatin (MYCOSTATIN) 100,000 unit/mL suspension TAKE 15 ML BY MOUTH FOUR TIMES DAILY 540 mL 0    rizatriptan (MAXALT-MLT) 10 mg disintegrating tablet Take one tablet by mouth. If the migraine comes back or it is not relieved with the one tablet another may be taken in 2 hours 30 Tablet 1    glycopyrrolate 2 mg tablet Take 1 Tablet by mouth three (3) times daily. 270 Tablet 1    losartan (COZAAR) 100 mg tablet TAKE ONE TABLET BY MOUTH ONE TIME DAILY 90 Tablet 2    fluticasone furoate-vilanteroL (Breo Ellipta) 100-25 mcg/dose inhaler Take 1 Puff by inhalation daily. 60 Each 2    diclofenac (VOLTAREN) 1 % gel Apply  to affected area four (4) times daily. 2 g 2    cetirizine (ZYRTEC) 10 mg tablet Take 10 mg by mouth daily.  NALTREXONE       modafiniL (PROVIGIL) 200 mg tablet Take one tablet by mouth two times a day (Patient not taking: Reported on 2/15/2023) 60 Tablet 0    predniSONE (DELTASONE) 10 mg tablet Take 10 mg by mouth daily (with breakfast). (Patient not taking: Reported on 2/15/2023) 5 Tablet 0     No current facility-administered medications on file prior to visit. Allergies   Allergen Reactions    Methylprednisolone Unknown (comments)     insomnia    Silicone Rash    Penicillins Nausea and Vomiting     ROS  ROS per HPI and past medical history. Objective  Physical Exam  Vitals and nursing note reviewed. Constitutional:       Appearance: She is obese. Cardiovascular:      Rate and Rhythm: Normal rate and regular rhythm. Pulmonary:      Effort: Pulmonary effort is normal.      Breath sounds: Normal breath sounds. Skin:     General: Skin is warm and dry. Neurological:      Mental Status: She is alert and oriented to person, place, and time. Psychiatric:         Mood and Affect: Mood normal.         Behavior: Behavior normal.         Thought Content: Thought content normal.         Judgment: Judgment normal.        Assessment & Plan      ICD-10-CM ICD-9-CM    1. Anxiety  F41.9 300.00       2. Fibromyalgia  M79.7 729.1       3. Acquired hypothyroidism  E03.9 244.9       4. Hashimoto's thyroiditis  E06.3 245.2 HISTAMINE, PLASMA      TRYPTASE      5. Hypercholesterolemia  E78.00 272.0 LIPID PANEL      6. Severe obesity (BMI 35.0-39. 9) with comorbidity ([facility] [address]  E66.01 278.01       7. Vitamin D deficiency  E55.9 268.9 VITAMIN D, 25 HYDROXY      8. Encounter for screening for other metabolic disorders  T72.902 Z42.11 METABOLIC PANEL, COMPREHENSIVE      9. Screening for deficiency anemia  Z13.0 V78.1 CBC WITH AUTOMATED DIFF      10. Diabetes mellitus screening  Z13.1 V77.1       11. Thyroid disorder screen  Z13.29 V77.0 TSH 3RD GENERATION      T4, FREE      12. Chronic pain of both knees  M25.561 719.46 XR KNEE LT MAX 2 VWS    M25.562 338.29 XR KNEE LT MAX 2 VWS    G89.29        13. Pain in both hands  M79.641 729.5 XR CHEST PA LAT    M79.642  XR HAND LT MIN 3 V      XR HAND LT MIN 3 V      XR CHEST PA LAT      14. Cough, unspecified type  R05.9 786.2 XR CHEST PA LAT      XR CHEST PA LAT      15. Chronic pain of right ankle  M25.571 719.47 XR ANKLE RT MIN 3 V    G89.29 338.29 XR ANKLE RT MIN 3 V      16. History of fall  Z91.81 V15.88 XR ANKLE RT MIN 3 V      XR ANKLE RT MIN 3 V      17. Right hand pain  M79.641 729.5 XR HAND RT MIN 3 V      XR HAND RT MIN 3 V      18.  Hypertension, unspecified type  I10 401.9 19. CFS (chronic fatigue syndrome)  G93.32 780.71       20. Rosacea  L71.9 695.3       21. Severe episode of recurrent major depressive disorder, without psychotic features ([facility]  F33.2 296.33       22. Insomnia, unspecified type  G47.00 780.52         Diagnoses and all orders for this visit:    1. Anxiety  Ordering new medication to see if we can improve patients anxiety and depression. Medications ordered in the past have all failed. Trying a ketamine-wellbutrin med. 2. Fibromyalgia  Patient not followed by rheumatology for this condition. Patient's inability to leave the house is not helpful to her ability to be treated    3. Acquired hypothyroidism  Obtaining updated TSH and T4 for trending and will make treatment decisions when I get the results    4. Hashimoto's thyroiditis  -     HISTAMINE, PLASMA  -     TRYPTASE  Obtaining histamine and tryptase and will make treatment or referral decisions when I get the results    5. Hypercholesterolemia  -     LIPID PANEL  Obtaining updated lipid panel for trending and will make treatment decisions when I get the results      6. Severe obesity (BMI 35.0-39. 9) with comorbidity ([facility]  Any discussion of weight loss will need to be postponed until patient is willing to do any movement. She is hampered by chronic, intractable pain      7. Vitamin D deficiency  -     VITAMIN D, 25 HYDROXY  Obtaining updated vitamin D for trending and will make supplementation decisions when I get the results    8. Encounter for screening for other metabolic disorders  -     METABOLIC PANEL, COMPREHENSIVE  Obtaining updated CMP for trending and will make treatment decisions when I mylene the results    9. Screening for deficiency anemia  -     CBC WITH AUTOMATED DIFF  Obtaining updated CBC for trending and will make treatment decisions when I mylene the results    10.  Diabetes mellitus screening  Obtaining updated A1c for trending and will make treatment decisions when I mylene the results    11. Thyroid disorder screen  -     TSH 3RD GENERATION  -     T4, FREE  Obtaining updated TSH and T4 for trending and will make treatment decisions when I mylene the results    12. Chronic pain of both knees  -     XR KNEE LT MAX 2 VWS; Future  Obtaining radiological studies for additional clinical information and will make treatment decisions when I get the results. Referrals may not be successful secondary to patient's chronic pain. 13. Pain in both hands  -     XR CHEST PA LAT; Future  -     XR HAND LT MIN 3 V; Future\  Xray of chest and hand for additional clinical information and will make treatment decisions when I get the results    14. Cough, unspecified type  -     XR CHEST PA LAT; Future  Xray of chest for additional clinical information and will make treatment decisions when I get the results    15. Chronic pain of right ankle  -     XR ANKLE RT MIN 3 V; Future  Obtaining xray of right ankle for additional clinical information and will make treatment decisions when I get the results    16. History of fall  -     XR ANKLE RT MIN 3 V; Future  Obtaining xray of right ankle for additional clinical information and will make treatment decisions when I get the results      17. Right hand pain  -     XR HAND RT MIN 3 V; Future  Xray right hand for additional clinical information and will make treatment decisions when I get the results    18. Hypertension, unspecified type  Patient has chronic pain issues and I am trying to ascertain what is chronic pain and actual BP issues    19. CFS (chronic fatigue syndrome)  Patient is not followed by rheumatology for this condition and the plan is to manage until I am able to get her to a rheumatology    20. Rosacea  Patient is asymptomatic at this time. Will treat with doxycycline with exacerbation    21.  Severe episode of recurrent major depressive disorder, without psychotic features ([facility] [address]  Will start new depression med with hope of this improving patient's depression and anxitety    22. Insomnia, unspecified type  Will have to postpone treatment of her insomnia until I am able to adequately treat her anxiety and depression. There may be spontaneous resolution with resolution of this problem    Other orders  -     dextromethorphan-bupropion (Auvelity)  mg TbIE; Take 1 Tablet by mouth daily.       Candido Tang, NP

## 2023-02-15 NOTE — PROGRESS NOTES
Chief Complaint   Patient presents with    Follow Up Chronic Condition     6mth controlled substance     3 most recent PHQ Screens 2/15/2023   Little interest or pleasure in doing things Nearly every day   Feeling down, depressed, irritable, or hopeless Nearly every day   Total Score PHQ 2 6   Trouble falling or staying asleep, or sleeping too much Nearly every day   Feeling tired or having little energy Nearly every day   Poor appetite, weight loss, or overeating Nearly every day   Feeling bad about yourself - or that you are a failure or have let yourself or your family down Nearly every day   Trouble concentrating on things such as school, work, reading, or watching TV Nearly every day   Moving or speaking so slowly that other people could have noticed; or the opposite being so fidgety that others notice Nearly every day   Thoughts of being better off dead, or hurting yourself in some way Nearly every day   PHQ 9 Score 27   How difficult have these problems made it for you to do your work, take care of your home and get along with others Extremely difficult     1. \"Have you been to the ER, urgent care clinic since your last visit? Hospitalized since your last visit? \" No    2. \"Have you seen or consulted any other health care providers outside of the [address] [place] [address] since your last visit? \" No     3. For patients aged 39-70: Has the patient had a colonoscopy / FIT/ Cologuard? No      If the patient is female:    4. For patients aged 41-77: Has the patient had a mammogram within the past 2 years? Yes - no Care Gap present      5. For patients aged 21-65: Has the patient had a pap smear?  Yes - no Care Gap present  Visit Vitals  BP (!) 136/92 (BP 1 Location: Left upper arm, BP Patient Position: Sitting, BP Cuff Size: Large adult)   Pulse 84   Temp 97.2 °F (36.2 °C) (Temporal)   Resp 16   Ht 5' 5\" (1.651 m)   Wt 264 lb (119.7 kg)   SpO2 97%   BMI 43.93 kg/m²     Fall Risk Assessment, last 12 mths 12/30/2021 Able to walk? Yes   Fall in past 12 months? 1   Do you feel unsteady? 1   Are you worried about falling 0   Fall with injury?  0

## 2023-02-16 ENCOUNTER — HOSPITAL ENCOUNTER (EMERGENCY)
Age: 63
Discharge: LEFT BEFORE DISCHARGE | End: 2023-02-16
Attending: EMERGENCY MEDICINE
Payer: COMMERCIAL

## 2023-02-16 ENCOUNTER — APPOINTMENT (OUTPATIENT)
Dept: GENERAL RADIOLOGY | Age: 63
End: 2023-02-16
Attending: NURSE PRACTITIONER
Payer: COMMERCIAL

## 2023-02-16 VITALS
TEMPERATURE: 98.5 F | OXYGEN SATURATION: 99 % | DIASTOLIC BLOOD PRESSURE: 83 MMHG | HEART RATE: 84 BPM | RESPIRATION RATE: 14 BRPM | SYSTOLIC BLOOD PRESSURE: 155 MMHG

## 2023-02-16 LAB
ALBUMIN SERPL-MCNC: 4.2 G/DL (ref 3.5–5.2)
ALBUMIN/GLOB SERPL: 1.6 (ref 1.1–2.2)
ALP SERPL-CCNC: 97 U/L (ref 35–104)
ALT SERPL-CCNC: 48 U/L (ref 10–35)
AMPHET UR QL SCN: POSITIVE
ANION GAP SERPL CALC-SCNC: 11 MMOL/L (ref 5–15)
APAP SERPL-MCNC: <5 UG/ML (ref 10–30)
APPEARANCE UR: CLEAR
AST SERPL-CCNC: 32 U/L (ref 10–35)
BACTERIA URNS QL MICRO: ABNORMAL /HPF
BARBITURATES UR QL SCN: NEGATIVE
BASOPHILS # BLD: 0.1 K/UL (ref 0–1)
BASOPHILS NFR BLD: 1 % (ref 0–1)
BENZODIAZ UR QL: NEGATIVE
BILIRUB SERPL-MCNC: 0.3 MG/DL (ref 0.2–1)
BILIRUB UR QL: NEGATIVE
BUN SERPL-MCNC: 15 MG/DL (ref 8–23)
BUN/CREAT SERPL: 24 (ref 12–20)
CALCIUM SERPL-MCNC: 9.3 MG/DL (ref 8.8–10.2)
CANNABINOIDS UR QL SCN: NEGATIVE
CHLORIDE SERPL-SCNC: 103 MMOL/L (ref 98–107)
CO2 SERPL-SCNC: 26 MMOL/L (ref 22–29)
COCAINE UR QL SCN: NEGATIVE
COLOR UR: ABNORMAL
COMMENT, HOLDF: NORMAL
CREAT SERPL-MCNC: 0.63 MG/DL (ref 0.5–0.9)
DIFFERENTIAL METHOD BLD: ABNORMAL
DRUG SCRN COMMENT,DRGCM: ABNORMAL
EOSINOPHIL # BLD: 0.4 K/UL (ref 0–0.4)
EOSINOPHIL NFR BLD: 4 %
EPITH CASTS URNS QL MICRO: ABNORMAL /LPF
ERYTHROCYTE [DISTWIDTH] IN BLOOD BY AUTOMATED COUNT: 14.2 % (ref 11.5–14.5)
ETHANOL SERPL-MCNC: <10 MG/DL (ref 0–10)
GLOBULIN SER CALC-MCNC: 2.7 G/DL (ref 2–4)
GLUCOSE SERPL-MCNC: 96 MG/DL (ref 65–100)
GLUCOSE UR STRIP.AUTO-MCNC: NEGATIVE MG/DL
HCT VFR BLD AUTO: 39.7 % (ref 35–47)
HGB BLD-MCNC: 13.4 G/DL (ref 11.5–16)
HGB UR QL STRIP: ABNORMAL
IMM GRANULOCYTES # BLD AUTO: 0 K/UL (ref 0–0.04)
IMM GRANULOCYTES NFR BLD AUTO: 0 % (ref 0–0.5)
KETONES UR QL STRIP.AUTO: NEGATIVE MG/DL
LEUKOCYTE ESTERASE UR QL STRIP.AUTO: NEGATIVE
LYMPHOCYTES # BLD: 1.8 K/UL (ref 0.8–3.5)
LYMPHOCYTES NFR BLD: 21 % (ref 12–49)
MCH RBC QN AUTO: 29.8 PG (ref 26–34)
MCHC RBC AUTO-ENTMCNC: 33.8 G/DL (ref 30–36.5)
MCV RBC AUTO: 88.4 FL (ref 80–99)
METHADONE UR QL: NEGATIVE
MONOCYTES # BLD: 0.7 K/UL (ref 0–1)
MONOCYTES NFR BLD: 8 % (ref 5–13)
NEUTS SEG # BLD: 5.9 K/UL (ref 1.8–8)
NEUTS SEG NFR BLD: 66 % (ref 32–75)
NITRITE UR QL STRIP.AUTO: NEGATIVE
NRBC # BLD: 0 K/UL (ref 0–0.01)
NRBC BLD-RTO: 0 PER 100 WBC
OPIATES UR QL: NEGATIVE
PCP UR QL: NEGATIVE
PH UR STRIP: 6 (ref 5–8)
PLATELET # BLD AUTO: 251 K/UL (ref 150–400)
PMV BLD AUTO: 8.7 FL (ref 8.9–12.9)
POTASSIUM SERPL-SCNC: 4.2 MMOL/L (ref 3.5–5.1)
PROT SERPL-MCNC: 6.9 G/DL (ref 6.4–8.3)
PROT UR STRIP-MCNC: NEGATIVE MG/DL
RBC # BLD AUTO: 4.49 M/UL (ref 3.8–5.2)
RBC #/AREA URNS HPF: ABNORMAL /HPF
SALICYLATES SERPL-MCNC: <0.3 MG/DL (ref 3–10)
SAMPLES BEING HELD,HOLD: NORMAL
SODIUM SERPL-SCNC: 140 MMOL/L (ref 136–145)
SP GR UR REFRACTOMETRY: 1.01 (ref 1–1.03)
UROBILINOGEN UR QL STRIP.AUTO: 0.2 EU/DL (ref 0.2–1)
WBC # BLD AUTO: 8.9 K/UL (ref 3.6–11)
WBC URNS QL MICRO: ABNORMAL /HPF (ref 0–4)

## 2023-02-16 PROCEDURE — 80143 DRUG ASSAY ACETAMINOPHEN: CPT

## 2023-02-16 PROCEDURE — 80053 COMPREHEN METABOLIC PANEL: CPT

## 2023-02-16 PROCEDURE — 81001 URINALYSIS AUTO W/SCOPE: CPT

## 2023-02-16 PROCEDURE — 82077 ASSAY SPEC XCP UR&BREATH IA: CPT

## 2023-02-16 PROCEDURE — 80307 DRUG TEST PRSMV CHEM ANLYZR: CPT

## 2023-02-16 PROCEDURE — 80179 DRUG ASSAY SALICYLATE: CPT

## 2023-02-16 PROCEDURE — 85025 COMPLETE CBC W/AUTO DIFF WBC: CPT

## 2023-02-16 PROCEDURE — 36415 COLL VENOUS BLD VENIPUNCTURE: CPT

## 2023-02-16 PROCEDURE — 75810000275 HC EMERGENCY DEPT VISIT NO LEVEL OF CARE

## 2023-02-16 RX ORDER — LIDOCAINE HYDROCHLORIDE 10 MG/ML
10 INJECTION INFILTRATION; PERINEURAL ONCE
Status: DISCONTINUED | OUTPATIENT
Start: 2023-02-16 | End: 2023-02-16 | Stop reason: HOSPADM

## 2023-02-16 NOTE — BSMART NOTE
Comprehensive Assessment Form Part 1      Section I - Disposition    Major Depressive Disorder without psychosis (by hx per pt)      The Medical Doctor to Psychiatrist conference was not completed. The Medical Doctor is in agreement with Psychiatrist disposition because of (reason) pt meeting psychiatric admission criteria. The plan is refer to [facility] for crisis evaluation/TDO. The on-call Psychiatrist consulted was Dr. Araceli Peng. The admitting Psychiatrist will be Dr. Whitney Marie. The admitting Diagnosis is MDD. The Payor source is [facility]  Based on the [facility] Suicide Severity Risk Level  Scale there is HIGH risk for suicide. Based on this assessment the overall risk of suicide is HIGH. The plan will be . Section II - Integrated Summary  Summary:  Per triage, \"Patient arrives to ed via pov after cutting right thigh at home, pt sts she was attempting to hurt herself, pt sts she cuts at home however has never used a razor to do it until today \"because the pain is just unbearable and sometimes I feel like if I could experience pain somewhere else on myself it would make the other pain everywhere else better\" pt stated. Pt sts she has been hospitalized previously for self harm, when questioned if she wanted to kill herself patient sts \"I would love to, I just don't have the courage. \"    Pt is a 63year old female seen face to face at bedside via Teledoc. Pt presented as anxious, euthymic but cooperative. Pt A&Ox4. Pt shared she battles Fibromyalgia pain and she is bed ridden unable to bathe, toilet self and/or move around. Today pt shared she had to wheelchair herself to bathroom but staff witnessed her walking to restroom. Later in the next breath pt stated she can toilet herself by walking to bathroom. Pt uses cpap machine but she did not have with her but felt her  could bring should she be admitted.  Pt when asked if suicidal stated, \"I have a death wish or a wish of death. \" Today pt stated she cut herself bc pain and exhaustion. However, pt stated she used a new method of self harm this time by using a blade but normally she scratches. Pt laughed and stated, \"I cut too deep. \" Pt shared she self harms herself on her shoulders via scratching every 3-4 months and has been doing this as a coping skill the last 3 years. The lacs on her right thigh is estimated to need 10 sutures per attending NP. Pt denied HI and hallucinations. Pt shared she has had x2 previous psychiatric admissions at [facility] bc of SI approximately 8 years ago. Pt shared she has not had therapy in over a year and it has been several years since having outpatient psychiatry services. Pt shared appetite is WNL. Pt shared she has severe insomnia and recently has only be sleeping 3-4 hours. Pt shared her Trazodone is no longer working for sleep. Pt is  and resides with her spouse who she stated was her most supportive person. Pt has no children. Pt is on disability. Pt denied SA but shared last year she was tried on CBD gummies for her Fibromyalgia pain. Pt recommended for admission bc of danger to self/SI and stabilization of mood. Pt declined thus TDO process explained. Pt then stated she would be voluntary and then quickly vacillated back to wanting crisis assessment. Pt reported to have now eloped ER. Nursing staff contacted CPD. The patienthas not demonstrated mental capacity to provide informed consent. The information is given by the patient. The Chief Complaint is vague SI and self harm (cut R thigh requiring sutures). The Precipitant Factors are Fibromyalgia pain. [facility]  The patient has not previously been in restraints. Current Psychiatrist and/or  is no one. Lethality Assessment:    The potential for suicide noted by the following: intent, vague plan, current attempt, ideation, and means .   The potential for homicide is not noted. The patient has not been a perpetrator of sexual or physical abuse. There are not pending charges. The patient is felt to be at risk for self harm or harm to others. The attending nurse was advised the patient needs supervision. Section III - Psychosocial  The patient's overall mood and attitude is euthymic. Feelings of helplessness and hopelessness are observed by current presentation and per pt report. Generalized anxiety is not observed but pt reported hx. Panic is not observed. Phobias are not observed. Obsessive compulsive tendencies are not observed. Section IV - Mental Status Exam  The patient's appearance is bizarre. The patient's behavior shows poor impulse control, shows poor eye contact, and is restless. The patient is oriented to time, place, person and situation. The patient's speech is pressured. The patient's mood is depressed, is anxious, and is sad. The range of affect shows no evidence of impairment. The patient's thought content demonstrates no evidence of impairment. The thought process shows no evidence of impairment. The patient's perception shows no evidence of impairment. The patient's memory shows no evidence of impairment. The patient's appetite shows no evidence of impairment. The patient's sleep has evidence of insomnia. The patient shows no insight. The patient's judgement is psychologically impaired. Section V - Substance Abuse  The patient is not using substances. Section VI - Living Arrangements  The patient is . The patient lives with a spouse. The patient has no children. The patient does plan to return home upon discharge. The patient does not have legal issues pending. The patient's source of income comes from disability. Bahai and cultural practices have not been voiced at this time.     The patient's greatest support comes from spouse and this person will be involved with the treatment. The patient has not been in an event described as horrible or outside the realm of ordinary life experience either currently or in the past.  The patient has not been a victim of sexual/physical abuse. Section VII - Other Areas of Clinical Concern  The highest grade achieved is unknown with the overall quality of school experience being described as unknown. The patient is currently disabled and speaks [place] as a primary language. The patient has no communication impairments affecting communication. The patient's preference for learning can be described as: can read and write adequately.   The patient's hearing is normal.  The patient's vision is normal.      Elba Rizo MS, Resident in [place] [phone]

## 2023-02-16 NOTE — BSMART NOTE
BSMART assessment completed, and suicide risk level noted to be HIGH. Primary Nurse NA and Charge Nurse NA and Physician Karena Parish NP notified. Concerns not observed. Security/Off- has not been notified.

## 2023-02-16 NOTE — ED TRIAGE NOTES
Patient arrives to ed via pov after cutting right thigh at home, pt sts she was attempting to hurt herself, pt sts she cuts at home however has never used a razor to do it until today \"because the pain is just unbearable and sometimes I feel like if I could experience pain somewhere else on myself it would make the other pain everywhere else better\" pt stated. Pt sts she has been hospitalized previously for self harm, when questioned if she wanted to kill herself patient sts \"I would love to, I just don't have the courage\". Pt denies taking any medications aside from her daily meds and PRN anxiety medication. Pt denies being on blood thinners. Pt unsure of when last tetanus shot was.

## 2023-02-16 NOTE — ED NOTES
[facility] Department non-emergent line called for pt running out of hospital, NP Julia Lutz, and multiple other nurses ran after pt to attempt to stop pt, security attempted to stop pt, PIV removed by LAXMI Manjarrez, license plate number obtained and given to PD, description of pt and pt home address given to PD

## 2023-02-17 ENCOUNTER — PATIENT MESSAGE (OUTPATIENT)
Dept: FAMILY MEDICINE CLINIC | Age: 63
End: 2023-02-17

## 2023-02-17 RX ORDER — CYCLOBENZAPRINE HCL 5 MG
5 TABLET ORAL
Qty: 90 TABLET | Refills: 0 | Status: SHIPPED | OUTPATIENT
Start: 2023-02-17

## 2023-02-17 NOTE — TELEPHONE ENCOUNTER
From: Jimenez Hartman  To: Magnus Perea NP  Sent: 2/17/2023 11:05 AM EST  Subject: Question regarding DRUG SCREEN, URINE    I tested positive for amphetamines? What in earth could I be taking that would test as an amphetamine? Omg could it be the trazadone?

## 2023-02-19 PROBLEM — Z13.29 THYROID DISORDER SCREEN: Status: ACTIVE | Noted: 2022-01-02

## 2023-02-19 PROBLEM — Z91.81 HISTORY OF FALL: Status: ACTIVE | Noted: 2023-02-19

## 2023-02-19 PROBLEM — M25.571 CHRONIC PAIN OF RIGHT ANKLE: Status: ACTIVE | Noted: 2023-02-19

## 2023-02-19 PROBLEM — G89.29 CHRONIC PAIN OF RIGHT ANKLE: Status: ACTIVE | Noted: 2023-02-19

## 2023-02-19 PROBLEM — M25.562 CHRONIC PAIN OF BOTH KNEES: Status: ACTIVE | Noted: 2023-02-19

## 2023-02-19 PROBLEM — G89.29 CHRONIC PAIN OF BOTH KNEES: Status: ACTIVE | Noted: 2023-02-19

## 2023-02-19 PROBLEM — M25.561 CHRONIC PAIN OF BOTH KNEES: Status: ACTIVE | Noted: 2023-02-19

## 2023-02-19 PROBLEM — M79.641 RIGHT HAND PAIN: Status: ACTIVE | Noted: 2023-02-19

## 2023-02-19 PROBLEM — M79.642 PAIN IN BOTH HANDS: Status: ACTIVE | Noted: 2023-02-19

## 2023-02-19 PROBLEM — Z13.228 ENCOUNTER FOR SCREENING FOR OTHER METABOLIC DISORDERS: Status: ACTIVE | Noted: 2022-01-02

## 2023-02-19 PROBLEM — E55.9 VITAMIN D DEFICIENCY: Status: ACTIVE | Noted: 2023-02-19

## 2023-02-19 PROBLEM — Z13.1 DIABETES MELLITUS SCREENING: Status: ACTIVE | Noted: 2022-01-02

## 2023-02-19 PROBLEM — Z13.0 SCREENING FOR DEFICIENCY ANEMIA: Status: ACTIVE | Noted: 2022-01-02

## 2023-02-19 PROBLEM — M79.641 PAIN IN BOTH HANDS: Status: ACTIVE | Noted: 2023-02-19

## 2023-02-19 PROBLEM — R05.9 COUGH: Status: ACTIVE | Noted: 2023-02-19

## 2023-03-09 DIAGNOSIS — E78.00 HYPERCHOLESTEROLEMIA: ICD-10-CM

## 2023-03-10 RX ORDER — BENZONATATE 200 MG/1
CAPSULE ORAL
Qty: 90 CAPSULE | Refills: 0 | Status: SHIPPED | OUTPATIENT
Start: 2023-03-10

## 2023-03-10 RX ORDER — MONTELUKAST SODIUM 10 MG/1
TABLET ORAL
Qty: 90 TABLET | Refills: 1 | Status: SHIPPED | OUTPATIENT
Start: 2023-03-10

## 2023-03-10 RX ORDER — ATORVASTATIN CALCIUM 10 MG/1
TABLET, FILM COATED ORAL
Qty: 90 TABLET | Refills: 1 | Status: SHIPPED | OUTPATIENT
Start: 2023-03-10

## 2023-03-10 RX ORDER — TRAZODONE HYDROCHLORIDE 100 MG/1
TABLET ORAL
Qty: 360 TABLET | Refills: 1 | Status: SHIPPED | OUTPATIENT
Start: 2023-03-10

## 2023-03-16 DIAGNOSIS — M79.7 FIBROMYALGIA: ICD-10-CM

## 2023-03-16 RX ORDER — PREGABALIN 100 MG/1
CAPSULE ORAL
Qty: 180 CAPSULE | Refills: 0 | Status: SHIPPED | OUTPATIENT
Start: 2023-03-16

## 2023-03-21 ENCOUNTER — HOSPITAL ENCOUNTER (OUTPATIENT)
Dept: GENERAL RADIOLOGY | Age: 63
Discharge: HOME | End: 2023-03-21
Payer: COMMERCIAL

## 2023-03-21 DIAGNOSIS — M25.561 CHRONIC PAIN OF BOTH KNEES: ICD-10-CM

## 2023-03-21 DIAGNOSIS — M25.562 CHRONIC PAIN OF BOTH KNEES: ICD-10-CM

## 2023-03-21 DIAGNOSIS — G89.29 CHRONIC PAIN OF BOTH KNEES: ICD-10-CM

## 2023-03-21 PROBLEM — Z13.29 THYROID DISORDER SCREEN: Status: RESOLVED | Noted: 2022-01-02 | Resolved: 2023-03-21

## 2023-03-21 PROBLEM — R05.9 COUGH: Status: RESOLVED | Noted: 2023-02-19 | Resolved: 2023-03-21

## 2023-03-21 PROCEDURE — 71046 X-RAY EXAM CHEST 2 VIEWS: CPT

## 2023-03-21 PROCEDURE — 73610 X-RAY EXAM OF ANKLE: CPT

## 2023-03-21 PROCEDURE — 73562 X-RAY EXAM OF KNEE 3: CPT

## 2023-03-21 PROCEDURE — 73130 X-RAY EXAM OF HAND: CPT

## 2023-03-23 ENCOUNTER — TELEPHONE (OUTPATIENT)
Dept: FAMILY MEDICINE CLINIC | Age: 63
End: 2023-03-23

## 2023-03-23 ENCOUNTER — PATIENT MESSAGE (OUTPATIENT)
Dept: FAMILY MEDICINE CLINIC | Age: 63
End: 2023-03-23

## 2023-03-23 DIAGNOSIS — R53.83 FATIGUE, UNSPECIFIED TYPE: ICD-10-CM

## 2023-03-23 DIAGNOSIS — R53.83 FATIGUE, UNSPECIFIED TYPE: Primary | ICD-10-CM

## 2023-03-23 RX ORDER — MODAFINIL 200 MG/1
TABLET ORAL
Qty: 60 TABLET | Refills: 0 | Status: SHIPPED | OUTPATIENT
Start: 2023-03-23

## 2023-03-23 NOTE — TELEPHONE ENCOUNTER
Sent Mychart message satya does not do knee injections but our two Doctors do and she is welcome to try and make an appt with them

## 2023-03-23 NOTE — PROGRESS NOTES
Osteoarthritis of the knee. You may benefit from injection if you want to try. You can make an appointment with Dr. Javier Granado or Dr. Alexandra Guerrier in our office because they do injections or I can refer to ortho.   Let Ricky Eugene know

## 2023-03-28 RX ORDER — MODAFINIL 200 MG/1
TABLET ORAL
Qty: 60 TABLET | Refills: 0 | Status: SHIPPED | OUTPATIENT
Start: 2023-03-28 | End: 2023-03-31 | Stop reason: SDUPTHER

## 2023-03-30 ENCOUNTER — PATIENT MESSAGE (OUTPATIENT)
Dept: FAMILY MEDICINE CLINIC | Age: 63
End: 2023-03-30

## 2023-03-30 DIAGNOSIS — R53.83 FATIGUE, UNSPECIFIED TYPE: ICD-10-CM

## 2023-03-31 ENCOUNTER — TELEPHONE (OUTPATIENT)
Dept: FAMILY MEDICINE CLINIC | Age: 63
End: 2023-03-31

## 2023-03-31 RX ORDER — MODAFINIL 200 MG/1
TABLET ORAL
Qty: 60 TABLET | Refills: 0 | Status: SHIPPED | OUTPATIENT
Start: 2023-03-31

## 2023-04-18 DIAGNOSIS — Z83.49 FAMILY HISTORY OF MTHFR DEFICIENCY: Primary | ICD-10-CM

## 2023-04-19 RX ORDER — DOXYCYCLINE 100 MG/1
TABLET ORAL
Qty: 14 TABLET | Refills: 0 | Status: SHIPPED | OUTPATIENT
Start: 2023-04-19

## 2023-04-27 RX ORDER — DOXYCYCLINE 100 MG/1
100 TABLET ORAL 2 TIMES DAILY
Qty: 14 TABLET | Refills: 0 | Status: SHIPPED | OUTPATIENT
Start: 2023-04-27

## 2023-05-11 RX ORDER — MONTELUKAST SODIUM 10 MG/1
TABLET ORAL
Qty: 90 TABLET | Refills: 1 | Status: SHIPPED | OUTPATIENT
Start: 2023-05-11

## 2023-05-14 DIAGNOSIS — G47.419 NARCOLEPSY WITHOUT CATAPLEXY: Primary | ICD-10-CM

## 2023-05-16 RX ORDER — LEVOTHYROXINE SODIUM 0.07 MG/1
TABLET ORAL
Qty: 90 TABLET | Refills: 1 | Status: SHIPPED | OUTPATIENT
Start: 2023-05-16

## 2023-05-16 RX ORDER — DULOXETIN HYDROCHLORIDE 30 MG/1
CAPSULE, DELAYED RELEASE ORAL
Qty: 90 CAPSULE | Refills: 1 | Status: SHIPPED | OUTPATIENT
Start: 2023-05-16 | End: 2023-06-12

## 2023-05-16 RX ORDER — LOSARTAN POTASSIUM 100 MG/1
TABLET ORAL
Qty: 90 TABLET | Refills: 2 | Status: SHIPPED | OUTPATIENT
Start: 2023-05-16

## 2023-05-16 RX ORDER — MODAFINIL 200 MG/1
TABLET ORAL
Qty: 60 TABLET | Refills: 0 | Status: SHIPPED | OUTPATIENT
Start: 2023-05-16 | End: 2023-06-06

## 2023-05-16 RX ORDER — MONTELUKAST SODIUM 10 MG/1
TABLET ORAL
Qty: 90 TABLET | Refills: 1 | Status: SHIPPED | OUTPATIENT
Start: 2023-05-16

## 2023-05-16 NOTE — PROGRESS NOTES
Patient last seen 02/ 2023 and med last refilled [id number] for 30 days.   No inconsistencies noted in

## 2023-06-02 RX ORDER — DOXYCYCLINE 100 MG/1
TABLET ORAL
Qty: 14 TABLET | Refills: 0 | OUTPATIENT
Start: 2023-06-02

## 2023-06-06 ENCOUNTER — OFFICE VISIT (OUTPATIENT)
Facility: CLINIC | Age: 63
End: 2023-06-06
Payer: MEDICARE

## 2023-06-06 VITALS
SYSTOLIC BLOOD PRESSURE: 130 MMHG | HEART RATE: 84 BPM | HEIGHT: 65 IN | DIASTOLIC BLOOD PRESSURE: 79 MMHG | TEMPERATURE: 97.6 F | BODY MASS INDEX: 45.05 KG/M2 | WEIGHT: 270.4 LBS | OXYGEN SATURATION: 94 %

## 2023-06-06 DIAGNOSIS — M17.0 PRIMARY OSTEOARTHRITIS OF BOTH KNEES: Primary | ICD-10-CM

## 2023-06-06 PROCEDURE — 99213 OFFICE O/P EST LOW 20 MIN: CPT | Performed by: FAMILY MEDICINE

## 2023-06-06 PROCEDURE — 20610 DRAIN/INJ JOINT/BURSA W/O US: CPT | Performed by: FAMILY MEDICINE

## 2023-06-06 PROCEDURE — 3075F SYST BP GE 130 - 139MM HG: CPT | Performed by: FAMILY MEDICINE

## 2023-06-06 PROCEDURE — 3078F DIAST BP <80 MM HG: CPT | Performed by: FAMILY MEDICINE

## 2023-06-06 RX ORDER — TRIAMCINOLONE ACETONIDE 40 MG/ML
40 INJECTION, SUSPENSION INTRA-ARTICULAR; INTRAMUSCULAR ONCE
Status: COMPLETED | OUTPATIENT
Start: 2023-06-06 | End: 2023-06-06

## 2023-06-06 RX ADMIN — TRIAMCINOLONE ACETONIDE 40 MG: 40 INJECTION, SUSPENSION INTRA-ARTICULAR; INTRAMUSCULAR at 09:39

## 2023-06-06 SDOH — ECONOMIC STABILITY: HOUSING INSECURITY
IN THE LAST 12 MONTHS, WAS THERE A TIME WHEN YOU DID NOT HAVE A STEADY PLACE TO SLEEP OR SLEPT IN A SHELTER (INCLUDING NOW)?: NO

## 2023-06-06 SDOH — ECONOMIC STABILITY: INCOME INSECURITY: HOW HARD IS IT FOR YOU TO PAY FOR THE VERY BASICS LIKE FOOD, HOUSING, MEDICAL CARE, AND HEATING?: NOT HARD AT ALL

## 2023-06-06 SDOH — ECONOMIC STABILITY: FOOD INSECURITY: WITHIN THE PAST 12 MONTHS, YOU WORRIED THAT YOUR FOOD WOULD RUN OUT BEFORE YOU GOT MONEY TO BUY MORE.: NEVER TRUE

## 2023-06-06 SDOH — ECONOMIC STABILITY: FOOD INSECURITY: WITHIN THE PAST 12 MONTHS, THE FOOD YOU BOUGHT JUST DIDN'T LAST AND YOU DIDN'T HAVE MONEY TO GET MORE.: NEVER TRUE

## 2023-06-06 ASSESSMENT — PATIENT HEALTH QUESTIONNAIRE - PHQ9
SUM OF ALL RESPONSES TO PHQ QUESTIONS 1-9: 2
1. LITTLE INTEREST OR PLEASURE IN DOING THINGS: 1
SUM OF ALL RESPONSES TO PHQ9 QUESTIONS 1 & 2: 2
SUM OF ALL RESPONSES TO PHQ QUESTIONS 1-9: 2
SUM OF ALL RESPONSES TO PHQ QUESTIONS 1-9: 2
2. FEELING DOWN, DEPRESSED OR HOPELESS: 1
SUM OF ALL RESPONSES TO PHQ QUESTIONS 1-9: 2

## 2023-06-06 NOTE — PROGRESS NOTES
Norm Red (:  1960) is a {New vs Established:859301013::\"Established patient\"}, presenting virtually for evaluation of the following:    Assessment & Plan   Below is the assessment and plan developed based on review of pertinent history, physical exam, labs, studies, and medications. {There are no diagnoses linked to this encounter. (Refresh or delete this SmartLink)}  No follow-ups on file. Subjective   HPI  Review of Systems       Objective   Patient-Reported Vitals  No data recorded     Physical Exam  {Virtual visit PE with detailed exam Optional:234317988}       {Time Documentation Optional:482305907}    Cassandra Wall, was evaluated through a synchronous (real-time) audio-video encounter. The patient (or guardian if applicable) is aware that this is a billable service, which includes applicable co-pays. This Virtual Visit was conducted with patient's (and/or legal guardian's) consent. Patient identification was verified, and a caregiver was present when appropriate.    The patient was located at {[facility] [facility] - Patient:[id number]}  Provider was located at {[employer] [facility] - Provider:[id number]}         --DENISA Malone NP

## 2023-06-06 NOTE — PROGRESS NOTES
Subjective  Chief Complaint   Patient presents with    Follow-up     L knee injection      HPI:  Rashid Holman is a 63 y.o. female. This is an established patient of the practice that is new to me. She was recently seen for joint pain and establish to have arthritis in her left knee. She was told to return for injection. She has never had a joint injection before. Objective  Vitals:    06/06/23 0830   BP: 130/79   Pulse: 84   Temp: 97.6 °F (36.4 °C)   SpO2: 94%     Physical Exam  Constitutional:       General: She is not in acute distress. Appearance: Normal appearance. She is morbidly obese. Pulmonary:      Effort: Pulmonary effort is normal.   Musculoskeletal:      Comments: Left knee examined and compared to right. There is no erythema, warmth, or edema. Neurological:      General: No focal deficit present. Mental Status: She is alert and oriented to person, place, and time. Mental status is at baseline. Psychiatric:         Mood and Affect: Mood normal.         Behavior: Behavior normal.      Procedure Documentation:  After discussion of the risks and benefits and obtaining verbal consent, the patient elected to proceed with a cortisone injection into left knee It was confirmed that the patient does not have history of prior adverse reactions, active infections, or relevant allergies. There was no effusion, erythema, or warmth, and the skin was clear. * Patient was identified by name and date of birth   * Agreement on procedure being performed was verified  * Risks and Benefits explained to the patient  * Procedure site verified and marked as necessary  * Patient was positioned for comfort  * Consent was signed and verified    Utilizing sterile technique, the skin was prepped. A 22 gauge needle was inserted into the joint(s) via a lateral parapatellar approach The sites were injected with a mixture of 40 mg of Kenalog and 4cc 1% lidocaine.  The injection(s) was/were completed

## 2023-06-06 NOTE — PROGRESS NOTES
Chief Complaint   Patient presents with    Follow-up     L knee injection      /79 (Site: Left Upper Arm, Position: Sitting, Cuff Size: Large Adult)   Pulse 84   Temp 97.6 °F (36.4 °C) (Temporal)   Ht 5' 5\" (1.651 m)   Wt 270 lb 6.4 oz (122.7 kg)   SpO2 94%   BMI 45.00 kg/m²     1. Have you been to the ER, urgent care clinic since your last visit? Hospitalized since your last visit? No    2. Have you seen or consulted any other health care providers outside of the [address] [place] [address] since your last visit? No     3. For patients aged 39-70: Has the patient had a colonoscopy / FIT/ Cologuard? Yes-No Care Gap Present    If the patient is female:    4. For patients aged 41-77: Has the patient had a mammogram within the past 2 years? No      5. For patients aged 21-65: Has the patient had a pap smear?   Yes-No Care Gap Present     PHQ-9 Total Score: 2 (6/6/2023  8:29 AM)

## 2023-06-13 DIAGNOSIS — M79.7 FIBROMYALGIA: Primary | ICD-10-CM

## 2023-06-19 RX ORDER — PREGABALIN 100 MG/1
CAPSULE ORAL
Qty: 60 CAPSULE | Refills: 0 | Status: SHIPPED | OUTPATIENT
Start: 2023-06-19 | End: 2023-07-19

## 2023-06-19 RX ORDER — PREGABALIN 100 MG/1
CAPSULE ORAL
Qty: 180 CAPSULE | Refills: 0 | OUTPATIENT
Start: 2023-06-19

## 2023-06-19 NOTE — TELEPHONE ENCOUNTER
Has appointment scheduled 08/2023 and med last refilled [id number] for 30 day supply.   No inconsistencies noted in

## 2023-07-13 DIAGNOSIS — M79.7 FIBROMYALGIA: ICD-10-CM

## 2023-07-16 RX ORDER — PREGABALIN 100 MG/1
CAPSULE ORAL
Qty: 60 CAPSULE | Refills: 0 | Status: SHIPPED | OUTPATIENT
Start: 2023-07-16 | End: 2023-08-15

## 2023-07-18 RX ORDER — BENZONATATE 200 MG/1
CAPSULE ORAL
Qty: 90 CAPSULE | Refills: 0 | Status: SHIPPED | OUTPATIENT
Start: 2023-07-18

## 2023-08-09 ENCOUNTER — OFFICE VISIT (OUTPATIENT)
Facility: CLINIC | Age: 63
End: 2023-08-09
Payer: MEDICARE

## 2023-08-09 VITALS
SYSTOLIC BLOOD PRESSURE: 142 MMHG | OXYGEN SATURATION: 97 % | HEART RATE: 95 BPM | RESPIRATION RATE: 16 BRPM | TEMPERATURE: 98.7 F | DIASTOLIC BLOOD PRESSURE: 90 MMHG

## 2023-08-09 DIAGNOSIS — E86.0 DEHYDRATION: ICD-10-CM

## 2023-08-09 DIAGNOSIS — A08.4 VIRAL GASTROENTERITIS: Primary | ICD-10-CM

## 2023-08-09 DIAGNOSIS — I10 PRIMARY HYPERTENSION: ICD-10-CM

## 2023-08-09 PROCEDURE — 99214 OFFICE O/P EST MOD 30 MIN: CPT | Performed by: NURSE PRACTITIONER

## 2023-08-09 PROCEDURE — G8427 DOCREV CUR MEDS BY ELIG CLIN: HCPCS | Performed by: NURSE PRACTITIONER

## 2023-08-09 PROCEDURE — 3017F COLORECTAL CA SCREEN DOC REV: CPT | Performed by: NURSE PRACTITIONER

## 2023-08-09 PROCEDURE — 1036F TOBACCO NON-USER: CPT | Performed by: NURSE PRACTITIONER

## 2023-08-09 PROCEDURE — 3077F SYST BP >= 140 MM HG: CPT | Performed by: NURSE PRACTITIONER

## 2023-08-09 PROCEDURE — G8417 CALC BMI ABV UP PARAM F/U: HCPCS | Performed by: NURSE PRACTITIONER

## 2023-08-09 PROCEDURE — 3080F DIAST BP >= 90 MM HG: CPT | Performed by: NURSE PRACTITIONER

## 2023-08-09 RX ORDER — ONDANSETRON 8 MG/1
8 TABLET, ORALLY DISINTEGRATING ORAL EVERY 8 HOURS PRN
Qty: 20 TABLET | Refills: 0 | Status: SHIPPED | OUTPATIENT
Start: 2023-08-09

## 2023-08-09 NOTE — PROGRESS NOTES
zoSubjective  Chief Complaint   Patient presents with    Other     Nausea, headache, gassy, coughing, stomach pain, dizzy, sore mouth, headache, diarreah x 2weeks     HPI:  Gorge Marquez is a 63 y.o. female. Patient presents with complaint of nausea over the past two weeks. Has been worsening since onset. Reports vomiting, body aches, headache, dry heaves, chills, diarrhea, bloating for the past 2-3 days. Diarrhea 5-6 times today. 10 episodes of dry heaves and emesis today. Intentionally trying to \"dehydrate\" herself. Last meal was dinner of mac and cheese. Only coffee this morning. Not drinking water. Does not monitor home BP readings. Evaluation to date: none  Aggravating factors: unable to identify  Reliving factors: dry toast  Treatment to date: nothing   Relevant PMH: No pertinent additional PMH.       Past Medical History:   Diagnosis Date    Acquired talipes planus, unspecified laterality     Anal spasm 07/14/2021    Anemia     Anxiety     Bronchitis     Chronic pain     neck and back    Contact dermatitis and eczema due to cause     Depression     Dysphagia     Hashimoto's thyroiditis     Headache     Hypercholesterolemia     Hypertensive disorder     Insomnia     Osteoarthritis     Plantar fasciitis     Seasonal allergic reaction     Trauma      Family History   Problem Relation Age of Onset    Headache Mother     COPD Mother     Heart Disease Mother     Migraines Mother     Stroke Mother     COPD Father     Heart Attack Father     Headache Father     Heart Disease Father     Migraines Father     Cancer Sister         Rectal akin cancer    Headache Sister     Migraines Sister     Diabetes Brother     Headache Brother     Breast Cancer Paternal Grandmother     Headache Sister     Migraines Sister      Social History     Socioeconomic History    Marital status:      Spouse name: Not on file    Number of children: Not on file    Years of education: Not on file    Highest education level: Not on

## 2023-08-11 ENCOUNTER — TELEPHONE (OUTPATIENT)
Facility: CLINIC | Age: 63
End: 2023-08-11

## 2023-08-11 ENCOUNTER — TELEMEDICINE (OUTPATIENT)
Facility: CLINIC | Age: 63
End: 2023-08-11
Payer: MEDICARE

## 2023-08-11 DIAGNOSIS — J98.9 RESPIRATORY DISORDER: ICD-10-CM

## 2023-08-11 DIAGNOSIS — F33.2 MAJOR DEPRESSIVE DISORDER, RECURRENT SEVERE WITHOUT PSYCHOTIC FEATURES (HCC): ICD-10-CM

## 2023-08-11 DIAGNOSIS — M17.0 PRIMARY OSTEOARTHRITIS OF BOTH KNEES: ICD-10-CM

## 2023-08-11 DIAGNOSIS — M79.7 FIBROMYALGIA: Primary | ICD-10-CM

## 2023-08-11 DIAGNOSIS — N39.41 URGE INCONTINENCE OF URINE: ICD-10-CM

## 2023-08-11 DIAGNOSIS — R05.3 CHRONIC COUGH: ICD-10-CM

## 2023-08-11 PROCEDURE — 3017F COLORECTAL CA SCREEN DOC REV: CPT | Performed by: NURSE PRACTITIONER

## 2023-08-11 PROCEDURE — 1036F TOBACCO NON-USER: CPT | Performed by: NURSE PRACTITIONER

## 2023-08-11 PROCEDURE — G8427 DOCREV CUR MEDS BY ELIG CLIN: HCPCS | Performed by: NURSE PRACTITIONER

## 2023-08-11 PROCEDURE — 99214 OFFICE O/P EST MOD 30 MIN: CPT | Performed by: NURSE PRACTITIONER

## 2023-08-11 PROCEDURE — G8417 CALC BMI ABV UP PARAM F/U: HCPCS | Performed by: NURSE PRACTITIONER

## 2023-08-11 NOTE — PROGRESS NOTES
Chief Complaint   Patient presents with    6 Month Follow-Up     Having problem incontinence,      clinic since your last visit? Hospitalized since your last visit? No    2. Have you seen or consulted any other health care providers outside of the [address] [place] [address] Avenue since your last visit? No     3. For patients aged 43-73: Has the patient had a colonoscopy / FIT/ Cologuard? No    If the patient is female:    4. For patients aged 43-66: Has the patient had a mammogram within the past 2 years? No      5. For patients aged 21-65: Has the patient had a pap smear? No    There were no vitals taken for this visit. Amb Fall Risk Assessment and TUG Test 1/17/2022   Fall in past 12 months? -   Able to walk?  -   Total Score 1

## 2023-08-11 NOTE — TELEPHONE ENCOUNTER
Patient had VV Friday with TSS patient wants to make a complaint about her appt she had in person on 8/9 with CARL. Patient stated she wanted to speak to manager about the screening process was told she could not get labs because of her being sick and if we could not help her what was the point of the appt I explained manager was out this week but would be back Monday. Patient stated that is fine she is in no hurry.

## 2023-08-12 DIAGNOSIS — M79.7 FIBROMYALGIA: ICD-10-CM

## 2023-08-14 NOTE — TELEPHONE ENCOUNTER
Future Appointments  8/14/2023 - 8/13/2025   Date Visit Type Department Provider    10/11/2023  2:00 PM OFFICE VISIT [address] [facility] Terrence Gomez MD   Appointment Notes:    injection for left knee arthritis

## 2023-08-16 DIAGNOSIS — M79.7 FIBROMYALGIA: ICD-10-CM

## 2023-08-16 NOTE — TELEPHONE ENCOUNTER
Pt  calling In regards to pt needing a refill of Pregabalin 100mg to the [facility] at [address] 08/11/2023  Next 10/11/2023  Pt is currently completely out of medication

## 2023-08-17 DIAGNOSIS — A08.4 VIRAL GASTROENTERITIS: ICD-10-CM

## 2023-08-18 RX ORDER — PREGABALIN 100 MG/1
100 CAPSULE ORAL 2 TIMES DAILY
Qty: 60 CAPSULE | Refills: 0 | Status: SHIPPED | OUTPATIENT
Start: 2023-08-18 | End: 2023-09-17

## 2023-08-18 RX ORDER — PREGABALIN 100 MG/1
CAPSULE ORAL
Qty: 60 CAPSULE | Refills: 0 | OUTPATIENT
Start: 2023-08-18 | End: 2023-09-17

## 2023-08-18 RX ORDER — ONDANSETRON 8 MG/1
TABLET, ORALLY DISINTEGRATING ORAL
Qty: 20 TABLET | Refills: 0 | OUTPATIENT
Start: 2023-08-18

## 2023-08-21 RX ORDER — OXYBUTYNIN CHLORIDE 10 MG/1
TABLET, EXTENDED RELEASE ORAL
Qty: 90 TABLET | Refills: 1 | Status: SHIPPED | OUTPATIENT
Start: 2023-08-21

## 2023-08-21 NOTE — PROGRESS NOTES
Pulmonology referral to [facility] [address] [place], [place] [address], MD  [place] [address]  [phone]

## 2023-08-22 RX ORDER — LEVOTHYROXINE SODIUM 0.07 MG/1
75 TABLET ORAL DAILY
Qty: 90 TABLET | Refills: 1 | Status: SHIPPED | OUTPATIENT
Start: 2023-08-22

## 2023-08-22 RX ORDER — DULOXETIN HYDROCHLORIDE 30 MG/1
CAPSULE, DELAYED RELEASE ORAL
Qty: 90 CAPSULE | Refills: 1 | OUTPATIENT
Start: 2023-08-22

## 2023-08-22 RX ORDER — LEVOTHYROXINE SODIUM 0.07 MG/1
TABLET ORAL
Qty: 90 TABLET | Refills: 1 | OUTPATIENT
Start: 2023-08-22

## 2023-08-22 RX ORDER — DULOXETIN HYDROCHLORIDE 60 MG/1
60 CAPSULE, DELAYED RELEASE ORAL 2 TIMES DAILY
Qty: 180 CAPSULE | Refills: 1 | Status: SHIPPED | OUTPATIENT
Start: 2023-08-22

## 2023-08-29 RX ORDER — RIZATRIPTAN BENZOATE 10 MG/1
TABLET, ORALLY DISINTEGRATING ORAL
Qty: 30 TABLET | Refills: 1 | Status: SHIPPED | OUTPATIENT
Start: 2023-08-29

## 2023-08-29 RX ORDER — DICLOFENAC SODIUM 75 MG/1
TABLET, DELAYED RELEASE ORAL
Qty: 180 TABLET | Refills: 1 | Status: SHIPPED | OUTPATIENT
Start: 2023-08-29

## 2023-08-29 RX ORDER — MONTELUKAST SODIUM 10 MG/1
TABLET ORAL
Qty: 90 TABLET | Refills: 1 | Status: SHIPPED | OUTPATIENT
Start: 2023-08-29

## 2023-09-05 ENCOUNTER — PATIENT MESSAGE (OUTPATIENT)
Facility: CLINIC | Age: 63
End: 2023-09-05

## 2023-09-05 NOTE — TELEPHONE ENCOUNTER
From: Shaun Siegel  To: July Falcon  Sent: 9/5/2023 1:37 PM EDT  Subject: Antibiotics    I requested a refill for antibiotics through [facility] since I am ferociously sick with another sinus infection. Also, I doubled my Claritin dose during the day to kind of mimic Claritin d. It's helping a lot with the co festoon and coughing which also helps with the incontinence. Should we maybe increase the dose for the montelukast or add a medication to help dry out my sinuses? And if we did would that help prevent sinus infections since they're drier?     Just wondering and thanks

## 2023-09-05 NOTE — TELEPHONE ENCOUNTER
Future Appointments  9/5/2023 - 9/4/2025   Date Visit Type Department Provider    10/11/2023  2:00 PM OFFICE VISIT 230 [facility] Ignacia Joel MD   Appointment Notes:    injection for left knee arthritis

## 2023-09-06 RX ORDER — DOXYCYCLINE 100 MG/1
TABLET ORAL
Qty: 14 TABLET | Refills: 0 | OUTPATIENT
Start: 2023-09-06

## 2023-09-07 RX ORDER — PREDNISONE 20 MG/1
20 TABLET ORAL DAILY
Qty: 7 TABLET | Refills: 0 | Status: SHIPPED | OUTPATIENT
Start: 2023-09-07 | End: 2023-09-14

## 2023-09-07 RX ORDER — BENZONATATE 200 MG/1
CAPSULE ORAL
Qty: 90 CAPSULE | Refills: 1 | Status: SHIPPED | OUTPATIENT
Start: 2023-09-07

## 2023-09-11 RX ORDER — DOXYCYCLINE HYCLATE 100 MG
100 TABLET ORAL 2 TIMES DAILY
Qty: 10 TABLET | Refills: 0 | Status: SHIPPED | OUTPATIENT
Start: 2023-09-11 | End: 2023-09-16

## 2023-09-11 NOTE — TELEPHONE ENCOUNTER
What the next step is gong back to your ENT. I will write for an antibiotic but you need to follow up with them. What is happening is I am treating you through my chart and that is not a good idea.

## 2023-09-22 DIAGNOSIS — M79.7 FIBROMYALGIA: ICD-10-CM

## 2023-09-22 RX ORDER — PREGABALIN 100 MG/1
100 CAPSULE ORAL 2 TIMES DAILY
Qty: 60 CAPSULE | Refills: 0 | OUTPATIENT
Start: 2023-09-22

## 2023-09-22 RX ORDER — PREGABALIN 100 MG/1
100 CAPSULE ORAL 2 TIMES DAILY
Qty: 60 CAPSULE | Refills: 1 | Status: SHIPPED | OUTPATIENT
Start: 2023-09-22 | End: 2023-11-21

## 2023-09-22 NOTE — TELEPHONE ENCOUNTER
Future Appointments  9/22/2023 - 9/21/2025         Date Visit Type Department Provider    10/11/2023  2:00 PM OFFICE VISIT 230 [facility] Dane Cervantes MD   Appointment Notes:    injection for left knee arthritis

## 2023-09-29 RX ORDER — TRAZODONE HYDROCHLORIDE 100 MG/1
TABLET ORAL
Qty: 360 TABLET | Refills: 1 | Status: SHIPPED | OUTPATIENT
Start: 2023-09-29

## 2023-10-10 NOTE — LETTER
8/31/2021    Patient: Nicole Kumari   YOB: 1960   Date of Visit: 8/31/2021     Maame Sr NP  [address] [facility]  [address] [address] [facility]    Dear Maame Sr NP,      Thank you for referring Ms. Reyes Cantu to [facility] [address], [facility]RO[facility] AND [facility]ERGY CARE for evaluation. My notes for this consultation are attached. If you have questions, please do not hesitate to call me. I look forward to following your patient along with you.       Sincerely,    Ashley Joya MD Patient

## 2023-10-11 ENCOUNTER — OFFICE VISIT (OUTPATIENT)
Facility: CLINIC | Age: 63
End: 2023-10-11
Payer: MEDICARE

## 2023-10-11 VITALS
HEART RATE: 72 BPM | SYSTOLIC BLOOD PRESSURE: 142 MMHG | DIASTOLIC BLOOD PRESSURE: 82 MMHG | OXYGEN SATURATION: 98 % | BODY MASS INDEX: 44.15 KG/M2 | TEMPERATURE: 97.6 F | HEIGHT: 65 IN | WEIGHT: 265 LBS | RESPIRATION RATE: 20 BRPM

## 2023-10-11 DIAGNOSIS — Z23 ENCOUNTER FOR IMMUNIZATION: ICD-10-CM

## 2023-10-11 DIAGNOSIS — M17.0 PRIMARY OSTEOARTHRITIS OF BOTH KNEES: Primary | ICD-10-CM

## 2023-10-11 PROBLEM — G89.29 CHRONIC PAIN OF RIGHT ANKLE: Status: RESOLVED | Noted: 2023-02-19 | Resolved: 2023-10-11

## 2023-10-11 PROBLEM — M25.571 CHRONIC PAIN OF RIGHT ANKLE: Status: RESOLVED | Noted: 2023-02-19 | Resolved: 2023-10-11

## 2023-10-11 PROCEDURE — G0008 ADMIN INFLUENZA VIRUS VAC: HCPCS | Performed by: FAMILY MEDICINE

## 2023-10-11 PROCEDURE — 90674 CCIIV4 VAC NO PRSV 0.5 ML IM: CPT | Performed by: FAMILY MEDICINE

## 2023-10-11 PROCEDURE — 99213 OFFICE O/P EST LOW 20 MIN: CPT | Performed by: FAMILY MEDICINE

## 2023-10-11 PROCEDURE — 20610 DRAIN/INJ JOINT/BURSA W/O US: CPT | Performed by: FAMILY MEDICINE

## 2023-10-11 PROCEDURE — G8427 DOCREV CUR MEDS BY ELIG CLIN: HCPCS | Performed by: FAMILY MEDICINE

## 2023-10-11 PROCEDURE — 1036F TOBACCO NON-USER: CPT | Performed by: FAMILY MEDICINE

## 2023-10-11 PROCEDURE — 3077F SYST BP >= 140 MM HG: CPT | Performed by: FAMILY MEDICINE

## 2023-10-11 PROCEDURE — 3079F DIAST BP 80-89 MM HG: CPT | Performed by: FAMILY MEDICINE

## 2023-10-11 PROCEDURE — 3017F COLORECTAL CA SCREEN DOC REV: CPT | Performed by: FAMILY MEDICINE

## 2023-10-11 PROCEDURE — G8482 FLU IMMUNIZE ORDER/ADMIN: HCPCS | Performed by: FAMILY MEDICINE

## 2023-10-11 PROCEDURE — G8417 CALC BMI ABV UP PARAM F/U: HCPCS | Performed by: FAMILY MEDICINE

## 2023-10-11 RX ORDER — TRIAMCINOLONE ACETONIDE 40 MG/ML
80 INJECTION, SUSPENSION INTRA-ARTICULAR; INTRAMUSCULAR ONCE
Status: COMPLETED | OUTPATIENT
Start: 2023-10-11 | End: 2023-10-11

## 2023-10-11 RX ADMIN — TRIAMCINOLONE ACETONIDE 80 MG: 40 INJECTION, SUSPENSION INTRA-ARTICULAR; INTRAMUSCULAR at 14:29

## 2023-10-11 ASSESSMENT — PATIENT HEALTH QUESTIONNAIRE - PHQ9
SUM OF ALL RESPONSES TO PHQ9 QUESTIONS 1 & 2: 0
SUM OF ALL RESPONSES TO PHQ QUESTIONS 1-9: 0
SUM OF ALL RESPONSES TO PHQ QUESTIONS 1-9: 0
2. FEELING DOWN, DEPRESSED OR HOPELESS: 0
SUM OF ALL RESPONSES TO PHQ QUESTIONS 1-9: 0
SUM OF ALL RESPONSES TO PHQ QUESTIONS 1-9: 0
1. LITTLE INTEREST OR PLEASURE IN DOING THINGS: 0

## 2023-11-01 ENCOUNTER — PATIENT MESSAGE (OUTPATIENT)
Facility: CLINIC | Age: 63
End: 2023-11-01

## 2023-11-02 NOTE — TELEPHONE ENCOUNTER
From: Belem Stubbs  To: Radha Maki  Sent: 11/1/2023 4:03 PM EDT  Subject: [facility]    Would you please do another order for [facility] [address]?  Hope to get it sorted out soon

## 2023-11-03 DIAGNOSIS — F51.01 PRIMARY INSOMNIA: Primary | ICD-10-CM

## 2023-11-07 RX ORDER — ATORVASTATIN CALCIUM 10 MG/1
10 TABLET, FILM COATED ORAL DAILY
Qty: 90 TABLET | Refills: 1 | Status: SHIPPED | OUTPATIENT
Start: 2023-11-07

## 2023-11-07 RX ORDER — DULOXETIN HYDROCHLORIDE 60 MG/1
60 CAPSULE, DELAYED RELEASE ORAL 2 TIMES DAILY
Qty: 180 CAPSULE | Refills: 1 | Status: SHIPPED | OUTPATIENT
Start: 2023-11-07

## 2023-11-07 RX ORDER — MONTELUKAST SODIUM 10 MG/1
TABLET ORAL
Qty: 90 TABLET | Refills: 1 | Status: SHIPPED | OUTPATIENT
Start: 2023-11-07

## 2023-11-16 ENCOUNTER — PATIENT MESSAGE (OUTPATIENT)
Facility: CLINIC | Age: 63
End: 2023-11-16

## 2023-11-16 DIAGNOSIS — M79.7 FIBROMYALGIA: ICD-10-CM

## 2023-11-16 RX ORDER — PREGABALIN 100 MG/1
100 CAPSULE ORAL 2 TIMES DAILY
Qty: 60 CAPSULE | Refills: 0 | Status: SHIPPED | OUTPATIENT
Start: 2023-11-16 | End: 2023-12-16

## 2023-11-16 NOTE — TELEPHONE ENCOUNTER
From: Queenie Reid  To: Mariela Bello  Sent: 11/16/2023 10:29 AM EST  Subject: Our appt tomorrow - handout enclosed    We've put together a \"handout\" for tomorrows visit. Makes me laugh but there's just so much going on that I need help with. Thought it might help you take less notes, too, and it helps me because I would never be able to remember it all or make any sense of it.     Thanks in advance,  Kirit Cee

## 2023-11-17 ENCOUNTER — TELEMEDICINE (OUTPATIENT)
Facility: CLINIC | Age: 63
End: 2023-11-17
Payer: MEDICARE

## 2023-11-17 DIAGNOSIS — M17.0 PRIMARY OSTEOARTHRITIS OF BOTH KNEES: ICD-10-CM

## 2023-11-17 DIAGNOSIS — E66.01 SEVERE OBESITY (BMI 35.0-39.9) WITH COMORBIDITY (HCC): ICD-10-CM

## 2023-11-17 DIAGNOSIS — I10 PRIMARY HYPERTENSION: ICD-10-CM

## 2023-11-17 DIAGNOSIS — Z13.1 ENCOUNTER FOR SCREENING FOR DIABETES MELLITUS: ICD-10-CM

## 2023-11-17 DIAGNOSIS — Z91.81 HISTORY OF FALL: Primary | ICD-10-CM

## 2023-11-17 DIAGNOSIS — Z13.0 SCREENING FOR DEFICIENCY ANEMIA: ICD-10-CM

## 2023-11-17 DIAGNOSIS — Z13.228 ENCOUNTER FOR SCREENING FOR OTHER METABOLIC DISORDERS: ICD-10-CM

## 2023-11-17 DIAGNOSIS — E78.00 PURE HYPERCHOLESTEROLEMIA, UNSPECIFIED: ICD-10-CM

## 2023-11-17 DIAGNOSIS — E55.9 VITAMIN D DEFICIENCY, UNSPECIFIED: ICD-10-CM

## 2023-11-17 DIAGNOSIS — E66.01 MORBID (SEVERE) OBESITY DUE TO EXCESS CALORIES (HCC): ICD-10-CM

## 2023-11-17 DIAGNOSIS — E03.8 HYPOTHYROIDISM DUE TO HASHIMOTO'S THYROIDITIS: ICD-10-CM

## 2023-11-17 DIAGNOSIS — E06.3 HYPOTHYROIDISM DUE TO HASHIMOTO'S THYROIDITIS: ICD-10-CM

## 2023-11-17 DIAGNOSIS — Z74.1 REQUIRES ASSISTANCE WITH ACTIVITIES OF DAILY LIVING (ADL): ICD-10-CM

## 2023-11-17 DIAGNOSIS — M79.7 FIBROMYALGIA: ICD-10-CM

## 2023-11-17 DIAGNOSIS — E55.9 VITAMIN D DEFICIENCY: ICD-10-CM

## 2023-11-17 DIAGNOSIS — E03.9 ACQUIRED HYPOTHYROIDISM: ICD-10-CM

## 2023-11-17 PROCEDURE — 3017F COLORECTAL CA SCREEN DOC REV: CPT | Performed by: NURSE PRACTITIONER

## 2023-11-17 PROCEDURE — G8427 DOCREV CUR MEDS BY ELIG CLIN: HCPCS | Performed by: NURSE PRACTITIONER

## 2023-11-17 PROCEDURE — G8417 CALC BMI ABV UP PARAM F/U: HCPCS | Performed by: NURSE PRACTITIONER

## 2023-11-17 PROCEDURE — 1036F TOBACCO NON-USER: CPT | Performed by: NURSE PRACTITIONER

## 2023-11-17 PROCEDURE — 99213 OFFICE O/P EST LOW 20 MIN: CPT | Performed by: NURSE PRACTITIONER

## 2023-11-17 PROCEDURE — G8482 FLU IMMUNIZE ORDER/ADMIN: HCPCS | Performed by: NURSE PRACTITIONER

## 2023-11-17 RX ORDER — MULTIVIT-MIN/IRON/FOLIC ACID/K 18-600-40
CAPSULE ORAL
COMMUNITY

## 2023-11-17 RX ORDER — ASCORBIC ACID 500 MG
500 TABLET ORAL DAILY
COMMUNITY

## 2023-11-17 NOTE — PROGRESS NOTES
Ledy Gutierrez, was evaluated through a synchronous (real-time) audio-video encounter. The patient (or guardian if applicable) is aware that this is a billable service, which includes applicable co-pays. This Virtual Visit was conducted with patient's (and/or legal guardian's) consent. Patient identification was verified, and a caregiver was present when appropriate. The patient was located at Home: [address] [place] [address]  Provider was located at Home ([address]): [address] (:  1960) is a Established patient, presenting virtually for evaluation of the following:    Assessment & Plan   Below is the assessment and plan developed based on review of pertinent history, physical exam, labs, studies, and medications. 1. History of fall  -     External Referral To Home Health  Referral to home health for home PT  2. Fibromyalgia  -     External Referral To Home Health  Referral to home health for home PT OT and nursing assessments  3. Primary osteoarthritis of both knees  -     External Referral To Home Health  Referral to home health for assistance with mobility with PT and OT  4. Primary hypertension  -     External Referral To Home Health  Referral to home health for skilled nursing assessment of hypertension  5. Morbid (severe) obesity due to excess calories [facility])  -     External Referral To Home Health  Referral to home health to increase patient's activity and mediate her obesity. 6. Requires assistance with activities of daily living (ADL)  -     External Referral To Home Health  Referral to home health with possible referral for home health aide for further evaluation and treatment and assistance with ADLs           Subjective   68-year-old female presents to request referral for home health. Patient has great difficulty performing her ADLs and also has mobility issues and sometimes debilitating anxiety and depression.   She is on multiple medications  and

## 2023-12-13 ENCOUNTER — OFFICE VISIT (OUTPATIENT)
Facility: CLINIC | Age: 63
End: 2023-12-13
Payer: MEDICARE

## 2023-12-13 VITALS
BODY MASS INDEX: 44.18 KG/M2 | OXYGEN SATURATION: 98 % | TEMPERATURE: 98.7 F | HEIGHT: 65 IN | HEART RATE: 94 BPM | WEIGHT: 265.2 LBS | RESPIRATION RATE: 20 BRPM | DIASTOLIC BLOOD PRESSURE: 80 MMHG | SYSTOLIC BLOOD PRESSURE: 138 MMHG

## 2023-12-13 DIAGNOSIS — G89.29 CHRONIC LEFT SHOULDER PAIN: ICD-10-CM

## 2023-12-13 DIAGNOSIS — M25.512 CHRONIC LEFT SHOULDER PAIN: ICD-10-CM

## 2023-12-13 DIAGNOSIS — G93.32 CFS (CHRONIC FATIGUE SYNDROME): Primary | ICD-10-CM

## 2023-12-13 PROCEDURE — G8427 DOCREV CUR MEDS BY ELIG CLIN: HCPCS | Performed by: FAMILY MEDICINE

## 2023-12-13 PROCEDURE — 3075F SYST BP GE 130 - 139MM HG: CPT | Performed by: FAMILY MEDICINE

## 2023-12-13 PROCEDURE — 1036F TOBACCO NON-USER: CPT | Performed by: FAMILY MEDICINE

## 2023-12-13 PROCEDURE — G8482 FLU IMMUNIZE ORDER/ADMIN: HCPCS | Performed by: FAMILY MEDICINE

## 2023-12-13 PROCEDURE — 99213 OFFICE O/P EST LOW 20 MIN: CPT | Performed by: FAMILY MEDICINE

## 2023-12-13 PROCEDURE — 3079F DIAST BP 80-89 MM HG: CPT | Performed by: FAMILY MEDICINE

## 2023-12-13 PROCEDURE — G8417 CALC BMI ABV UP PARAM F/U: HCPCS | Performed by: FAMILY MEDICINE

## 2023-12-13 PROCEDURE — 3017F COLORECTAL CA SCREEN DOC REV: CPT | Performed by: FAMILY MEDICINE

## 2023-12-13 ASSESSMENT — PATIENT HEALTH QUESTIONNAIRE - PHQ9
SUM OF ALL RESPONSES TO PHQ9 QUESTIONS 1 & 2: 0
1. LITTLE INTEREST OR PLEASURE IN DOING THINGS: 0
SUM OF ALL RESPONSES TO PHQ QUESTIONS 1-9: 0
2. FEELING DOWN, DEPRESSED OR HOPELESS: 0
SUM OF ALL RESPONSES TO PHQ QUESTIONS 1-9: 0

## 2024-01-10 RX ORDER — LEVOTHYROXINE SODIUM 0.07 MG/1
75 TABLET ORAL DAILY
Qty: 90 TABLET | Refills: 1 | Status: SHIPPED | OUTPATIENT
Start: 2024-01-10

## 2024-01-10 RX ORDER — LEVOTHYROXINE SODIUM 0.07 MG/1
75 TABLET ORAL DAILY
Qty: 90 TABLET | Refills: 1 | OUTPATIENT
Start: 2024-01-10

## 2024-01-10 RX ORDER — TRAZODONE HYDROCHLORIDE 100 MG/1
TABLET ORAL
Qty: 360 TABLET | Refills: 1 | OUTPATIENT
Start: 2024-01-10

## 2024-01-10 NOTE — TELEPHONE ENCOUNTER
Future Appointments  1/10/2024 - 1/9/2026   Date Visit Type Department Provider    6/17/2024 11:00 AM OFFICE VISIT [facility] Dawit De Leon, DENISA - NP   Appointment Notes:    follow up

## 2024-01-13 DIAGNOSIS — M79.7 FIBROMYALGIA: ICD-10-CM

## 2024-01-15 RX ORDER — PREGABALIN 100 MG/1
CAPSULE ORAL
Qty: 60 CAPSULE | Refills: 0 | Status: SHIPPED | OUTPATIENT
Start: 2024-01-15 | End: 2024-02-14

## 2024-01-15 NOTE — TELEPHONE ENCOUNTER
Future Appointments  1/15/2024 - 1/14/2026   Date Visit Type Department Provider    6/17/2024 11:00 AM OFFICE VISIT [facility] Dawit De Leon, DENISA - NP   Appointment Notes:    follow up

## 2024-01-23 LAB — HBA1C MFR BLD HPLC: 5.8 %

## 2024-02-17 DIAGNOSIS — M79.7 FIBROMYALGIA: ICD-10-CM

## 2024-02-19 ENCOUNTER — TELEPHONE (OUTPATIENT)
Facility: CLINIC | Age: 64
End: 2024-02-19

## 2024-02-19 DIAGNOSIS — Z12.11 COLON CANCER SCREENING: Primary | ICD-10-CM

## 2024-02-19 RX ORDER — PREGABALIN 100 MG/1
CAPSULE ORAL
Qty: 60 CAPSULE | Refills: 0 | Status: SHIPPED | OUTPATIENT
Start: 2024-02-19 | End: 2024-03-20

## 2024-02-19 NOTE — TELEPHONE ENCOUNTER
Last seen [id number] and med last refilled [id number].  No inconsistencies noted in .  Refilled lyrica

## 2024-02-19 NOTE — PROGRESS NOTES
Referral to: [facility]   [address], [place] [address]  Phone: [phone]  For screening colonoscopy

## 2024-02-19 NOTE — TELEPHONE ENCOUNTER
----- Message from Estefani Bansal sent at 2/19/2024  3:21 PM EST -----  Subject: Referral Request    Reason for referral request? pt was calling for an order for colonoscopy.   Said she had an order but i did not see anything, please advise.   Provider patient wants to be referred to(if known):     Provider Phone Number(if known):    Additional Information for Provider?   ---------------------------------------------------------------------------  --------------  CALL BACK INFO    [phone]; OK to leave message on voicemail  ---------------------------------------------------------------------------  --------------

## 2024-02-19 NOTE — TELEPHONE ENCOUNTER
Future Appointments  2/19/2024 - 2/18/2026   Date Visit Type Department Provider    6/17/2024 11:00 AM OFFICE VISIT [facility] Dawit De Leon, DENISA - NP   Appointment Notes:    follow up

## 2024-02-28 RX ORDER — TRAZODONE HYDROCHLORIDE 100 MG/1
TABLET ORAL
Qty: 360 TABLET | Refills: 1 | OUTPATIENT
Start: 2024-02-28

## 2024-02-28 RX ORDER — OXYBUTYNIN CHLORIDE 10 MG/1
TABLET, EXTENDED RELEASE ORAL
Qty: 90 TABLET | Refills: 1 | Status: SHIPPED | OUTPATIENT
Start: 2024-02-28

## 2024-02-28 NOTE — TELEPHONE ENCOUNTER
Future Appointments  2/28/2024 - 2/27/2026   Date Visit Type Department Provider    6/17/2024 11:00 AM OFFICE VISIT [facility] Dawit De Leon, APRN - NP   Appointment Notes:    follow up, *Needs Mammogram / Records*

## 2024-03-03 RX ORDER — TRAZODONE HYDROCHLORIDE 100 MG/1
TABLET ORAL
Qty: 360 TABLET | Refills: 1 | OUTPATIENT
Start: 2024-03-03

## 2024-03-04 RX ORDER — TRAZODONE HYDROCHLORIDE 100 MG/1
TABLET ORAL
Qty: 360 TABLET | Refills: 1 | OUTPATIENT
Start: 2024-03-04

## 2024-03-05 ENCOUNTER — TELEPHONE (OUTPATIENT)
Facility: CLINIC | Age: 64
End: 2024-03-05

## 2024-03-05 NOTE — TELEPHONE ENCOUNTER
I denied this medication because it was listed in the chart as discontinued by [facility] who has been her PCP.  She also has issues with chronic fatigue and this medication at this high of a dose would make fatigue much worse.  I would not restart 400mg of trazodone. She is welcome to schedule with any provider to discuss.

## 2024-03-05 NOTE — TELEPHONE ENCOUNTER
Received two after hours calls (5 minutes apart) from patient with message that she is requesting refill of trazodone and it has been denied multiple times. Pt acknowledged in message that she knows the policy is that we do not refill medications on after-hours call but she felt this was an urgent matter.     Reviewed chart. Pt had been seeing Dawit De Leon who is no longer working at this office. It appears the refill request was sent in and denied multiple times because per our chart it had been discontinued on 11/3/23 and an alternative medication was sent in at that time, suvorexant. However it looks like the suvorexant was only written for 30 days and never refilled.     Returned pt's call to clarify but she did not answer. If she has still been taking this medication and is not taking the suvorexant, then we can refill the trazodone but at a max dose of 300mg, as higher doses are not recommended.    Could we call to clarify and see if she would like the 300mg of trazodone? If she would like to discuss other options we can offer her a sooner appt.     Thanks!

## 2024-03-05 NOTE — TELEPHONE ENCOUNTER
Called and explain to patient regarding the refusal of medication and patient said that she will be going else where.

## 2024-04-11 RX ORDER — LOSARTAN POTASSIUM 100 MG/1
TABLET ORAL
Qty: 90 TABLET | Refills: 2 | OUTPATIENT
Start: 2024-04-11

## 2024-06-04 DIAGNOSIS — M79.7 FIBROMYALGIA: ICD-10-CM

## 2024-06-06 RX ORDER — PREGABALIN 100 MG/1
CAPSULE ORAL
Qty: 60 CAPSULE | Refills: 0 | OUTPATIENT
Start: 2024-06-06

## 2024-07-22 RX ORDER — OXYBUTYNIN CHLORIDE 10 MG/1
TABLET, EXTENDED RELEASE ORAL
Qty: 90 TABLET | Refills: 1 | OUTPATIENT
Start: 2024-07-22

## 2024-07-25 ENCOUNTER — ANESTHESIA EVENT (OUTPATIENT)
Facility: HOSPITAL | Age: 64
End: 2024-07-25
Payer: MEDICARE

## 2024-07-25 ENCOUNTER — HOSPITAL ENCOUNTER (OUTPATIENT)
Facility: HOSPITAL | Age: 64
Setting detail: OUTPATIENT SURGERY
Discharge: HOME | End: 2024-07-25
Attending: INTERNAL MEDICINE | Admitting: INTERNAL MEDICINE
Payer: MEDICARE

## 2024-07-25 ENCOUNTER — ANESTHESIA (OUTPATIENT)
Facility: HOSPITAL | Age: 64
End: 2024-07-25
Payer: MEDICARE

## 2024-07-25 VITALS
WEIGHT: 240 LBS | RESPIRATION RATE: 11 BRPM | BODY MASS INDEX: 39.99 KG/M2 | SYSTOLIC BLOOD PRESSURE: 135 MMHG | TEMPERATURE: 97.9 F | DIASTOLIC BLOOD PRESSURE: 94 MMHG | HEART RATE: 71 BPM | HEIGHT: 65 IN | OXYGEN SATURATION: 99 %

## 2024-07-25 LAB
EKG ATRIAL RATE: 73 BPM
EKG DIAGNOSIS: NORMAL
EKG P AXIS: 46 DEGREES
EKG P-R INTERVAL: 178 MS
EKG Q-T INTERVAL: 432 MS
EKG QRS DURATION: 82 MS
EKG QTC CALCULATION (BAZETT): 475 MS
EKG R AXIS: 6 DEGREES
EKG T AXIS: 76 DEGREES
EKG VENTRICULAR RATE: 73 BPM

## 2024-07-25 PROCEDURE — 6360000002 HC RX W HCPCS: Performed by: NURSE ANESTHETIST, CERTIFIED REGISTERED

## 2024-07-25 PROCEDURE — 3600007512: Performed by: INTERNAL MEDICINE

## 2024-07-25 PROCEDURE — 88305 TISSUE EXAM BY PATHOLOGIST: CPT

## 2024-07-25 PROCEDURE — 3700000001 HC ADD 15 MINUTES (ANESTHESIA): Performed by: INTERNAL MEDICINE

## 2024-07-25 PROCEDURE — 93005 ELECTROCARDIOGRAM TRACING: CPT | Performed by: ANESTHESIOLOGY

## 2024-07-25 PROCEDURE — 7100000010 HC PHASE II RECOVERY - FIRST 15 MIN: Performed by: INTERNAL MEDICINE

## 2024-07-25 PROCEDURE — 3600007502: Performed by: INTERNAL MEDICINE

## 2024-07-25 PROCEDURE — 2580000003 HC RX 258: Performed by: NURSE ANESTHETIST, CERTIFIED REGISTERED

## 2024-07-25 PROCEDURE — 7100000011 HC PHASE II RECOVERY - ADDTL 15 MIN: Performed by: INTERNAL MEDICINE

## 2024-07-25 PROCEDURE — 3700000000 HC ANESTHESIA ATTENDED CARE: Performed by: INTERNAL MEDICINE

## 2024-07-25 PROCEDURE — 2709999900 HC NON-CHARGEABLE SUPPLY: Performed by: INTERNAL MEDICINE

## 2024-07-25 PROCEDURE — 2720000010 HC SURG SUPPLY STERILE: Performed by: INTERNAL MEDICINE

## 2024-07-25 PROCEDURE — 93010 ELECTROCARDIOGRAM REPORT: CPT | Performed by: SPECIALIST

## 2024-07-25 PROCEDURE — 2500000003 HC RX 250 WO HCPCS: Performed by: NURSE ANESTHETIST, CERTIFIED REGISTERED

## 2024-07-25 RX ORDER — 0.9 % SODIUM CHLORIDE 0.9 %
INTRAVENOUS SOLUTION INTRAVENOUS PRN
Status: DISCONTINUED | OUTPATIENT
Start: 2024-07-25 | End: 2024-07-25 | Stop reason: SDUPTHER

## 2024-07-25 RX ORDER — SODIUM CHLORIDE 0.9 % (FLUSH) 0.9 %
5-40 SYRINGE (ML) INJECTION PRN
Status: DISCONTINUED | OUTPATIENT
Start: 2024-07-25 | End: 2024-07-25 | Stop reason: HOSPADM

## 2024-07-25 RX ORDER — LIDOCAINE HYDROCHLORIDE 20 MG/ML
INJECTION, SOLUTION EPIDURAL; INFILTRATION; INTRACAUDAL; PERINEURAL PRN
Status: DISCONTINUED | OUTPATIENT
Start: 2024-07-25 | End: 2024-07-25 | Stop reason: SDUPTHER

## 2024-07-25 RX ORDER — SODIUM CHLORIDE 9 MG/ML
25 INJECTION, SOLUTION INTRAVENOUS PRN
Status: DISCONTINUED | OUTPATIENT
Start: 2024-07-25 | End: 2024-07-25 | Stop reason: HOSPADM

## 2024-07-25 RX ORDER — SODIUM CHLORIDE 9 MG/ML
INJECTION, SOLUTION INTRAVENOUS CONTINUOUS
Status: DISCONTINUED | OUTPATIENT
Start: 2024-07-25 | End: 2024-07-25 | Stop reason: HOSPADM

## 2024-07-25 RX ORDER — FAMOTIDINE 20 MG/1
TABLET, FILM COATED ORAL
COMMUNITY
Start: 2024-07-22

## 2024-07-25 RX ORDER — GUAIFENESIN 600 MG/1
600 TABLET, EXTENDED RELEASE ORAL
COMMUNITY
Start: 2024-06-09

## 2024-07-25 RX ORDER — SODIUM CHLORIDE 0.9 % (FLUSH) 0.9 %
5-40 SYRINGE (ML) INJECTION EVERY 12 HOURS SCHEDULED
Status: DISCONTINUED | OUTPATIENT
Start: 2024-07-25 | End: 2024-07-25 | Stop reason: HOSPADM

## 2024-07-25 RX ADMIN — PROPOFOL 50 MG: 10 INJECTION, EMULSION INTRAVENOUS at 11:24

## 2024-07-25 RX ADMIN — PROPOFOL 50 MG: 10 INJECTION, EMULSION INTRAVENOUS at 11:28

## 2024-07-25 RX ADMIN — PROPOFOL 100 MG: 10 INJECTION, EMULSION INTRAVENOUS at 11:20

## 2024-07-25 RX ADMIN — PROPOFOL 50 MG: 10 INJECTION, EMULSION INTRAVENOUS at 11:26

## 2024-07-25 RX ADMIN — LIDOCAINE HYDROCHLORIDE 40 MG: 20 INJECTION, SOLUTION EPIDURAL; INFILTRATION; INTRACAUDAL; PERINEURAL at 11:21

## 2024-07-25 RX ADMIN — SODIUM CHLORIDE 250 ML: 9 INJECTION, SOLUTION INTRAVENOUS at 11:19

## 2024-07-25 ASSESSMENT — PAIN - FUNCTIONAL ASSESSMENT: PAIN_FUNCTIONAL_ASSESSMENT: NONE - DENIES PAIN

## 2024-07-25 ASSESSMENT — ENCOUNTER SYMPTOMS: SHORTNESS OF BREATH: 1

## 2024-07-25 ASSESSMENT — PAIN SCALES - GENERAL: PAINLEVEL_OUTOF10: 0

## 2024-07-25 NOTE — DISCHARGE INSTRUCTIONS
[facility]  [facility]  MAITE Lott MD  [phone]      July 25, 2024     Jo Wall  YOB: 1960    ENDOSCOPY DISCHARGE INSTRUCTIONS    If there is redness at IV site you should apply warm compress to area.  If redness or soreness persist contact Dr. Lott or your primary care doctor.    Gaseous discomfort may develop, but walking, belching will help relieve this.  You may not operate a vehicle for 12 hours  You may not operate machinery or dangerous appliances for rest of today  You may not drink alcoholic beverages for 12 hours  Avoid making any critical decisions for 24 hours    DIET:  You may resume your normal diet, but some patients find that heavy or large meals may lead to indigestion or vomiting.  I suggest a light meal as first food intake.    MEDICATIONS:  The use of some over-the-counter pain medication may lead to bleeding after biopsies or other procedures you may have had done.  Tylenol (also called acetaminophen) is safe to take and will not lead to bleeding.  Based on the procedure you had today you may safely take aspirin or aspirin-like products for the next seven (7) days.      ACTIVITY:  You may resume your normal household activities, but it is recommended that you spend the remainder of the day resting -  avoid any strenuous activity.     CALL DR. LOTT'S OFFICE IF:  Increasing pain, nausea, vomiting  Abdominal distension (swelling)  Significant new or increased bleeding (oral or rectal)  Fever/Chills  Chest pain/shortness of breath                   Additional instructions:   Impressions:  EGD: Normal upper endoscopy.  Random biopsies taken from stomach and duodenum.  Colonoscopy: Poor prep.  Procedure aborted in the sigmoid colon.      Recommendations:   Await pathology results.  If pathology is unrevealing then she will need to follow-up with TEMO Hunt for

## 2024-07-25 NOTE — ANESTHESIA POSTPROCEDURE EVALUATION
Department of Anesthesiology  Postprocedure Note    Patient: Jo Wall  MRN: [id number]  YOB: 1960  Date of evaluation: 7/25/2024    Procedure Summary       Date: 07/25/24 Room / Location: [facility] ENDO 04 / [facility] ENDOSCOPY    Anesthesia Start: 1120 Anesthesia Stop: 1141    Procedures:       COLONOSCOPY,      ESOPHAGOGASTRODUODENOSCOPY Diagnosis:       Constipation, unspecified constipation type      Dysphagia, unspecified type      Gastroesophageal reflux disease, unspecified whether esophagitis present      Nausea      NSAID long-term use      Special screening for malignant neoplasms, colon      (Constipation, unspecified constipation type [K59.00])      (Dysphagia, unspecified type [R13.10])      (Gastroesophageal reflux disease, unspecified whether esophagitis present [K21.9])      (Nausea [R11.0])      (NSAID long-term use [Z79.1])      (Special screening for malignant neoplasms, colon [Z12.11])    Surgeons: Christian Lott MD Responsible Provider: Jay Jay Bojorquez MD    Anesthesia Type: MAC ASA Status: 3            Anesthesia Type: MAC    Kyler Phase I: Kyler Score: 10    Kyler Phase II:      Anesthesia Post Evaluation    Patient location during evaluation: PACU  Patient participation: complete - patient participated  Level of consciousness: sleepy but conscious and responsive to verbal stimuli  Pain score: 0  Airway patency: patent  Nausea & Vomiting: no vomiting and no nausea  Cardiovascular status: blood pressure returned to baseline and hemodynamically stable  Respiratory status: acceptable  Hydration status: stable    No notable events documented.

## 2024-07-25 NOTE — PROGRESS NOTES
During pre-interview for endoscopy procedures pt endorses mild chest pressure x \"a few months.\" Pt also endorses SOB when walking and a chronic cough. Anesthesia made aware, 12 lead EKG ordered per anesthesia.    12 lead complete. Anesthesia ok with proceeding    Verified patient name and date of birth, scheduled procedure, and informed consent. Reviewed general discharge instructions and  information.  Assessed patient. Awake, alert, and oriented per baseline. Vital signs stable (see vital sign flowsheet). Respiratory status within defined limits, abdomen soft and non tender. Skin with in defined limits.     Initial RN admission and assessment performed and documented in Endoscopy navigator.     Patient evaluated by anesthesia in pre-procedure holding.     All procedural vital signs, airway assessment, and level of consciousness information monitored and recorded by anesthesia staff on the anesthesia record.     Report received from CRNA post procedure.  Patient transported to recovery area by RN.    Endoscope was pre-cleaned at bedside immediately following procedure by Rosalina.

## 2024-07-25 NOTE — ANESTHESIA PRE PROCEDURE
hypothyroidism (Hashimoto's thyroiditis): arthritis (Primary osteoarthritis of both knees): OA..                  ROS comment: THC edible use    Rosacea    Acquired talipes planus Abdominal:   (+) obese          Vascular:          Other Findings:       Anesthesia Plan      MAC     ASA 3       Induction: intravenous.      Anesthetic plan and risks discussed with patient.      Plan discussed with CRNA.    Attending anesthesiologist reviewed and agrees with Preprocedure content            Jay Jay Bojorquez MD   7/25/2024

## 2024-07-25 NOTE — INTERVAL H&P NOTE
Pre-Endoscopy H&P Update  Chief complaint/HPI/ROS:  The indication for the procedure, the patient's history and the patient's current medications are reviewed prior to the procedure and that data is reported on the H&P to which this document is attached.  Any significant complaints with regard to organ systems will be noted, and if not mentioned then a review of systems is not contributory.  Past Medical History:   Diagnosis Date    Acquired talipes planus, unspecified laterality     Anal spasm 07/14/2021    Anemia     Anxiety     Bronchitis     Chronic pain     neck and back    Contact dermatitis and eczema due to cause     Depression     Dysphagia     Hashimoto's thyroiditis     Headache     Hypercholesterolemia     Hypertensive disorder     Insomnia     Osteoarthritis     Plantar fasciitis     Seasonal allergic reaction     Trauma       Past Surgical History:   Procedure Laterality Date    COLONOSCOPY      GI      Ileostomy and reversal    GYN  2011    endometrioma removal    [place] UNLISTED PROCEDURE ABDOMEN PERITONEUM & OMENTUM       Social   Social History     Tobacco Use    Smoking status: Never    Smokeless tobacco: Never   Substance Use Topics    Alcohol use: Yes     Alcohol/week: 1.0 standard drink of alcohol      Family History   Problem Relation Age of Onset    Headache Mother     COPD Mother     Heart Disease Mother     Migraines Mother     Stroke Mother     COPD Father     Heart Attack Father     Headache Father     Heart Disease Father     Migraines Father     Cancer Sister         Rectal akin cancer    Headache Sister     Migraines Sister     Diabetes Brother     Headache Brother     Breast Cancer Paternal Grandmother     Headache Sister     Migraines Sister       Allergies   Allergen Reactions    Methylprednisolone      Other reaction(s): Unknown (comments)  insomnia    Penicillins Nausea And Vomiting    Silicone Rash      Prior to Admission Medications   Prescriptions Last Dose Informant Patient  procedure.  There were no vitals filed for this visit.  Gen: Appears comfortable, no distress.  Pulm: comfortable respirations with no abnormal audible breath sounds  HEART: well perfused, no abnormal audible heart sounds  GI: abdomen flat.    PLAN:  Informed consent discussion held, patient afforded an opportunity to ask questions and all questions answered.  After being advised of the risks, benefits, and alternatives, the patient requested that we proceed and indicated so on a written consent form.      Will proceed with procedure as planned.  Christian Lott Jr, MD

## 2024-07-25 NOTE — H&P
63 y.o. female presents for diagnostic upper endoscopy for acid reflux, nausea, vomiting as well as surveillance colonoscopy.  Additional H&P data will be attached on the day of procedure.    Christian Lott Jr, MD

## 2024-09-03 ENCOUNTER — APPOINTMENT (OUTPATIENT)
Facility: HOSPITAL | Age: 64
End: 2024-09-03
Payer: MEDICARE

## 2024-09-03 ENCOUNTER — HOSPITAL ENCOUNTER (EMERGENCY)
Facility: HOSPITAL | Age: 64
Discharge: HOME | End: 2024-09-03
Attending: EMERGENCY MEDICINE
Payer: MEDICARE

## 2024-09-03 VITALS
OXYGEN SATURATION: 96 % | WEIGHT: 250 LBS | RESPIRATION RATE: 16 BRPM | HEIGHT: 65 IN | DIASTOLIC BLOOD PRESSURE: 96 MMHG | SYSTOLIC BLOOD PRESSURE: 173 MMHG | TEMPERATURE: 98.5 F | BODY MASS INDEX: 41.65 KG/M2 | HEART RATE: 72 BPM

## 2024-09-03 DIAGNOSIS — B34.9 VIRAL ILLNESS: ICD-10-CM

## 2024-09-03 DIAGNOSIS — R11.2 NAUSEA AND VOMITING, UNSPECIFIED VOMITING TYPE: ICD-10-CM

## 2024-09-03 DIAGNOSIS — R07.9 CHEST PAIN, UNSPECIFIED TYPE: Primary | ICD-10-CM

## 2024-09-03 LAB
ALBUMIN SERPL-MCNC: 4.5 G/DL (ref 3.5–5.2)
ALBUMIN/GLOB SERPL: 1.4 (ref 1.1–2.2)
ALP SERPL-CCNC: 108 U/L (ref 35–104)
ALT SERPL-CCNC: 22 U/L (ref 10–35)
ANION GAP SERPL CALC-SCNC: 15 MMOL/L (ref 5–15)
AST SERPL-CCNC: 31 U/L (ref 10–35)
BASOPHILS # BLD: 0.1 K/UL (ref 0–1)
BASOPHILS NFR BLD: 1 % (ref 0–1)
BILIRUB SERPL-MCNC: 0.6 MG/DL (ref 0.2–1)
BUN SERPL-MCNC: 12 MG/DL (ref 8–23)
BUN/CREAT SERPL: 16 (ref 12–20)
CALCIUM SERPL-MCNC: 9.3 MG/DL (ref 8.8–10.2)
CHLORIDE SERPL-SCNC: 96 MMOL/L (ref 98–107)
CO2 SERPL-SCNC: 23 MMOL/L (ref 22–29)
CREAT SERPL-MCNC: 0.77 MG/DL (ref 0.5–0.9)
DIFFERENTIAL METHOD BLD: ABNORMAL
EOSINOPHIL # BLD: 0 K/UL (ref 0–0.4)
EOSINOPHIL NFR BLD: 0 %
ERYTHROCYTE [DISTWIDTH] IN BLOOD BY AUTOMATED COUNT: 13.6 % (ref 11.5–14.5)
FLUAV RNA SPEC QL NAA+PROBE: NOT DETECTED
FLUBV RNA SPEC QL NAA+PROBE: NOT DETECTED
GLOBULIN SER CALC-MCNC: 3.3 G/DL (ref 2–4)
GLUCOSE SERPL-MCNC: 138 MG/DL (ref 65–100)
HCT VFR BLD AUTO: 44.3 % (ref 35–47)
HGB BLD-MCNC: 15.3 G/DL (ref 11.5–16)
IMM GRANULOCYTES # BLD AUTO: 0.1 K/UL (ref 0–0.04)
IMM GRANULOCYTES NFR BLD AUTO: 1 % (ref 0–0.5)
LIPASE SERPL-CCNC: 19 U/L (ref 13–60)
LYMPHOCYTES # BLD: 0.9 K/UL (ref 0.8–3.5)
LYMPHOCYTES NFR BLD: 9 % (ref 12–49)
MCH RBC QN AUTO: 29.4 PG (ref 26–34)
MCHC RBC AUTO-ENTMCNC: 34.5 G/DL (ref 30–36.5)
MCV RBC AUTO: 85 FL (ref 80–99)
MONOCYTES # BLD: 0.3 K/UL (ref 0–1)
MONOCYTES NFR BLD: 3 % (ref 5–13)
NEUTS SEG # BLD: 8.6 K/UL (ref 1.8–8)
NEUTS SEG NFR BLD: 86 % (ref 32–75)
NRBC # BLD: 0 K/UL (ref 0–0.01)
NRBC BLD-RTO: 0 PER 100 WBC
PLATELET # BLD AUTO: 282 K/UL (ref 150–400)
PMV BLD AUTO: 10 FL (ref 8.9–12.9)
POTASSIUM SERPL-SCNC: 4.2 MMOL/L (ref 3.5–5.1)
PROT SERPL-MCNC: 7.8 G/DL (ref 6.4–8.3)
RBC # BLD AUTO: 5.21 M/UL (ref 3.8–5.2)
SARS-COV-2 RNA RESP QL NAA+PROBE: NOT DETECTED
SODIUM SERPL-SCNC: 134 MMOL/L (ref 136–145)
SOURCE: NORMAL
TROPONIN T SERPL HS-MCNC: <6 NG/L (ref 0–14)
WBC # BLD AUTO: 10 K/UL (ref 3.6–11)

## 2024-09-03 PROCEDURE — 96374 THER/PROPH/DIAG INJ IV PUSH: CPT

## 2024-09-03 PROCEDURE — 93005 ELECTROCARDIOGRAM TRACING: CPT | Performed by: EMERGENCY MEDICINE

## 2024-09-03 PROCEDURE — 99285 EMERGENCY DEPT VISIT HI MDM: CPT

## 2024-09-03 PROCEDURE — 87636 SARSCOV2 & INF A&B AMP PRB: CPT

## 2024-09-03 PROCEDURE — 83690 ASSAY OF LIPASE: CPT

## 2024-09-03 PROCEDURE — 71045 X-RAY EXAM CHEST 1 VIEW: CPT

## 2024-09-03 PROCEDURE — 2580000003 HC RX 258: Performed by: EMERGENCY MEDICINE

## 2024-09-03 PROCEDURE — 6360000002 HC RX W HCPCS: Performed by: EMERGENCY MEDICINE

## 2024-09-03 PROCEDURE — 96361 HYDRATE IV INFUSION ADD-ON: CPT

## 2024-09-03 PROCEDURE — 85025 COMPLETE CBC W/AUTO DIFF WBC: CPT

## 2024-09-03 PROCEDURE — 80053 COMPREHEN METABOLIC PANEL: CPT

## 2024-09-03 PROCEDURE — 84484 ASSAY OF TROPONIN QUANT: CPT

## 2024-09-03 PROCEDURE — 36415 COLL VENOUS BLD VENIPUNCTURE: CPT

## 2024-09-03 RX ORDER — 0.9 % SODIUM CHLORIDE 0.9 %
1000 INTRAVENOUS SOLUTION INTRAVENOUS ONCE
Status: COMPLETED | OUTPATIENT
Start: 2024-09-03 | End: 2024-09-03

## 2024-09-03 RX ORDER — ONDANSETRON 4 MG/1
4 TABLET, ORALLY DISINTEGRATING ORAL 3 TIMES DAILY PRN
Qty: 12 TABLET | Refills: 0 | Status: SHIPPED | OUTPATIENT
Start: 2024-09-03

## 2024-09-03 RX ORDER — OXYBUTYNIN CHLORIDE 10 MG/1
TABLET, EXTENDED RELEASE ORAL
Qty: 90 TABLET | Refills: 1 | OUTPATIENT
Start: 2024-09-03

## 2024-09-03 RX ORDER — ONDANSETRON 2 MG/ML
4 INJECTION INTRAMUSCULAR; INTRAVENOUS ONCE
Status: COMPLETED | OUTPATIENT
Start: 2024-09-03 | End: 2024-09-03

## 2024-09-03 RX ADMIN — SODIUM CHLORIDE 1000 ML: 9 INJECTION, SOLUTION INTRAVENOUS at 12:06

## 2024-09-03 RX ADMIN — ONDANSETRON 4 MG: 2 INJECTION INTRAMUSCULAR; INTRAVENOUS at 12:11

## 2024-09-03 ASSESSMENT — PAIN DESCRIPTION - LOCATION: LOCATION: HEAD;GENERALIZED

## 2024-09-03 ASSESSMENT — LIFESTYLE VARIABLES
HOW OFTEN DO YOU HAVE A DRINK CONTAINING ALCOHOL: 2-4 TIMES A MONTH
HOW MANY STANDARD DRINKS CONTAINING ALCOHOL DO YOU HAVE ON A TYPICAL DAY: 1 OR 2

## 2024-09-03 ASSESSMENT — PAIN SCALES - GENERAL: PAINLEVEL_OUTOF10: 8

## 2024-09-03 ASSESSMENT — PAIN - FUNCTIONAL ASSESSMENT: PAIN_FUNCTIONAL_ASSESSMENT: 0-10

## 2024-09-03 NOTE — ED PROVIDER NOTES
[facility] [place] EMERGENCY DEPT  EMERGENCY DEPARTMENT ENCOUNTER      Pt Name: Jo Wall  MRN: [id number]  Birthdate 1960  Date of evaluation: 9/3/2024  Provider: Leon Ma MD    CHIEF COMPLAINT     No chief complaint on file.        HISTORY OF PRESENT ILLNESS   (Location/Symptom, Timing/Onset, Context/Setting, Quality, Duration, Modifying Factors, Severity)  Note limiting factors.   63-year-old with a history of hypertension, hyperlipidemia, anemia, asthma, chronic fatigue, chronic neck and back pain, depression, fibromyalgia, Hashimoto's thyroiditis, osteoarthritis, sleep apnea, anxiety.  She presents with multiple complaints.  She states that yesterday she developed headache, body aches, nausea, vomiting x 8, fatigue, loss of appetite, poor p.o. intake, pressure in her chest, wheezing.  She denies any known sick contacts.  No fever.  No dyspnea.          Review of External Medical Records:     Nursing Notes were reviewed.    REVIEW OF SYSTEMS    (2-9 systems for level 4, 10 or more for level 5)     Review of Systems    Except as noted above the remainder of the review of systems was reviewed and negative.       PAST MEDICAL HISTORY     Past Medical History:   Diagnosis Date    Acquired talipes planus, unspecified laterality     Anal spasm 07/14/2021    Anemia     Anxiety     Asthma     Bronchitis     Chronic cough     x 4 months per pt 7/25/24    Chronic fatigue     Chronic pain     neck and back    Contact dermatitis and eczema due to cause     Depression     Dysphagia     Fibromyalgia     Hashimoto's thyroiditis     Headache     Hypercholesterolemia     Hypertension     Hypertensive disorder     Insomnia     Osteoarthritis     Plantar fasciitis     Seasonal allergic reaction     Sleep apnea     does not use cpap    Trauma          SURGICAL HISTORY       Past Surgical History:   Procedure Laterality Date    COLONOSCOPY      COLONOSCOPY N/A 7/25/2024    COLONOSCOPY, performed by Christian Lott MD at    Leon Ma MD  12:47 PM        CONSULTS:  None    PROCEDURES:  Unless otherwise noted below, none     Procedures      FINAL IMPRESSION      Assessment/plan: 63-year-old with a history of hypertension, hyperlipidemia, anemia, asthma, chronic fatigue, chronic neck and back pain, depression, fibromyalgia, Hashimoto's, osteoarthritis, sleep apnea, anxiety.  She presents with multiple complaints including headache, body aches, nausea, vomiting, fatigue, loss of appetite, poor p.o. intake, chest pressure, wheezing.  Differential diagnosis includes viral illness (which I suspect), meningitis, ACS, PE, aortic dissection, pneumonia, pneumothorax, pleurisy, pericarditis, GERD, musculoskeletal pain, and others.  Reassuring appearance/exam with stable vital signs.  CBC, CMP, lipase, troponin, EKG, chest x-ray okay.  Home with recommendations of rest, fluids, Tylenol.  Zofran as needed.  PCP follow-up.  Return precautions.  Leon Ma MD  12:48 PM      DISPOSITION/PLAN   DISPOSITION        PATIENT REFERRED TO:  No follow-up provider specified.    DISCHARGE MEDICATIONS:  New Prescriptions    No medications on file         (Please note that portions of this note were completed with a voice recognition program.  Efforts were made to edit the dictations but occasionally words are mis-transcribed.)    Leon Ma MD (electronically signed)  Emergency Attending Physician / Physician Assistant / Nurse Practitioner             Leon Ma MD  09/03/24 0218

## 2024-09-03 NOTE — ED TRIAGE NOTES
Pt here with headache, body aches, nauseated and fatigue and loss of appetite x few days. No other sick contacts. Pt has not taken anything for her symptoms.

## 2024-09-04 LAB
EKG ATRIAL RATE: 73 BPM
EKG DIAGNOSIS: NORMAL
EKG P AXIS: 59 DEGREES
EKG P-R INTERVAL: 190 MS
EKG Q-T INTERVAL: 442 MS
EKG QRS DURATION: 86 MS
EKG QTC CALCULATION (BAZETT): 486 MS
EKG R AXIS: 24 DEGREES
EKG T AXIS: 74 DEGREES
EKG VENTRICULAR RATE: 73 BPM

## 2024-09-04 PROCEDURE — 93010 ELECTROCARDIOGRAM REPORT: CPT | Performed by: SPECIALIST

## 2024-09-28 ENCOUNTER — HOSPITAL ENCOUNTER (EMERGENCY)
Facility: HOSPITAL | Age: 64
Discharge: HOME | End: 2024-09-28
Attending: EMERGENCY MEDICINE
Payer: MEDICARE

## 2024-09-28 VITALS
OXYGEN SATURATION: 93 % | HEIGHT: 65 IN | DIASTOLIC BLOOD PRESSURE: 115 MMHG | TEMPERATURE: 98.7 F | WEIGHT: 250 LBS | BODY MASS INDEX: 41.65 KG/M2 | RESPIRATION RATE: 16 BRPM | SYSTOLIC BLOOD PRESSURE: 182 MMHG | HEART RATE: 94 BPM

## 2024-09-28 DIAGNOSIS — R35.0 URINARY FREQUENCY: ICD-10-CM

## 2024-09-28 DIAGNOSIS — J01.11 ACUTE RECURRENT FRONTAL SINUSITIS: Primary | ICD-10-CM

## 2024-09-28 LAB
APPEARANCE UR: ABNORMAL
BACTERIA URNS QL MICRO: ABNORMAL /HPF
BILIRUB UR QL: NEGATIVE
COLOR UR: ABNORMAL
EPITH CASTS URNS QL MICRO: ABNORMAL /LPF
GLUCOSE UR STRIP.AUTO-MCNC: NEGATIVE MG/DL
HGB UR QL STRIP: NEGATIVE
KETONES UR QL STRIP.AUTO: NEGATIVE MG/DL
LEUKOCYTE ESTERASE UR QL STRIP.AUTO: ABNORMAL
NITRITE UR QL STRIP.AUTO: NEGATIVE
PH UR STRIP: 6 (ref 5–8)
PROT UR STRIP-MCNC: NEGATIVE MG/DL
RBC #/AREA URNS HPF: ABNORMAL /HPF
SP GR UR REFRACTOMETRY: 1.02 (ref 1–1.03)
URINE CULTURE IF INDICATED: ABNORMAL
UROBILINOGEN UR QL STRIP.AUTO: 0.2 EU/DL (ref 0.2–1)
WBC URNS QL MICRO: ABNORMAL /HPF (ref 0–4)

## 2024-09-28 PROCEDURE — 99283 EMERGENCY DEPT VISIT LOW MDM: CPT

## 2024-09-28 PROCEDURE — 81001 URINALYSIS AUTO W/SCOPE: CPT

## 2024-09-28 RX ORDER — GUAIFENESIN 600 MG/1
600 TABLET, EXTENDED RELEASE ORAL 2 TIMES DAILY
Qty: 30 TABLET | Refills: 0 | Status: SHIPPED | OUTPATIENT
Start: 2024-09-28 | End: 2024-10-13

## 2024-09-28 RX ORDER — FLUCONAZOLE 150 MG/1
150 TABLET ORAL ONCE
Qty: 1 TABLET | Refills: 0 | Status: SHIPPED | OUTPATIENT
Start: 2024-09-28 | End: 2024-09-28

## 2024-09-28 RX ORDER — PREDNISONE 50 MG/1
50 TABLET ORAL DAILY
Qty: 5 TABLET | Refills: 0 | Status: SHIPPED | OUTPATIENT
Start: 2024-09-28 | End: 2024-10-03

## 2024-09-28 ASSESSMENT — PAIN - FUNCTIONAL ASSESSMENT
PAIN_FUNCTIONAL_ASSESSMENT: ACTIVITIES ARE NOT PREVENTED
PAIN_FUNCTIONAL_ASSESSMENT: 0-10

## 2024-09-28 ASSESSMENT — PAIN DESCRIPTION - LOCATION: LOCATION: FACE

## 2024-09-28 ASSESSMENT — LIFESTYLE VARIABLES
HOW MANY STANDARD DRINKS CONTAINING ALCOHOL DO YOU HAVE ON A TYPICAL DAY: 1 OR 2
HOW OFTEN DO YOU HAVE A DRINK CONTAINING ALCOHOL: MONTHLY OR LESS

## 2024-09-28 NOTE — ED PROVIDER NOTES
[facility] [place] EMERGENCY DEPT  EMERGENCY DEPARTMENT ENCOUNTER      Pt Name: Jo Wall  MRN: [id number]  Birthdate 1960  Date of evaluation: 9/28/2024  Provider: Satya Dexter MD      HISTORY OF PRESENT ILLNESS      63-year-old female with history of fibromyalgia, chronic sinusitis who has had sinus surgery presents to the emergency department chief complaint of recurrent sinusitis.  She has been dealing with worsening sinusitis for the last 5 to 6 weeks.    The history is provided by the patient and medical records.           Nursing Notes were reviewed.    REVIEW OF SYSTEMS         Review of Systems        PAST MEDICAL HISTORY     Past Medical History:   Diagnosis Date    Acquired talipes planus, unspecified laterality     Anal spasm 07/14/2021    Anemia     Anxiety     Asthma     Bronchitis     Chronic cough     x 4 months per pt 7/25/24    Chronic fatigue     Chronic pain     neck and back    Contact dermatitis and eczema due to cause     Depression     Dysphagia     Fibromyalgia     Hashimoto's thyroiditis     Headache     Hypercholesterolemia     Hypertension     Hypertensive disorder     Insomnia     Osteoarthritis     Plantar fasciitis     Seasonal allergic reaction     Sleep apnea     does not use cpap    Trauma          SURGICAL HISTORY       Past Surgical History:   Procedure Laterality Date    COLONOSCOPY      COLONOSCOPY N/A 7/25/2024    COLONOSCOPY, performed by Christian Lott MD at [facility] ENDOSCOPY    GI      Ileostomy and reversal    GYN  2011    endometrioma removal    [place] UNLISTED PROCEDURE ABDOMEN PERITONEUM & OMENTUM      UPPER GASTROINTESTINAL ENDOSCOPY N/A 7/25/2024    ESOPHAGOGASTRODUODENOSCOPY performed by Christian Lott MD at [facility] ENDOSCOPY         CURRENT MEDICATIONS       Previous Medications    ATORVASTATIN (LIPITOR) 10 MG TABLET    TAKE ONE TABLET BY MOUTH ONE TIME DAILY    BENZONATATE (TESSALON) 200 MG CAPSULE    TAKE ONE CAPSULE BY MOUTH THREE TIMES A DAY AS NEEDED FOR      Headache Father     Heart Disease Father     Migraines Father     Cancer Sister         Rectal akin cancer    Headache Sister     Migraines Sister     Diabetes Brother     Headache Brother     Breast Cancer Paternal Grandmother     Headache Sister     Migraines Sister           SOCIAL HISTORY       Social History     Socioeconomic History    Marital status:    Tobacco Use    Smoking status: Never    Smokeless tobacco: Never   Vaping Use    Vaping status: Never Used   Substance and Sexual Activity    Alcohol use: Yes     Alcohol/week: 1.0 standard drink of alcohol     Comment: occasional    Drug use: Yes     Frequency: 10.0 times per week     Types: Marijuana (Weed)     Comment: edibles     Social Determinants of Health     Financial Resource Strain: Low Risk  (6/6/2023)    Overall Financial Resource Strain (CARDIA)     Difficulty of Paying Living Expenses: Not hard at all   Transportation Needs: Unknown (6/6/2023)    PRAPARE - Transportation     Lack of Transportation (Non-Medical): No   Housing Stability: Unknown (6/6/2023)    Housing Stability Vital Sign     Unstable Housing in the Last Year: No         PHYSICAL EXAM       ED Triage Vitals   BP Systolic BP Percentile Diastolic BP Percentile Temp Temp src Pulse Resp SpO2   -- -- -- -- -- -- -- --      Height Weight         -- --             There is no height or weight on file to calculate BMI.    Physical Exam  Vitals and nursing note reviewed.   Constitutional:       Appearance: Normal appearance.   Cardiovascular:      Rate and Rhythm: Normal rate.   Pulmonary:      Effort: Pulmonary effort is normal. No respiratory distress.   Neurological:      Mental Status: She is alert.             EMERGENCY DEPARTMENT COURSE and DIFFERENTIAL DIAGNOSIS/MDM:   Vitals:  There were no vitals filed for this visit.      Medical Decision Making  63-year-old female presents to the emergency department above with concern for sinusitis.  Also notes urinary frequency and

## 2024-09-28 NOTE — ED TRIAGE NOTES
Pt c/o sinus infection with facial/head pressure and nasal congestion.  Pt states she has had some urinary frequency x3 weeks.   Patient arrives to ED ambulatory w/o difficulty. No acute distress noted in triage. A&O x 4. Skin is warm, dry & intact on obs.

## 2024-11-20 ENCOUNTER — OFFICE VISIT (OUTPATIENT)
Facility: CLINIC | Age: 64
End: 2024-11-20

## 2024-11-20 VITALS
HEART RATE: 67 BPM | DIASTOLIC BLOOD PRESSURE: 78 MMHG | WEIGHT: 239 LBS | TEMPERATURE: 98.1 F | OXYGEN SATURATION: 97 % | HEIGHT: 65 IN | BODY MASS INDEX: 39.82 KG/M2 | SYSTOLIC BLOOD PRESSURE: 122 MMHG

## 2024-11-20 DIAGNOSIS — F51.01 PRIMARY INSOMNIA: ICD-10-CM

## 2024-11-20 DIAGNOSIS — F41.9 ANXIETY: ICD-10-CM

## 2024-11-20 DIAGNOSIS — G93.32 CFS (CHRONIC FATIGUE SYNDROME): ICD-10-CM

## 2024-11-20 DIAGNOSIS — J45.20 MILD INTERMITTENT ASTHMA, UNSPECIFIED WHETHER COMPLICATED: ICD-10-CM

## 2024-11-20 DIAGNOSIS — Z12.4 CERVICAL CANCER SCREENING: ICD-10-CM

## 2024-11-20 DIAGNOSIS — E06.3 HASHIMOTO'S THYROIDITIS: ICD-10-CM

## 2024-11-20 DIAGNOSIS — Z23 IMMUNIZATION DUE: ICD-10-CM

## 2024-11-20 DIAGNOSIS — M79.7 FIBROMYALGIA: ICD-10-CM

## 2024-11-20 DIAGNOSIS — Z71.3 ENCOUNTER FOR DIETARY COUNSELING AND SURVEILLANCE: ICD-10-CM

## 2024-11-20 DIAGNOSIS — E55.9 VITAMIN D DEFICIENCY: ICD-10-CM

## 2024-11-20 DIAGNOSIS — E78.00 HYPERCHOLESTEROLEMIA: ICD-10-CM

## 2024-11-20 DIAGNOSIS — M25.561 CHRONIC PAIN OF BOTH KNEES: ICD-10-CM

## 2024-11-20 DIAGNOSIS — Z12.11 ENCOUNTER FOR COLORECTAL CANCER SCREENING: ICD-10-CM

## 2024-11-20 DIAGNOSIS — R73.09 ELEVATED GLUCOSE: ICD-10-CM

## 2024-11-20 DIAGNOSIS — J30.9 ALLERGIC RHINITIS, UNSPECIFIED SEASONALITY, UNSPECIFIED TRIGGER: ICD-10-CM

## 2024-11-20 DIAGNOSIS — N32.81 OVERACTIVE BLADDER: ICD-10-CM

## 2024-11-20 DIAGNOSIS — I10 PRIMARY HYPERTENSION: Primary | ICD-10-CM

## 2024-11-20 DIAGNOSIS — M17.0 PRIMARY OSTEOARTHRITIS OF BOTH KNEES: ICD-10-CM

## 2024-11-20 DIAGNOSIS — F32.A DEPRESSION, UNSPECIFIED DEPRESSION TYPE: ICD-10-CM

## 2024-11-20 DIAGNOSIS — M25.562 CHRONIC PAIN OF BOTH KNEES: ICD-10-CM

## 2024-11-20 DIAGNOSIS — Z12.31 BREAST CANCER SCREENING BY MAMMOGRAM: ICD-10-CM

## 2024-11-20 DIAGNOSIS — G43.009 MIGRAINE WITHOUT AURA AND WITHOUT STATUS MIGRAINOSUS, NOT INTRACTABLE: ICD-10-CM

## 2024-11-20 DIAGNOSIS — Z12.12 ENCOUNTER FOR COLORECTAL CANCER SCREENING: ICD-10-CM

## 2024-11-20 DIAGNOSIS — G89.29 CHRONIC PAIN OF BOTH KNEES: ICD-10-CM

## 2024-11-20 DIAGNOSIS — G47.33 OSA ON CPAP: ICD-10-CM

## 2024-11-20 PROBLEM — E11.9 TYPE 2 DIABETES MELLITUS TREATED WITH INSULIN (HCC): Status: ACTIVE | Noted: 2024-11-20

## 2024-11-20 PROBLEM — Z79.4 TYPE 2 DIABETES MELLITUS TREATED WITH INSULIN (HCC): Status: ACTIVE | Noted: 2024-11-20

## 2024-11-20 RX ORDER — HYDROXYZINE HYDROCHLORIDE 25 MG/1
25 TABLET, FILM COATED ORAL EVERY 8 HOURS PRN
Qty: 60 TABLET | Refills: 0 | Status: SHIPPED | OUTPATIENT
Start: 2024-11-20 | End: 2024-11-20

## 2024-11-20 RX ORDER — HYDROXYZINE HYDROCHLORIDE 25 MG/1
25 TABLET, FILM COATED ORAL EVERY 8 HOURS PRN
Qty: 60 TABLET | Refills: 0 | Status: SHIPPED | OUTPATIENT
Start: 2024-11-20 | End: 2024-12-20

## 2024-11-20 ASSESSMENT — PATIENT HEALTH QUESTIONNAIRE - PHQ9
SUM OF ALL RESPONSES TO PHQ QUESTIONS 1-9: 4
1. LITTLE INTEREST OR PLEASURE IN DOING THINGS: MORE THAN HALF THE DAYS
SUM OF ALL RESPONSES TO PHQ QUESTIONS 1-9: 4
SUM OF ALL RESPONSES TO PHQ QUESTIONS 1-9: 7
5. POOR APPETITE OR OVEREATING: SEVERAL DAYS
SUM OF ALL RESPONSES TO PHQ9 QUESTIONS 1 & 2: 2
4. FEELING TIRED OR HAVING LITTLE ENERGY: SEVERAL DAYS
SUM OF ALL RESPONSES TO PHQ QUESTIONS 1-9: 4
7. TROUBLE CONCENTRATING ON THINGS, SUCH AS READING THE NEWSPAPER OR WATCHING TELEVISION: SEVERAL DAYS
SUM OF ALL RESPONSES TO PHQ9 QUESTIONS 1 & 2: 4
1. LITTLE INTEREST OR PLEASURE IN DOING THINGS: SEVERAL DAYS
SUM OF ALL RESPONSES TO PHQ QUESTIONS 1-9: 8
2. FEELING DOWN, DEPRESSED OR HOPELESS: MORE THAN HALF THE DAYS
3. TROUBLE FALLING OR STAYING ASLEEP: SEVERAL DAYS
2. FEELING DOWN, DEPRESSED OR HOPELESS: SEVERAL DAYS
SUM OF ALL RESPONSES TO PHQ QUESTIONS 1-9: 4
SUM OF ALL RESPONSES TO PHQ QUESTIONS 1-9: 8
6. FEELING BAD ABOUT YOURSELF - OR THAT YOU ARE A FAILURE OR HAVE LET YOURSELF OR YOUR FAMILY DOWN: SEVERAL DAYS
SUM OF ALL RESPONSES TO PHQ QUESTIONS 1-9: 8
9. THOUGHTS THAT YOU WOULD BE BETTER OFF DEAD, OR OF HURTING YOURSELF: SEVERAL DAYS

## 2024-11-20 ASSESSMENT — ENCOUNTER SYMPTOMS
WHEEZING: 0
ABDOMINAL DISTENTION: 0
SHORTNESS OF BREATH: 0
SORE THROAT: 0
COUGH: 0
DIARRHEA: 0
NAUSEA: 0
ABDOMINAL PAIN: 0
CONSTIPATION: 0
VOMITING: 0

## 2024-11-20 NOTE — PROGRESS NOTES
Chief Complaint   Patient presents with    New Patient    Anxiety    Other     Both knees hurt wants shots    /78 (Site: Right Upper Arm, Position: Sitting, Cuff Size: Large Adult)   Pulse 67   Temp 98.1 °F (36.7 °C)   Ht 1.651 m (5' 5\")   Wt 108.4 kg (239 lb)   SpO2 97%   BMI 39.77 kg/m²  1. Have you been to the ER, urgent care clinic since your last visit?  Hospitalized since your last visit?No    2. Have you seen or consulted any other health care providers outside of the [facility] since your last visit?  Include any pap smears or colon screening. No   
note.  -- Elsa Sanchez MD     CPT code 53257

## 2024-12-03 ENCOUNTER — TELEPHONE (OUTPATIENT)
Age: 64
End: 2024-12-03

## 2024-12-03 DIAGNOSIS — M25.561 PAIN IN BOTH KNEES, UNSPECIFIED CHRONICITY: Primary | ICD-10-CM

## 2024-12-03 DIAGNOSIS — M25.562 PAIN IN BOTH KNEES, UNSPECIFIED CHRONICITY: Primary | ICD-10-CM

## 2024-12-03 NOTE — TELEPHONE ENCOUNTER
Patient left voicemail with office on 11/26/2024 to schedule an appointment for a medication review. Returned patient's call and left voicemail to call office to schedule.

## 2024-12-09 DIAGNOSIS — M79.7 FIBROMYALGIA: ICD-10-CM

## 2024-12-09 NOTE — TELEPHONE ENCOUNTER
The doctor that normally prescribed her Lyrica has left the practice. Wants to know if Dr. Sanchez will refill?

## 2024-12-10 RX ORDER — PREGABALIN 100 MG/1
CAPSULE ORAL
Qty: 60 CAPSULE | Refills: 0 | OUTPATIENT
Start: 2024-12-10 | End: 2025-01-09

## 2024-12-20 PROBLEM — Z12.4 CERVICAL CANCER SCREENING: Status: RESOLVED | Noted: 2024-11-20 | Resolved: 2024-12-20

## 2024-12-20 PROBLEM — Z12.12 ENCOUNTER FOR COLORECTAL CANCER SCREENING: Status: RESOLVED | Noted: 2024-11-20 | Resolved: 2024-12-20

## 2024-12-20 PROBLEM — Z12.11 ENCOUNTER FOR COLORECTAL CANCER SCREENING: Status: RESOLVED | Noted: 2024-11-20 | Resolved: 2024-12-20

## 2025-01-08 ENCOUNTER — TELEPHONE (OUTPATIENT)
Facility: CLINIC | Age: 65
End: 2025-01-08

## 2025-01-08 NOTE — TELEPHONE ENCOUNTER
CRISTÓBAL - Spoke with the patient and she now is seeing another provider and she will send her medication refill request over to her new pcp.

## 2025-01-15 ENCOUNTER — OFFICE VISIT (OUTPATIENT)
Facility: CLINIC | Age: 65
End: 2025-01-15
Payer: MEDICARE

## 2025-01-15 VITALS
WEIGHT: 237 LBS | TEMPERATURE: 98 F | SYSTOLIC BLOOD PRESSURE: 168 MMHG | HEIGHT: 65 IN | HEART RATE: 74 BPM | OXYGEN SATURATION: 97 % | DIASTOLIC BLOOD PRESSURE: 94 MMHG | BODY MASS INDEX: 39.49 KG/M2 | RESPIRATION RATE: 18 BRPM

## 2025-01-15 DIAGNOSIS — G93.32 CFS (CHRONIC FATIGUE SYNDROME): Primary | ICD-10-CM

## 2025-01-15 DIAGNOSIS — G47.33 OSA ON CPAP: ICD-10-CM

## 2025-01-15 DIAGNOSIS — F33.2 MAJOR DEPRESSIVE DISORDER, RECURRENT SEVERE WITHOUT PSYCHOTIC FEATURES (HCC): ICD-10-CM

## 2025-01-15 DIAGNOSIS — M79.7 FIBROMYALGIA: ICD-10-CM

## 2025-01-15 DIAGNOSIS — I20.9 ANGINA PECTORIS (HCC): ICD-10-CM

## 2025-01-15 PROBLEM — J45.909 ASTHMA: Status: ACTIVE | Noted: 2025-01-15

## 2025-01-15 PROBLEM — K59.00 CONSTIPATION: Status: ACTIVE | Noted: 2025-01-15

## 2025-01-15 PROBLEM — Z90.49 HISTORY OF BOWEL RESECTION: Status: ACTIVE | Noted: 2025-01-15

## 2025-01-15 PROCEDURE — 1036F TOBACCO NON-USER: CPT | Performed by: STUDENT IN AN ORGANIZED HEALTH CARE EDUCATION/TRAINING PROGRAM

## 2025-01-15 PROCEDURE — G8417 CALC BMI ABV UP PARAM F/U: HCPCS | Performed by: STUDENT IN AN ORGANIZED HEALTH CARE EDUCATION/TRAINING PROGRAM

## 2025-01-15 PROCEDURE — 99214 OFFICE O/P EST MOD 30 MIN: CPT | Performed by: STUDENT IN AN ORGANIZED HEALTH CARE EDUCATION/TRAINING PROGRAM

## 2025-01-15 PROCEDURE — G8427 DOCREV CUR MEDS BY ELIG CLIN: HCPCS | Performed by: STUDENT IN AN ORGANIZED HEALTH CARE EDUCATION/TRAINING PROGRAM

## 2025-01-15 PROCEDURE — 3080F DIAST BP >= 90 MM HG: CPT | Performed by: STUDENT IN AN ORGANIZED HEALTH CARE EDUCATION/TRAINING PROGRAM

## 2025-01-15 PROCEDURE — 3017F COLORECTAL CA SCREEN DOC REV: CPT | Performed by: STUDENT IN AN ORGANIZED HEALTH CARE EDUCATION/TRAINING PROGRAM

## 2025-01-15 PROCEDURE — 3077F SYST BP >= 140 MM HG: CPT | Performed by: STUDENT IN AN ORGANIZED HEALTH CARE EDUCATION/TRAINING PROGRAM

## 2025-01-15 SDOH — ECONOMIC STABILITY: FOOD INSECURITY: WITHIN THE PAST 12 MONTHS, YOU WORRIED THAT YOUR FOOD WOULD RUN OUT BEFORE YOU GOT MONEY TO BUY MORE.: NEVER TRUE

## 2025-01-15 SDOH — ECONOMIC STABILITY: FOOD INSECURITY: WITHIN THE PAST 12 MONTHS, THE FOOD YOU BOUGHT JUST DIDN'T LAST AND YOU DIDN'T HAVE MONEY TO GET MORE.: NEVER TRUE

## 2025-01-15 ASSESSMENT — ANXIETY QUESTIONNAIRES
3. WORRYING TOO MUCH ABOUT DIFFERENT THINGS: NEARLY EVERY DAY
5. BEING SO RESTLESS THAT IT IS HARD TO SIT STILL: NEARLY EVERY DAY
7. FEELING AFRAID AS IF SOMETHING AWFUL MIGHT HAPPEN: NEARLY EVERY DAY
GAD7 TOTAL SCORE: 21
4. TROUBLE RELAXING: NEARLY EVERY DAY
IF YOU CHECKED OFF ANY PROBLEMS ON THIS QUESTIONNAIRE, HOW DIFFICULT HAVE THESE PROBLEMS MADE IT FOR YOU TO DO YOUR WORK, TAKE CARE OF THINGS AT HOME, OR GET ALONG WITH OTHER PEOPLE: EXTREMELY DIFFICULT
1. FEELING NERVOUS, ANXIOUS, OR ON EDGE: NEARLY EVERY DAY
6. BECOMING EASILY ANNOYED OR IRRITABLE: NEARLY EVERY DAY
2. NOT BEING ABLE TO STOP OR CONTROL WORRYING: NEARLY EVERY DAY

## 2025-01-15 ASSESSMENT — COLUMBIA-SUICIDE SEVERITY RATING SCALE - C-SSRS
1. WITHIN THE PAST MONTH, HAVE YOU WISHED YOU WERE DEAD OR WISHED YOU COULD GO TO SLEEP AND NOT WAKE UP?: YES
6. HAVE YOU EVER DONE ANYTHING, STARTED TO DO ANYTHING, OR PREPARED TO DO ANYTHING TO END YOUR LIFE?: NO
2. HAVE YOU ACTUALLY HAD ANY THOUGHTS OF KILLING YOURSELF?: NO

## 2025-01-15 ASSESSMENT — PATIENT HEALTH QUESTIONNAIRE - PHQ9
3. TROUBLE FALLING OR STAYING ASLEEP: NEARLY EVERY DAY
5. POOR APPETITE OR OVEREATING: NEARLY EVERY DAY
2. FEELING DOWN, DEPRESSED OR HOPELESS: NEARLY EVERY DAY
SUM OF ALL RESPONSES TO PHQ QUESTIONS 1-9: 21
10. IF YOU CHECKED OFF ANY PROBLEMS, HOW DIFFICULT HAVE THESE PROBLEMS MADE IT FOR YOU TO DO YOUR WORK, TAKE CARE OF THINGS AT HOME, OR GET ALONG WITH OTHER PEOPLE: EXTREMELY DIFFICULT
7. TROUBLE CONCENTRATING ON THINGS, SUCH AS READING THE NEWSPAPER OR WATCHING TELEVISION: NEARLY EVERY DAY
8. MOVING OR SPEAKING SO SLOWLY THAT OTHER PEOPLE COULD HAVE NOTICED. OR THE OPPOSITE, BEING SO FIGETY OR RESTLESS THAT YOU HAVE BEEN MOVING AROUND A LOT MORE THAN USUAL: NEARLY EVERY DAY
SUM OF ALL RESPONSES TO PHQ QUESTIONS 1-9: 21
9. THOUGHTS THAT YOU WOULD BE BETTER OFF DEAD, OR OF HURTING YOURSELF: NOT AT ALL
SUM OF ALL RESPONSES TO PHQ QUESTIONS 1-9: 21
4. FEELING TIRED OR HAVING LITTLE ENERGY: NEARLY EVERY DAY
SUM OF ALL RESPONSES TO PHQ QUESTIONS 1-9: 21
6. FEELING BAD ABOUT YOURSELF - OR THAT YOU ARE A FAILURE OR HAVE LET YOURSELF OR YOUR FAMILY DOWN: NEARLY EVERY DAY

## 2025-01-15 NOTE — PROGRESS NOTES
\"Have you been to the ER, urgent care clinic since your last visit?  Hospitalized since your last visit?\"    NO    “Have you seen or consulted any other health care providers outside our system since your last visit?”    NO    Have you had a mammogram?”   NO    Date of last Mammogram: 5/13/2021       “Have you had a diabetic eye exam?”    NO     No diabetic eye exam on file     Chief Complaint   Patient presents with    Fatigue    Shoulder Pain     BP (!) 168/94 (Site: Right Upper Arm, Position: Sitting, Cuff Size: Large Adult)   Pulse 74   Temp 98 °F (36.7 °C) (Temporal)   Resp 18   Ht 1.651 m (5' 5\")   Wt 107.5 kg (237 lb)   SpO2 97%   BMI 39.44 kg/m²            
and holding objects out. The pain is likely due to rotator cuff tendinitis. She is advised to undergo physical therapy for her shoulder. A referral to an orthopedic specialist will be made for further evaluation.    4. Sleep Apnea.  She uses a BiPAP machine at night. She reports unrefreshing sleep despite sleeping 10 hours a night. She is advised to continue using the BiPAP and follow up with her sleep doctor, Dr. Luis Eduardo Davila.    5. Chest Pain.  She reports occasional stabbing chest pain lasting 15-20 seconds, not associated with exertion. Given her risk factors, including diabetes, hypertension, and hyperlipidemia, a stress test will be ordered to evaluate her heart despite Atypical nature of CP    6. Diabetes Mellitus.  She reports a history of diabetes, though recent A1c levels were in the prediabetic range that are on file. She is advised to follow up with her primary care physician for a repeat A1c test and further management.    We discussed that diagnosis could have been made prior when at other FM office?    7. allergies  She is advised to discontinue Zyrtec due to its potential to cause drowsiness and to use Flonase regularly instead.    Aspects of this note have been generated using voice recognition software. Despite editing, there may be some syntax errors.    The patient (or guardian, if applicable) and other individuals in attendance with the patient were advised that Artificial Intelligence will be utilized during this visit to record, process the conversation to generate a clinical note, and support improvement of the AI technology. The patient (or guardian, if applicable) and other individuals in attendance at the appointment consented to the use of AI, including the recording.        Delmis Dorman,

## 2025-01-17 DIAGNOSIS — F32.A DEPRESSION, UNSPECIFIED DEPRESSION TYPE: Primary | ICD-10-CM

## 2025-01-28 RX ORDER — BENZONATATE 200 MG/1
200 CAPSULE ORAL
Qty: 90 CAPSULE | Refills: 1 | OUTPATIENT
Start: 2025-01-28

## 2025-01-28 RX ORDER — DULOXETIN HYDROCHLORIDE 60 MG/1
60 CAPSULE, DELAYED RELEASE ORAL 2 TIMES DAILY
Qty: 180 CAPSULE | Refills: 1 | OUTPATIENT
Start: 2025-01-28

## 2025-01-31 RX ORDER — DULOXETIN HYDROCHLORIDE 60 MG/1
60 CAPSULE, DELAYED RELEASE ORAL 2 TIMES DAILY
Qty: 180 CAPSULE | Refills: 1 | Status: SHIPPED | OUTPATIENT
Start: 2025-01-31

## 2025-02-07 ENCOUNTER — HOSPITAL ENCOUNTER (OUTPATIENT)
Facility: HOSPITAL | Age: 65
Discharge: HOME | End: 2025-02-10
Payer: MEDICARE

## 2025-02-07 DIAGNOSIS — M25.562 PAIN IN BOTH KNEES, UNSPECIFIED CHRONICITY: ICD-10-CM

## 2025-02-07 DIAGNOSIS — M25.561 PAIN IN BOTH KNEES, UNSPECIFIED CHRONICITY: ICD-10-CM

## 2025-02-07 DIAGNOSIS — Z12.31 BREAST CANCER SCREENING BY MAMMOGRAM: ICD-10-CM

## 2025-02-07 PROCEDURE — 73565 X-RAY EXAM OF KNEES: CPT

## 2025-02-07 PROCEDURE — 77063 BREAST TOMOSYNTHESIS BI: CPT

## 2025-02-10 ENCOUNTER — TELEMEDICINE (OUTPATIENT)
Facility: CLINIC | Age: 65
End: 2025-02-10

## 2025-02-10 ENCOUNTER — TELEPHONE (OUTPATIENT)
Facility: CLINIC | Age: 65
End: 2025-02-10

## 2025-02-10 DIAGNOSIS — Z71.3 WEIGHT LOSS COUNSELING, ENCOUNTER FOR: ICD-10-CM

## 2025-02-10 DIAGNOSIS — E66.9 OBESITY (BMI 30-39.9): Primary | ICD-10-CM

## 2025-02-10 NOTE — PROGRESS NOTES
[facility]  [address], [place] [address]  Phone: [phone]      Jo Wall (: 1960) is a 64 y.o. female, established patient Hashimoto's hypothyroidism, hyperlipidemia, allergies, chronic fatigue syndrome, depression, eczema, plantar fascitis, insomnia, anxiety, depression, headaches, osteoarthritis, chronic back/neck pain , here for   discuss weight loss options and virtual visit     Patient conducted video/audio chat at home   on video /audio chat outside of home walking outside  Provider conducted audio chat in office (video chat not working, patient fine with proceeding)  Patient present today with  on video chat (bobby wall)  Patient present to discuss weight loss medication for obesity  Tried ozempic but says it was not covered  Would like it to go to compounding pharmacy        No results found for: \"LABA1C\"   Hemoglobin   Date Value Ref Range Status   2024 15.3 11.5 - 16.0 g/dL Final     Hematocrit   Date Value Ref Range Status   2024 44.3 35.0 - 47.0 % Final     Creatinine   Date Value Ref Range Status   2024 0.77 0.50 - 0.90 MG/DL Final     Glucose   Date Value Ref Range Status   2024 138 (H) 65 - 100 mg/dL Final     TSH   Date Value Ref Range Status   2022 3.060 0.450 - 4.500 uIU/mL Final     Potassium   Date Value Ref Range Status   2024 4.2 3.5 - 5.1 mmol/L Final     Cholesterol, Total   Date Value Ref Range Status   2022 216 (H) 100 - 199 mg/dL Final     HDL   Date Value Ref Range Status   2022 59 >39 mg/dL Final     Triglycerides   Date Value Ref Range Status   2022 106 0 - 149 mg/dL Final      Lab Results   Component Value Date     (H) 2022      Lab Results   Component Value Date    HEPCAB <0.1 10/26/2020      MRI Result (most recent):  MRI BRAIN WO CONTRAST 2022    Narrative  MRI BRAIN WITHOUT CONTRAST:    Clinical History: Intractable

## 2025-02-10 NOTE — PROGRESS NOTES
Called pt to start virtual visit. N/A. Left message asking pt to call back. Thank you    Chief Complaint   Patient presents with    discuss weight loss options     Pt unable to take vital signs at home.  \"Have you been to the ER, urgent care clinic since your last visit?  Hospitalized since your last visit?\"    NO    “Have you seen or consulted any other health care providers outside our system since your last visit?”    NO      “Have you had a diabetic eye exam?”    NO     No diabetic eye exam on file

## 2025-02-12 DIAGNOSIS — Z71.3 WEIGHT LOSS COUNSELING, ENCOUNTER FOR: Primary | ICD-10-CM

## 2025-02-18 RX ORDER — BENZONATATE 200 MG/1
200 CAPSULE ORAL 3 TIMES DAILY PRN
Qty: 90 CAPSULE | Refills: 0 | Status: SHIPPED | OUTPATIENT
Start: 2025-02-18

## 2025-02-18 RX ORDER — DICLOFENAC SODIUM 75 MG/1
75 TABLET, DELAYED RELEASE ORAL 2 TIMES DAILY
Qty: 180 TABLET | Refills: 0 | Status: SHIPPED | OUTPATIENT
Start: 2025-02-18

## 2025-02-24 DIAGNOSIS — Z76.89 ENCOUNTER TO ESTABLISH CARE WITH NEW DOCTOR: Primary | ICD-10-CM

## 2025-03-03 DIAGNOSIS — G89.29 OTHER CHRONIC PAIN: Primary | ICD-10-CM

## 2025-03-03 DIAGNOSIS — N32.81 OVERACTIVE BLADDER: ICD-10-CM

## 2025-03-03 DIAGNOSIS — M21.40: Primary | ICD-10-CM

## 2025-03-03 RX ORDER — RIZATRIPTAN BENZOATE 10 MG/1
10 TABLET, ORALLY DISINTEGRATING ORAL
Qty: 30 TABLET | Refills: 1 | Status: SHIPPED | OUTPATIENT
Start: 2025-03-03 | End: 2025-03-03

## 2025-03-03 RX ORDER — OXYBUTYNIN CHLORIDE 10 MG/1
TABLET, EXTENDED RELEASE ORAL
Qty: 90 TABLET | Refills: 1 | Status: SHIPPED | OUTPATIENT
Start: 2025-03-03

## 2025-03-04 DIAGNOSIS — I10 PRIMARY HYPERTENSION: ICD-10-CM

## 2025-03-04 DIAGNOSIS — F32.A DEPRESSION, UNSPECIFIED DEPRESSION TYPE: Primary | ICD-10-CM

## 2025-03-18 RX ORDER — BENZONATATE 200 MG/1
CAPSULE ORAL
Qty: 90 CAPSULE | Refills: 0 | Status: SHIPPED | OUTPATIENT
Start: 2025-03-18

## 2025-03-19 RX ORDER — LEVOTHYROXINE SODIUM 75 UG/1
75 TABLET ORAL DAILY
Qty: 90 TABLET | Refills: 0 | Status: CANCELLED | OUTPATIENT
Start: 2025-03-19

## 2025-03-20 DIAGNOSIS — E03.9 HYPOTHYROIDISM, UNSPECIFIED TYPE: Primary | ICD-10-CM

## 2025-03-20 RX ORDER — LEVOTHYROXINE SODIUM 75 UG/1
75 TABLET ORAL DAILY
Qty: 14 TABLET | Refills: 0 | Status: SHIPPED | OUTPATIENT
Start: 2025-03-20 | End: 2025-04-03

## 2025-04-11 RX ORDER — ATORVASTATIN CALCIUM 10 MG/1
10 TABLET, FILM COATED ORAL DAILY
Qty: 90 TABLET | Refills: 1 | OUTPATIENT
Start: 2025-04-11

## 2025-04-11 NOTE — TELEPHONE ENCOUNTER
My chart from  patient     Could I please get 90 day refills on     Atorvorstatin  Trazadone and  Quetiapine?    The Trazadone and Quetiapine is not on patient medication list to pin

## 2025-04-14 NOTE — TELEPHONE ENCOUNTER
LM- to call back    Per Dr Sanchez, patient needs to have labs done so Atorvastatin can be refilled.    Also, need to know who prescribed Trazadone and  Quetiapine? These medication are not in patient current medication list.

## 2025-04-15 ENCOUNTER — HOSPITAL ENCOUNTER (EMERGENCY)
Facility: HOSPITAL | Age: 65
Discharge: HOME | End: 2025-04-15
Attending: EMERGENCY MEDICINE
Payer: MEDICARE

## 2025-04-15 ENCOUNTER — APPOINTMENT (OUTPATIENT)
Facility: HOSPITAL | Age: 65
End: 2025-04-15
Payer: MEDICARE

## 2025-04-15 VITALS
WEIGHT: 235 LBS | HEIGHT: 65 IN | TEMPERATURE: 97.7 F | BODY MASS INDEX: 39.15 KG/M2 | OXYGEN SATURATION: 95 % | DIASTOLIC BLOOD PRESSURE: 89 MMHG | RESPIRATION RATE: 16 BRPM | HEART RATE: 63 BPM | SYSTOLIC BLOOD PRESSURE: 165 MMHG

## 2025-04-15 DIAGNOSIS — R10.2 PELVIC PAIN: ICD-10-CM

## 2025-04-15 DIAGNOSIS — K59.4 PROCTALGIA FUGAX: Primary | ICD-10-CM

## 2025-04-15 LAB
ALBUMIN SERPL-MCNC: 4.3 G/DL (ref 3.5–5.2)
ALBUMIN/GLOB SERPL: 1.4 (ref 1.1–2.2)
ALP SERPL-CCNC: 73 U/L (ref 35–104)
ALT SERPL-CCNC: 29 U/L (ref 10–35)
ANION GAP SERPL CALC-SCNC: 11 MMOL/L (ref 2–12)
APPEARANCE UR: CLEAR
AST SERPL-CCNC: 34 U/L (ref 10–35)
BACTERIA URNS QL MICRO: NEGATIVE /HPF
BASOPHILS # BLD: 0.06 K/UL (ref 0–0.1)
BASOPHILS NFR BLD: 0.9 % (ref 0–1)
BILIRUB SERPL-MCNC: 0.3 MG/DL (ref 0.2–1)
BILIRUB UR QL: NEGATIVE
BUN SERPL-MCNC: 17 MG/DL (ref 8–23)
BUN/CREAT SERPL: 29 (ref 12–20)
CALCIUM SERPL-MCNC: 9 MG/DL (ref 8.8–10.2)
CHLORIDE SERPL-SCNC: 107 MMOL/L (ref 98–107)
CO2 SERPL-SCNC: 21 MMOL/L (ref 22–29)
COLOR UR: NORMAL
CREAT SERPL-MCNC: 0.59 MG/DL (ref 0.5–0.9)
DIFFERENTIAL METHOD BLD: ABNORMAL
EOSINOPHIL # BLD: 0.23 K/UL (ref 0–0.4)
EOSINOPHIL NFR BLD: 3.4 % (ref 0–7)
EPITH CASTS URNS QL MICRO: NORMAL /LPF
ERYTHROCYTE [DISTWIDTH] IN BLOOD BY AUTOMATED COUNT: 13.8 % (ref 11.5–14.5)
GLOBULIN SER CALC-MCNC: 3 G/DL (ref 2–4)
GLUCOSE SERPL-MCNC: 96 MG/DL (ref 65–100)
GLUCOSE UR STRIP.AUTO-MCNC: NEGATIVE MG/DL
HCT VFR BLD AUTO: 41.7 % (ref 35–47)
HGB BLD-MCNC: 14.5 G/DL (ref 11.5–16)
HGB UR QL STRIP: NEGATIVE
IMM GRANULOCYTES # BLD AUTO: 0.01 K/UL (ref 0–0.04)
IMM GRANULOCYTES NFR BLD AUTO: 0.1 % (ref 0–0.5)
KETONES UR QL STRIP.AUTO: NEGATIVE MG/DL
LEUKOCYTE ESTERASE UR QL STRIP.AUTO: NEGATIVE
LIPASE SERPL-CCNC: 45 U/L (ref 13–60)
LYMPHOCYTES # BLD: 2.1 K/UL (ref 0.8–3.5)
LYMPHOCYTES NFR BLD: 31.4 % (ref 12–49)
MCH RBC QN AUTO: 30.7 PG (ref 26–34)
MCHC RBC AUTO-ENTMCNC: 34.8 G/DL (ref 30–36.5)
MCV RBC AUTO: 88.2 FL (ref 80–99)
MONOCYTES # BLD: 0.41 K/UL (ref 0–1)
MONOCYTES NFR BLD: 6.1 % (ref 5–13)
NEUTS SEG # BLD: 3.88 K/UL (ref 1.8–8)
NEUTS SEG NFR BLD: 58.1 % (ref 32–75)
NITRITE UR QL STRIP.AUTO: NEGATIVE
NRBC # BLD: 0 K/UL (ref 0–0.01)
NRBC BLD-RTO: 0 PER 100 WBC
PH UR STRIP: 5.5 (ref 5–8)
PLATELET # BLD AUTO: 247 K/UL (ref 150–400)
PMV BLD AUTO: 8.6 FL (ref 8.9–12.9)
POTASSIUM SERPL-SCNC: 4.2 MMOL/L (ref 3.5–5.1)
PROT SERPL-MCNC: 7.3 G/DL (ref 6.4–8.3)
PROT UR STRIP-MCNC: NEGATIVE MG/DL
RBC # BLD AUTO: 4.73 M/UL (ref 3.8–5.2)
RBC #/AREA URNS HPF: NORMAL /HPF
SODIUM SERPL-SCNC: 139 MMOL/L (ref 136–145)
SP GR UR REFRACTOMETRY: 1.02 (ref 1–1.03)
SPECIMEN HOLD: NORMAL
UROBILINOGEN UR QL STRIP.AUTO: 0.2 EU/DL (ref 0.2–1)
WBC # BLD AUTO: 6.7 K/UL (ref 3.6–11)
WBC URNS QL MICRO: NORMAL /HPF (ref 0–4)

## 2025-04-15 PROCEDURE — 6370000000 HC RX 637 (ALT 250 FOR IP): Performed by: STUDENT IN AN ORGANIZED HEALTH CARE EDUCATION/TRAINING PROGRAM

## 2025-04-15 PROCEDURE — 99285 EMERGENCY DEPT VISIT HI MDM: CPT

## 2025-04-15 PROCEDURE — 85025 COMPLETE CBC W/AUTO DIFF WBC: CPT

## 2025-04-15 PROCEDURE — 74177 CT ABD & PELVIS W/CONTRAST: CPT

## 2025-04-15 PROCEDURE — 36415 COLL VENOUS BLD VENIPUNCTURE: CPT

## 2025-04-15 PROCEDURE — 83690 ASSAY OF LIPASE: CPT

## 2025-04-15 PROCEDURE — 6360000002 HC RX W HCPCS: Performed by: STUDENT IN AN ORGANIZED HEALTH CARE EDUCATION/TRAINING PROGRAM

## 2025-04-15 PROCEDURE — 6360000004 HC RX CONTRAST MEDICATION: Performed by: STUDENT IN AN ORGANIZED HEALTH CARE EDUCATION/TRAINING PROGRAM

## 2025-04-15 PROCEDURE — 81001 URINALYSIS AUTO W/SCOPE: CPT

## 2025-04-15 PROCEDURE — 80053 COMPREHEN METABOLIC PANEL: CPT

## 2025-04-15 PROCEDURE — 96374 THER/PROPH/DIAG INJ IV PUSH: CPT

## 2025-04-15 RX ORDER — MORPHINE SULFATE 4 MG/ML
4 INJECTION, SOLUTION INTRAMUSCULAR; INTRAVENOUS
Refills: 0 | Status: COMPLETED | OUTPATIENT
Start: 2025-04-15 | End: 2025-04-15

## 2025-04-15 RX ORDER — MORPHINE SULFATE 4 MG/ML
4 INJECTION, SOLUTION INTRAMUSCULAR; INTRAVENOUS
Refills: 0 | Status: DISCONTINUED | OUTPATIENT
Start: 2025-04-15 | End: 2025-04-15

## 2025-04-15 RX ORDER — IOPAMIDOL 755 MG/ML
100 INJECTION, SOLUTION INTRAVASCULAR
Status: COMPLETED | OUTPATIENT
Start: 2025-04-15 | End: 2025-04-15

## 2025-04-15 RX ORDER — ACETAMINOPHEN 500 MG
1000 TABLET ORAL
Status: COMPLETED | OUTPATIENT
Start: 2025-04-15 | End: 2025-04-15

## 2025-04-15 RX ORDER — NITROGLYCERIN 4 MG/G
1 OINTMENT RECTAL EVERY 12 HOURS
Qty: 30 G | Refills: 0 | Status: SHIPPED | OUTPATIENT
Start: 2025-04-15

## 2025-04-15 RX ADMIN — MORPHINE SULFATE 4 MG: 4 INJECTION INTRAVENOUS at 17:35

## 2025-04-15 RX ADMIN — IOPAMIDOL 100 ML: 755 INJECTION, SOLUTION INTRAVENOUS at 15:55

## 2025-04-15 RX ADMIN — ACETAMINOPHEN 1000 MG: 500 TABLET ORAL at 14:32

## 2025-04-15 ASSESSMENT — ENCOUNTER SYMPTOMS
VOMITING: 0
DIARRHEA: 0
NAUSEA: 0
SHORTNESS OF BREATH: 0
ABDOMINAL PAIN: 0
RECTAL PAIN: 1
SORE THROAT: 0
COUGH: 0
EYE PAIN: 0

## 2025-04-15 ASSESSMENT — PAIN SCALES - GENERAL
PAINLEVEL_OUTOF10: 8
PAINLEVEL_OUTOF10: 8
PAINLEVEL_OUTOF10: 10

## 2025-04-15 ASSESSMENT — PAIN DESCRIPTION - LOCATION
LOCATION: PELVIS
LOCATION: ABDOMEN
LOCATION: RECTUM

## 2025-04-15 ASSESSMENT — PAIN DESCRIPTION - DESCRIPTORS
DESCRIPTORS: ACHING
DESCRIPTORS: SHARP;PRESSURE;ACHING

## 2025-04-15 ASSESSMENT — PAIN - FUNCTIONAL ASSESSMENT
PAIN_FUNCTIONAL_ASSESSMENT: 0-10
PAIN_FUNCTIONAL_ASSESSMENT: PREVENTS OR INTERFERES SOME ACTIVE ACTIVITIES AND ADLS

## 2025-04-15 ASSESSMENT — LIFESTYLE VARIABLES
HOW OFTEN DO YOU HAVE A DRINK CONTAINING ALCOHOL: NEVER
HOW MANY STANDARD DRINKS CONTAINING ALCOHOL DO YOU HAVE ON A TYPICAL DAY: PATIENT DOES NOT DRINK

## 2025-04-15 NOTE — ED PROVIDER NOTES
[facility] EMERGENCY DEPARTMENT  EMERGENCY DEPARTMENT ENCOUNTER      Pt Name: Jo Wall  MRN: [id number]  Birthdate 1960  Date of evaluation: 4/15/2025  Provider: TEMO HERNANDEZ    CHIEF COMPLAINT       Chief Complaint   Patient presents with    Rectal Pain         HISTORY OF PRESENT ILLNESS   (Location/Symptom, Timing/Onset, Context/Setting, Quality, Duration, Modifying Factors, Severity)  Note limiting factors.   64-year-old female presents ED with pelvic and rectal pain.  Reports that about 2 weeks ago she had a very large bowel movement that she had to strain extremely hard for.  She reports since then she has had pelvic pain\" especially with bowel movements and urinating.  She notes lower abdominal pain and rectal pain.  She has tried various over-the-counter medications such as Tylenol and she notes that she is already constipated at baseline and takes ClearLax every day.  She reports that she also found a hydrocodone in her medicine cabinet that she took with little relief.  Denies any blood in stool, nausea, vomiting, diarrhea, fevers or chills.            Review of External Medical Records:     Nursing Notes were reviewed.    REVIEW OF SYSTEMS    (2-9 systems for level 4, 10 or more for level 5)     Review of Systems   Constitutional:  Negative for chills and fever.   HENT:  Negative for congestion, ear pain and sore throat.    Eyes:  Negative for pain.   Respiratory:  Negative for cough and shortness of breath.    Cardiovascular:  Negative for chest pain.   Gastrointestinal:  Positive for rectal pain. Negative for abdominal pain, diarrhea, nausea and vomiting.   Genitourinary:  Positive for pelvic pain. Negative for dysuria and flank pain.   Musculoskeletal:  Negative for myalgias.   Skin:  Negative for rash.   Neurological:  Negative for dizziness and headaches.   Hematological:  Negative for adenopathy.       Except as noted above the remainder of the review of systems was reviewed and

## 2025-04-15 NOTE — DISCHARGE INSTRUCTIONS
Thank you for allowing us to provide you with medical care today.  We realize that you have many choices for your emergency care needs.  We thank you for choosing [facility].  Please choose us in the future for any continued health care needs.     The exam and treatment you received in the Emergency Department were for an emergent problem and are not intended as complete care. It is important that you follow up with a doctor, nurse practitioner, or physician assistant for ongoing care. If your symptoms worsen or you do not improve as expected and you are unable to reach your usual health care provider, you should return to the Emergency Department. We are available 24 hours a day.     Please make an appointment with your healthcare provider(s) for follow up of your Emergency Department visit.  Take this sheet with you when you go to your follow-up visit.    Continue to monitor symptoms at home.  Take new medication as prescribed.  Follow-up with PCP and return with any changes or worsening.

## 2025-04-15 NOTE — ED TRIAGE NOTES
Pt to ED for rectal pain x 2 weeks. Reports pain to \"pelvic girdle\".      Reports nausea that attributes to pain.

## 2025-04-15 NOTE — ED NOTES
Patient requesting pain medication at this time. RN explaining that we are still waiting on CT read from radiology. TEMO Zhour notified of request for pain medication. Patient unwilling to sit in bed at this time, instead sitting on chair leaning forward onto bed railings. TEMO Bernal assessed in person at this time.

## 2025-04-17 DIAGNOSIS — J45.909 MILD ASTHMA, UNSPECIFIED WHETHER COMPLICATED, UNSPECIFIED WHETHER PERSISTENT: Primary | ICD-10-CM

## 2025-04-17 RX ORDER — MONTELUKAST SODIUM 10 MG/1
10 TABLET ORAL DAILY
Qty: 90 TABLET | Refills: 0 | Status: SHIPPED | OUTPATIENT
Start: 2025-04-17

## 2025-04-18 NOTE — TELEPHONE ENCOUNTER
PCP: Elsa Sanchez MD    Last appt: [unfilled]  Future Appointments   Date Time Provider Department Center   4/24/2025 11:30 AM Elsa Sanchez MD [facility]   4/24/2025  1:00 PM Naomi Melchor, APRN - NP [facility]       Requested Prescriptions     Pending Prescriptions Disp Refills    benzonatate (TESSALON) 200 MG capsule [Pharmacy Med Name: BENZONATATE 200 MG CAPSULE] 90 capsule 0     Sig: TAKE ONE CAPSULE BY MOUTH THREE TIMES A DAY AS NEEDED FOR COUGH       Prior labs and Blood pressures:  BP Readings from Last 3 Encounters:   04/15/25 (!) 165/89   01/15/25 (!) 168/94   11/20/24 122/78     Lab Results   Component Value Date/Time     04/15/2025 02:41 PM    K 4.2 04/15/2025 02:41 PM     04/15/2025 02:41 PM    CO2 21 04/15/2025 02:41 PM    BUN 17 04/15/2025 02:41 PM    GFRAA 70 12/30/2021 11:34 AM     No results found for: \"HBA1C\", \"MBZ1OPQV\"  Lab Results   Component Value Date/Time    CHOL 216 06/30/2022 10:47 AM    HDL 59 06/30/2022 10:47 AM     06/30/2022 10:47 AM    VLDL 19 06/30/2022 10:47 AM     No results found for: \"VITD3\"    Lab Results   Component Value Date/Time    TSH 3.060 06/30/2022 10:47 AM

## 2025-04-21 RX ORDER — BENZONATATE 200 MG/1
CAPSULE ORAL
Qty: 90 CAPSULE | Refills: 0 | Status: SHIPPED | OUTPATIENT
Start: 2025-04-21

## 2025-04-23 DIAGNOSIS — E03.9 HYPOTHYROIDISM, UNSPECIFIED TYPE: ICD-10-CM

## 2025-04-24 ENCOUNTER — OFFICE VISIT (OUTPATIENT)
Age: 65
End: 2025-04-24
Payer: MEDICARE

## 2025-04-24 VITALS
HEIGHT: 65 IN | WEIGHT: 242 LBS | HEART RATE: 86 BPM | BODY MASS INDEX: 40.32 KG/M2 | SYSTOLIC BLOOD PRESSURE: 162 MMHG | OXYGEN SATURATION: 95 % | DIASTOLIC BLOOD PRESSURE: 102 MMHG

## 2025-04-24 DIAGNOSIS — N95.2 VAGINAL ATROPHY: ICD-10-CM

## 2025-04-24 DIAGNOSIS — Z01.419 WELL WOMAN EXAM WITH ROUTINE GYNECOLOGICAL EXAM: Primary | ICD-10-CM

## 2025-04-24 PROCEDURE — 99396 PREV VISIT EST AGE 40-64: CPT | Performed by: NURSE PRACTITIONER

## 2025-04-24 PROCEDURE — 3077F SYST BP >= 140 MM HG: CPT | Performed by: NURSE PRACTITIONER

## 2025-04-24 PROCEDURE — 3080F DIAST BP >= 90 MM HG: CPT | Performed by: NURSE PRACTITIONER

## 2025-04-24 RX ORDER — ESTRADIOL 0.1 MG/G
1 CREAM VAGINAL
Qty: 42.5 G | Refills: 3 | Status: SHIPPED | OUTPATIENT
Start: 2025-04-24

## 2025-04-24 NOTE — PROGRESS NOTES
Jo Wall is a ,  64 y.o. female   No LMP recorded. Patient is postmenopausal.    She presents for her annual checkup.     She is having problems - vaginal irritation, intermittent itching and dryness several years, chronic fatigued.    Menstrual status:  Postmenopausal     Contraception:    The current method of family planning is none.    Hormonal status:  She reports no perimenstrual type symptoms.   She is not having vasomotor symptoms.  The patient is not using any ERT.    Sexual history:    She  reports that she is not currently sexually active and has had partner(s) who are male. She reports using the following method of birth control/protection: None.    Medical conditions:    Since her last annual GYN exam about  10 years  ago, she has not the following changes in her health history: see medical condiitons     Surgical history confirmed with patient.  has a past surgical history that includes Colonoscopy; gyn (); pr unlisted procedure abdomen peritoneum & omentum; gi; Colonoscopy (N/A, 2024); Upper gastrointestinal endoscopy (N/A, 2024); and myomectomy ().    Pap and Mammogram History:    Her last Pap smear was normal, obtained 10 year(s) ago.    The patient had a recent mammogram 2025 which was negative for malignancy.    Narrative & Impression  STUDY: Bilateral digital screening mammogram with 3-D tomosynthesis     INDICATION:  Screening.     COMPARISON: Single prior available study 2021     BREAST COMPOSITION: There are scattered areas of fibroglandular density.     FINDINGS: Bilateral digital screening mammography was performed and is  interpreted in conjunction with a computer assisted detection (CAD) system.  Additionally, tomosynthesis of both breasts in the CC and MLO projections was  performed.  No suspicious masses or calcifications are identified.  There has  been no significant change.      IMPRESSION:  BI-RADS 1: Negative. No mammographic evidence of

## 2025-04-28 ENCOUNTER — TELEPHONE (OUTPATIENT)
Facility: CLINIC | Age: 65
End: 2025-04-28

## 2025-04-28 DIAGNOSIS — Z71.3 WEIGHT LOSS COUNSELING, ENCOUNTER FOR: ICD-10-CM

## 2025-04-28 RX ORDER — LEVOTHYROXINE SODIUM 75 UG/1
75 TABLET ORAL DAILY
Qty: 14 TABLET | Refills: 0 | OUTPATIENT
Start: 2025-04-28

## 2025-04-28 NOTE — TELEPHONE ENCOUNTER
Called pt regarding rx refill for [facility] Tirzepatide with Levocarnitine.    Dr. Sanchez wants to consult another physician regarding this combination of medication before she agrees to send it to [facility]

## 2025-04-28 NOTE — TELEPHONE ENCOUNTER
Patient called and read her Dr. Sanchez notes and patient will wait to here back from office if medication will be sent to [facility].

## 2025-05-02 LAB
., LABCORP: NORMAL
C TRACH RRNA CVX QL NAA+PROBE: NEGATIVE
CYTOLOGIST CVX/VAG CYTO: NORMAL
CYTOLOGY CVX/VAG DOC CYTO: NORMAL
CYTOLOGY CVX/VAG DOC THIN PREP: NORMAL
DX ICD CODE: NORMAL
Lab: NORMAL
N GONORRHOEA RRNA CVX QL NAA+PROBE: NEGATIVE
OTHER STN SPEC: NORMAL
SERVICE CMNT-IMP: NORMAL
STAT OF ADQ CVX/VAG CYTO-IMP: NORMAL
T VAGINALIS RRNA SPEC QL NAA+PROBE: NEGATIVE

## 2025-05-05 ENCOUNTER — RESULTS FOLLOW-UP (OUTPATIENT)
Age: 65
End: 2025-05-05

## 2025-05-12 DIAGNOSIS — Z76.89 ENCOUNTER TO ESTABLISH CARE: Primary | ICD-10-CM

## 2025-05-12 DIAGNOSIS — E55.9 VITAMIN D DEFICIENCY: ICD-10-CM

## 2025-05-12 DIAGNOSIS — Z76.89 ENCOUNTER TO ESTABLISH CARE: ICD-10-CM

## 2025-05-12 DIAGNOSIS — R79.89 LOW SERUM THYROID STIMULATING HORMONE (TSH): ICD-10-CM

## 2025-05-12 DIAGNOSIS — Z13.1 ENCOUNTER FOR SCREENING FOR DIABETES MELLITUS: ICD-10-CM

## 2025-05-12 DIAGNOSIS — E66.813 CLASS 3 SEVERE OBESITY WITH SERIOUS COMORBIDITY AND BODY MASS INDEX (BMI) OF 40.0 TO 44.9 IN ADULT, UNSPECIFIED OBESITY TYPE (HCC): ICD-10-CM

## 2025-05-12 DIAGNOSIS — E78.5 HYPERLIPIDEMIA, UNSPECIFIED HYPERLIPIDEMIA TYPE: ICD-10-CM

## 2025-05-12 DIAGNOSIS — E06.3 HASHIMOTO'S THYROIDITIS: ICD-10-CM

## 2025-05-19 DIAGNOSIS — E03.9 HYPOTHYROIDISM, UNSPECIFIED TYPE: ICD-10-CM

## 2025-05-20 DIAGNOSIS — E06.3 HASHIMOTO'S THYROIDITIS: Primary | ICD-10-CM

## 2025-05-20 DIAGNOSIS — G93.32 CFS (CHRONIC FATIGUE SYNDROME): Primary | ICD-10-CM

## 2025-05-20 RX ORDER — DICLOFENAC SODIUM 75 MG/1
75 TABLET, DELAYED RELEASE ORAL 2 TIMES DAILY
Qty: 180 TABLET | Refills: 0 | OUTPATIENT
Start: 2025-05-20

## 2025-05-20 RX ORDER — LEVOTHYROXINE SODIUM 75 UG/1
75 TABLET ORAL DAILY
Qty: 14 TABLET | Refills: 0 | OUTPATIENT
Start: 2025-05-20

## 2025-05-22 ENCOUNTER — TELEPHONE (OUTPATIENT)
Facility: CLINIC | Age: 65
End: 2025-05-22

## 2025-05-22 NOTE — TELEPHONE ENCOUNTER
[facility] Primary Care at Home ([facility])   Phone:  [phone]      Fax:  [phone] [address], [address]  [place] [address]    Name:  Jo Wall  YOB: 1960      Received Primary Care at Home referral for Jo Wall from Elsa Sanchez.  Called patient, received a recorded message stating \"this line is protected, please enter a verification code.  Your verification code is zero.\"  Entered 0 on the key pad, phone did not ring.      Per chart, patient lives in [place] which is an area that is outside of the service area for Primary Care at Home.    Radha Rod RN, Gerontological Nursing-BC, PN

## 2025-05-27 RX ORDER — DICLOFENAC SODIUM 75 MG/1
75 TABLET, DELAYED RELEASE ORAL 2 TIMES DAILY
Qty: 180 TABLET | Refills: 0 | OUTPATIENT
Start: 2025-05-27

## 2025-05-28 ENCOUNTER — TELEPHONE (OUTPATIENT)
Facility: CLINIC | Age: 65
End: 2025-05-28

## 2025-05-28 NOTE — TELEPHONE ENCOUNTER
Received call 's office they do not come to [facility]. She suggested [facility] P:  [phone]

## 2025-05-28 NOTE — TELEPHONE ENCOUNTER
[facility] Primary Care at Home ([facility])   Phone:  [phone]      Fax:  [phone] [address], [address]  [place] [address]    Name:  Jo Wall  YOB: 1960    Received faxed Primary Care at Home referral for Jo Wall from Clara Pacheco MA at Dr. Elsa Sanchez's office ([facility]).       See this nurse's note from 5/22/25.  The referral came through in the Primary Care at Home referral workqueue.  This nurse sent in-basket message to Dr. Sanchez on 5/22/25 regarding the referral:   \"Thank you for the referral.  I tried unsuccessfully to reach patient.  I will try again later.  However according to her chart, she lives in [place] which is outside of our service area for primary care home visits.\"  Notified her that [facility] may go to that area.  Their number is [phone].  They are not part of [facility] so they won't see a referral in [facility].     This nurse called Dr. Sanchez's office today and spoke with Gail, left message for Clara Pacheco who faxed the referral today notifying her that Primary Care at Home does not go to the [place] area and giving her name and phone number for [facility].    This nurse called patient, no answer, left message requesting call back.      Radha Rod, RN, Gerontological Nursing-BC, PN

## 2025-05-28 NOTE — TELEPHONE ENCOUNTER
Spoke with the patient and she is calling the primary at home and support services and gennysusie said she left a message for  office to see if she could go there.  Also let the patient know to call her insurance company to see what would covered and to call the office back to let us know

## 2025-05-29 DIAGNOSIS — G93.32 CFS (CHRONIC FATIGUE SYNDROME): Primary | ICD-10-CM

## 2025-05-29 NOTE — TELEPHONE ENCOUNTER
You would to placed the referral and then one of us can fax the referral and last office notes to [facility]

## 2025-06-02 ENCOUNTER — TELEPHONE (OUTPATIENT)
Facility: CLINIC | Age: 65
End: 2025-06-02

## 2025-06-04 NOTE — TELEPHONE ENCOUNTER
[facility] Primary Care at Home ([facility])   Phone:  [phone]      Fax:  [phone] [address], [address]  [place] [address]    Name:  Jo Wall  YOB: 1960    Patient returned call regarding Primary Care at Home.      This nurse confirmed that patient lives in [place], [place] [address].   Patient's address is not in one of the zip codes that Primary Care at Home services.  This nurse gave patient the phone number for [facility], a primary care at home practice that may go to that area, [phone].       Radha Rod RN, Gerontological Nursing-BC, PN

## 2025-06-05 ENCOUNTER — TELEPHONE (OUTPATIENT)
Facility: CLINIC | Age: 65
End: 2025-06-05

## 2025-06-06 ENCOUNTER — TELEPHONE (OUTPATIENT)
Facility: CLINIC | Age: 65
End: 2025-06-06

## 2025-06-06 NOTE — TELEPHONE ENCOUNTER
PT has been set with a nurse that will come to her home on Monday 06/09/2025.Francisca from [facility] helped out with this and is meeting with me on Wednesday to give me and update

## 2025-06-09 NOTE — TELEPHONE ENCOUNTER
CRISTÓBAL--Jane RN Clinical Manager from [facility] is calling to inform that the nurse went out today to see the patient and she does not have a skilled need for nursing and is refusing therapy, so they are going to nonadmit her and she will send the referral to [facility] Jyoti Espinosa NP--if any questions Jane can be reached at [phone].

## 2025-06-19 ENCOUNTER — TELEPHONE (OUTPATIENT)
Facility: CLINIC | Age: 65
End: 2025-06-19

## 2025-06-19 NOTE — TELEPHONE ENCOUNTER
Patient called and request last office note to be sent to Jyoti Espinosa NP at [phone].   Office note faxed.

## 2025-07-01 NOTE — TELEPHONE ENCOUNTER
I did not see any labs in [facility]            Elsa Sanchez MD Underwood, Amanda; [facility] Clinical Pool  Caller: Unspecified (3 weeks ago)  Did her labs get completed yet? I thought it was completed at home

## 2025-07-05 DIAGNOSIS — F41.9 ANXIETY: ICD-10-CM

## 2025-07-08 ENCOUNTER — TELEPHONE (OUTPATIENT)
Age: 65
End: 2025-07-08

## 2025-07-08 RX ORDER — HYDROXYZINE HYDROCHLORIDE 25 MG/1
TABLET, FILM COATED ORAL
Qty: 120 TABLET | Refills: 1 | Status: SHIPPED | OUTPATIENT
Start: 2025-07-08

## 2025-07-08 RX ORDER — DICLOFENAC SODIUM 75 MG/1
75 TABLET, DELAYED RELEASE ORAL 2 TIMES DAILY
Qty: 60 TABLET | Refills: 1 | Status: SHIPPED | OUTPATIENT
Start: 2025-07-08

## 2025-07-08 NOTE — TELEPHONE ENCOUNTER
Called and requested to know what knee is the issue? She said both actually so I let her know we will be doing Xrays of both knees

## 2025-07-11 ENCOUNTER — OFFICE VISIT (OUTPATIENT)
Age: 65
End: 2025-07-11
Payer: MEDICARE

## 2025-07-11 VITALS
HEIGHT: 65 IN | DIASTOLIC BLOOD PRESSURE: 78 MMHG | SYSTOLIC BLOOD PRESSURE: 128 MMHG | OXYGEN SATURATION: 97 % | WEIGHT: 242 LBS | BODY MASS INDEX: 40.32 KG/M2 | HEART RATE: 82 BPM

## 2025-07-11 DIAGNOSIS — M25.561 PAIN IN BOTH KNEES, UNSPECIFIED CHRONICITY: ICD-10-CM

## 2025-07-11 DIAGNOSIS — M17.11 PRIMARY OSTEOARTHRITIS OF RIGHT KNEE: Primary | ICD-10-CM

## 2025-07-11 DIAGNOSIS — M21.162 GENU VARUM, ACQUIRED, LEFT: ICD-10-CM

## 2025-07-11 DIAGNOSIS — M21.161 GENU VARUM, ACQUIRED, RIGHT: ICD-10-CM

## 2025-07-11 DIAGNOSIS — M17.12: ICD-10-CM

## 2025-07-11 DIAGNOSIS — M17.12 PRIMARY OSTEOARTHRITIS OF LEFT KNEE: ICD-10-CM

## 2025-07-11 DIAGNOSIS — M25.562 PAIN IN BOTH KNEES, UNSPECIFIED CHRONICITY: ICD-10-CM

## 2025-07-11 DIAGNOSIS — M17.11: ICD-10-CM

## 2025-07-11 PROCEDURE — G8427 DOCREV CUR MEDS BY ELIG CLIN: HCPCS | Performed by: ORTHOPAEDIC SURGERY

## 2025-07-11 PROCEDURE — 1036F TOBACCO NON-USER: CPT | Performed by: ORTHOPAEDIC SURGERY

## 2025-07-11 PROCEDURE — 3017F COLORECTAL CA SCREEN DOC REV: CPT | Performed by: ORTHOPAEDIC SURGERY

## 2025-07-11 PROCEDURE — 99204 OFFICE O/P NEW MOD 45 MIN: CPT | Performed by: ORTHOPAEDIC SURGERY

## 2025-07-11 PROCEDURE — G8417 CALC BMI ABV UP PARAM F/U: HCPCS | Performed by: ORTHOPAEDIC SURGERY

## 2025-07-11 PROCEDURE — 3074F SYST BP LT 130 MM HG: CPT | Performed by: ORTHOPAEDIC SURGERY

## 2025-07-11 PROCEDURE — 3078F DIAST BP <80 MM HG: CPT | Performed by: ORTHOPAEDIC SURGERY

## 2025-07-11 RX ORDER — QUETIAPINE FUMARATE 25 MG/1
TABLET, FILM COATED ORAL
COMMUNITY
Start: 2025-07-05

## 2025-07-11 RX ORDER — TRAMADOL HYDROCHLORIDE 50 MG/1
TABLET ORAL
COMMUNITY
Start: 2025-06-26

## 2025-07-11 RX ORDER — ALBUTEROL SULFATE 90 UG/1
INHALANT RESPIRATORY (INHALATION)
COMMUNITY
Start: 2025-07-05

## 2025-07-11 RX ORDER — CLONIDINE HYDROCHLORIDE 0.1 MG/1
TABLET ORAL
COMMUNITY
Start: 2025-07-06

## 2025-07-11 RX ORDER — DOXYCYCLINE 100 MG/1
CAPSULE ORAL
COMMUNITY
Start: 2025-07-05

## 2025-07-11 RX ORDER — QUETIAPINE FUMARATE 50 MG/1
TABLET, FILM COATED ORAL
COMMUNITY
Start: 2025-04-11

## 2025-07-11 RX ORDER — TRAZODONE HYDROCHLORIDE 100 MG/1
TABLET ORAL
COMMUNITY

## 2025-07-11 RX ORDER — PANTOPRAZOLE SODIUM 40 MG/1
TABLET, DELAYED RELEASE ORAL
COMMUNITY
Start: 2025-06-20

## 2025-07-11 RX ORDER — TRIAMCINOLONE ACETONIDE 40 MG/ML
INJECTION, SUSPENSION INTRA-ARTICULAR; INTRAMUSCULAR
COMMUNITY
Start: 2025-06-24

## 2025-07-11 ASSESSMENT — PATIENT HEALTH QUESTIONNAIRE - PHQ9
8. MOVING OR SPEAKING SO SLOWLY THAT OTHER PEOPLE COULD HAVE NOTICED. OR THE OPPOSITE, BEING SO FIGETY OR RESTLESS THAT YOU HAVE BEEN MOVING AROUND A LOT MORE THAN USUAL: NOT AT ALL
4. FEELING TIRED OR HAVING LITTLE ENERGY: NOT AT ALL
9. THOUGHTS THAT YOU WOULD BE BETTER OFF DEAD, OR OF HURTING YOURSELF: NOT AT ALL
1. LITTLE INTEREST OR PLEASURE IN DOING THINGS: NOT AT ALL
SUM OF ALL RESPONSES TO PHQ QUESTIONS 1-9: 0
5. POOR APPETITE OR OVEREATING: NOT AT ALL
6. FEELING BAD ABOUT YOURSELF - OR THAT YOU ARE A FAILURE OR HAVE LET YOURSELF OR YOUR FAMILY DOWN: NOT AT ALL
7. TROUBLE CONCENTRATING ON THINGS, SUCH AS READING THE NEWSPAPER OR WATCHING TELEVISION: NOT AT ALL
3. TROUBLE FALLING OR STAYING ASLEEP: NOT AT ALL
2. FEELING DOWN, DEPRESSED OR HOPELESS: NOT AT ALL
10. IF YOU CHECKED OFF ANY PROBLEMS, HOW DIFFICULT HAVE THESE PROBLEMS MADE IT FOR YOU TO DO YOUR WORK, TAKE CARE OF THINGS AT HOME, OR GET ALONG WITH OTHER PEOPLE: NOT DIFFICULT AT ALL
SUM OF ALL RESPONSES TO PHQ QUESTIONS 1-9: 0

## 2025-07-11 NOTE — PROGRESS NOTES
Subjective:      Patient ID: Jo Wall is a 64 y.o.  female.    Chief Complaint   Patient presents with    New Patient     NP cee knee pain - she would like to talk about a knee replacement in both knees , she just got the inj's Friday by her normal pcp      The patient is a pleasant 64-year-old former  for disabled individuals, with a history of chronic fatigue syndrome, fibromyalgia, and hypothyroidism who presents today for evaluation of chronic bilateral knee pain that has been ongoing for many years.  She has been diagnosed with osteoarthritis in both knees, and x-rays today confirm moderately severe degenerative arthritis in all 3 compartments of both knees especially medially. The patient has been treated mostly by primary care, and has never seen an orthopedic surgeon.  She has had at least 3 or 4 series of steroid injections into both knees, most recently 1 week ago by her primary provider.  Steroid injections have always helped for about 3 months at a time.  It was recommended that she come here for a formal evaluation for possible knee replacements.  She is on diclofenac orally as well as topically which helps.  She purchased some knee braces recently which also helped.  She does not believe that they fit right and would like another set.  Patient states that she has been highly physically active for many years including strength training, yoga, distance running, and a lot of other sports which have hurt her knees over the years.  Right knee has been much worse than the left.  Patient is on Lyrica for the fibromyalgia.    Social History     Occupational History    Not on file   Tobacco Use    Smoking status: Never    Smokeless tobacco: Never   Vaping Use    Vaping status: Never Used   Substance and Sexual Activity    Alcohol use: Yes     Alcohol/week: 1.0 standard drink of alcohol     Types: 1 Glasses of wine per week     Comment: occasional    Drug use: Not Currently

## 2025-07-11 NOTE — PROGRESS NOTES
Identified pt with two pt identifiers (name and ). Reviewed chart in preparation for visit and have obtained necessary documentation.     Jo Wall is a 64 y.o. female New Patient (NP cee knee pain - she would like to talk about a knee replacement in both knees , she just got the inj's Friday by her normal pcp )  .     Vitals:    25 1118   BP: 128/78   BP Site: Left Upper Arm   Patient Position: Sitting   BP Cuff Size: Large Adult   Pulse: 82   SpO2: 97%   Weight: 109.8 kg (242 lb)   Height: 1.651 m (5' 5\")           1. Have you been to the ER, urgent care clinic since your last visit?  Hospitalized since your last visit?  no    2. Have you seen or consulted any other health care providers outside of the [facility] since your last visit?  Include any pap smears or colon screening.  no

## 2025-08-06 ENCOUNTER — TELEPHONE (OUTPATIENT)
Age: 65
End: 2025-08-06

## 2025-08-11 ENCOUNTER — TELEPHONE (OUTPATIENT)
Age: 65
End: 2025-08-11

## 2025-08-12 ENCOUNTER — TELEPHONE (OUTPATIENT)
Age: 65
End: 2025-08-12

## 2025-08-26 ENCOUNTER — OFFICE VISIT (OUTPATIENT)
Age: 65
End: 2025-08-26

## 2025-08-26 DIAGNOSIS — M17.12: ICD-10-CM

## 2025-08-26 DIAGNOSIS — M25.562 PAIN IN BOTH KNEES, UNSPECIFIED CHRONICITY: ICD-10-CM

## 2025-08-26 DIAGNOSIS — M17.11: ICD-10-CM

## 2025-08-26 DIAGNOSIS — M25.561 PAIN IN BOTH KNEES, UNSPECIFIED CHRONICITY: ICD-10-CM

## 2025-08-26 DIAGNOSIS — M17.11 PRIMARY OSTEOARTHRITIS OF RIGHT KNEE: Primary | ICD-10-CM

## 2025-08-26 DIAGNOSIS — M17.12 PRIMARY OSTEOARTHRITIS OF LEFT KNEE: ICD-10-CM

## 2025-08-26 ASSESSMENT — PATIENT HEALTH QUESTIONNAIRE - PHQ9
2. FEELING DOWN, DEPRESSED OR HOPELESS: NOT AT ALL
SUM OF ALL RESPONSES TO PHQ QUESTIONS 1-9: 0
SUM OF ALL RESPONSES TO PHQ QUESTIONS 1-9: 0
1. LITTLE INTEREST OR PLEASURE IN DOING THINGS: NOT AT ALL
SUM OF ALL RESPONSES TO PHQ QUESTIONS 1-9: 0
SUM OF ALL RESPONSES TO PHQ QUESTIONS 1-9: 0

## 2025-08-28 DIAGNOSIS — N32.81 OVERACTIVE BLADDER: ICD-10-CM

## 2025-08-29 RX ORDER — OXYBUTYNIN CHLORIDE 10 MG/1
TABLET, EXTENDED RELEASE ORAL
Qty: 90 TABLET | Refills: 0 | Status: SHIPPED | OUTPATIENT
Start: 2025-08-29

## 2025-09-02 ENCOUNTER — OFFICE VISIT (OUTPATIENT)
Age: 65
End: 2025-09-02

## 2025-09-02 DIAGNOSIS — M17.11: ICD-10-CM

## 2025-09-02 DIAGNOSIS — M21.162 GENU VARUM, ACQUIRED, LEFT: ICD-10-CM

## 2025-09-02 DIAGNOSIS — M25.562 PAIN IN BOTH KNEES, UNSPECIFIED CHRONICITY: ICD-10-CM

## 2025-09-02 DIAGNOSIS — M25.561 PAIN IN BOTH KNEES, UNSPECIFIED CHRONICITY: ICD-10-CM

## 2025-09-02 DIAGNOSIS — M17.11 PRIMARY OSTEOARTHRITIS OF RIGHT KNEE: Primary | ICD-10-CM

## 2025-09-02 DIAGNOSIS — M17.12: ICD-10-CM

## 2025-09-02 DIAGNOSIS — M17.12 PRIMARY OSTEOARTHRITIS OF LEFT KNEE: ICD-10-CM

## 2025-09-02 DIAGNOSIS — M21.161 GENU VARUM, ACQUIRED, RIGHT: ICD-10-CM

## 2025-09-02 PROCEDURE — 99024 POSTOP FOLLOW-UP VISIT: CPT | Performed by: ORTHOPAEDIC SURGERY

## 2025-09-02 ASSESSMENT — PATIENT HEALTH QUESTIONNAIRE - PHQ9
SUM OF ALL RESPONSES TO PHQ QUESTIONS 1-9: 0
2. FEELING DOWN, DEPRESSED OR HOPELESS: NOT AT ALL
1. LITTLE INTEREST OR PLEASURE IN DOING THINGS: NOT AT ALL
SUM OF ALL RESPONSES TO PHQ QUESTIONS 1-9: 0

## (undated) DEVICE — FIRM 4CM: Brand: NASOPORE

## (undated) DEVICE — Device

## (undated) DEVICE — MINOR ENT-SFMCASU: Brand: MEDLINE INDUSTRIES, INC.

## (undated) DEVICE — CODMAN® SURGICAL PATTIES 1/2" X 3" (1.27CM X 7.62CM): Brand: CODMAN®

## (undated) DEVICE — ORISE PROKNIFE 1.5 MM ELECTRODE: Brand: ORISE™ PROKNIFE

## (undated) DEVICE — GLOVE ORANGE PI 7 1/2   MSG9075

## (undated) DEVICE — SOL INJ SOD CL 0.9% 500ML BG --

## (undated) DEVICE — FORCEPS BX L240CM JAW DIA2.4MM ORNG L CAP W/ NDL DISP RAD

## (undated) DEVICE — SOLUTION IRRIG 1000ML 0.9% SOD CHL USP POUR PLAS BTL

## (undated) DEVICE — BLADE 1882040 5PK INFERIOR TURB 2MM

## (undated) DEVICE — SUTURE CHROMIC GUT SZ 4-0 L18IN ABSRB BRN L13MM P-3 3/8 CIR 1654G

## (undated) DEVICE — ORISE PROKNIFE 3.0 MM ELECTRODE: Brand: ORISE™ PROKNIFE

## (undated) DEVICE — SUPPLEMENT DIGESTIVE H2O SOL GI-EASE